# Patient Record
Sex: MALE | Race: WHITE | NOT HISPANIC OR LATINO | Employment: FULL TIME | URBAN - METROPOLITAN AREA
[De-identification: names, ages, dates, MRNs, and addresses within clinical notes are randomized per-mention and may not be internally consistent; named-entity substitution may affect disease eponyms.]

---

## 2014-02-05 LAB — CRC RECOMMENDATION EXT: NORMAL

## 2019-02-18 ENCOUNTER — HISTORICAL (OUTPATIENT)
Dept: ADMINISTRATIVE | Facility: HOSPITAL | Age: 64
End: 2019-02-18

## 2019-02-18 LAB
ABS NEUT (OLG): 3.1 X10(3)/MCL (ref 2.1–9.2)
ALBUMIN SERPL-MCNC: 4.5 GM/DL (ref 3.4–5)
ALBUMIN/GLOB SERPL: 1.88 {RATIO} (ref 1.5–2.5)
ALP SERPL-CCNC: 63 UNIT/L (ref 38–126)
ALT SERPL-CCNC: 29 UNIT/L (ref 7–52)
APPEARANCE, UA: CLEAR
AST SERPL-CCNC: 23 UNIT/L (ref 15–37)
BACTERIA #/AREA URNS AUTO: NORMAL /HPF
BILIRUB SERPL-MCNC: 0.5 MG/DL (ref 0.2–1)
BILIRUB UR QL STRIP: NEGATIVE MG/DL
BILIRUBIN DIRECT+TOT PNL SERPL-MCNC: 0.1 MG/DL (ref 0–0.5)
BILIRUBIN DIRECT+TOT PNL SERPL-MCNC: 0.4 MG/DL
BUN SERPL-MCNC: 19 MG/DL (ref 7–18)
CALCIUM SERPL-MCNC: 9.5 MG/DL (ref 8.5–10)
CHLORIDE SERPL-SCNC: 106 MMOL/L (ref 98–107)
CHOLEST SERPL-MCNC: 170 MG/DL (ref 0–200)
CHOLEST/HDLC SERPL: 3 {RATIO}
CO2 SERPL-SCNC: 21 MMOL/L (ref 21–32)
COLOR UR: YELLOW
CREAT SERPL-MCNC: 1.04 MG/DL (ref 0.6–1.3)
DEPRECATED CALCIDIOL+CALCIFEROL SERPL-MC: 33.9 NG/ML (ref 30–80)
ERYTHROCYTE [DISTWIDTH] IN BLOOD BY AUTOMATED COUNT: 12.2 % (ref 11.5–17)
GLOBULIN SER-MCNC: 2.4 GM/DL (ref 1.2–3)
GLUCOSE (UA): NEGATIVE MG/DL
GLUCOSE SERPL-MCNC: 118 MG/DL (ref 74–106)
HCT VFR BLD AUTO: 41.5 % (ref 42–52)
HDLC SERPL-MCNC: 56 MG/DL (ref 35–60)
HGB BLD-MCNC: 13.5 GM/DL (ref 14–18)
HGB UR QL STRIP: NEGATIVE UNIT/L
KETONES UR QL STRIP: NEGATIVE MG/DL
LDLC SERPL CALC-MCNC: 84 MG/DL (ref 0–129)
LEUKOCYTE ESTERASE UR QL STRIP: NEGATIVE UNIT/L
LYMPHOCYTES # BLD AUTO: 0.8 X10(3)/MCL (ref 0.6–3.4)
LYMPHOCYTES NFR BLD AUTO: 17.9 % (ref 13–40)
MCH RBC QN AUTO: 30.2 PG (ref 27–31.2)
MCHC RBC AUTO-ENTMCNC: 32 GM/DL (ref 32–36)
MCV RBC AUTO: 93 FL (ref 80–94)
MONOCYTES # BLD AUTO: 0.4 X10(3)/MCL (ref 0.1–1.3)
MONOCYTES NFR BLD AUTO: 10.2 % (ref 0.1–24)
NEUTROPHILS NFR BLD AUTO: 71.9 % (ref 47–80)
NITRITE UR QL STRIP.AUTO: NEGATIVE
PH UR STRIP: 6 [PH]
PLATELET # BLD AUTO: 178 X10(3)/MCL (ref 130–400)
PMV BLD AUTO: 9.4 FL (ref 9.4–12.4)
POTASSIUM SERPL-SCNC: 4.2 MMOL/L (ref 3.5–5.1)
PROT SERPL-MCNC: 6.9 GM/DL (ref 6.4–8.2)
PROT UR QL STRIP: NEGATIVE MG/DL
RBC # BLD AUTO: 4.47 X10(6)/MCL (ref 4.7–6.1)
RBC #/AREA URNS HPF: NORMAL /HPF
SODIUM SERPL-SCNC: 140 MMOL/L (ref 136–145)
SP GR UR STRIP: 1.02
SQUAMOUS EPITHELIAL, UA: NORMAL /LPF
T4 FREE SERPL-MCNC: 1.01 NG/DL (ref 0.76–1.46)
TRIGL SERPL-MCNC: 87 MG/DL (ref 30–150)
TSH SERPL-ACNC: 1.02 MIU/ML (ref 0.35–4.94)
UROBILINOGEN UR STRIP-ACNC: 0.2 MG/DL
VLDLC SERPL CALC-MCNC: 17.4 MG/DL
WBC # SPEC AUTO: 4.3 X10(3)/MCL (ref 4.5–11.5)
WBC #/AREA URNS AUTO: NORMAL /[HPF]

## 2019-02-19 LAB
EST. AVERAGE GLUCOSE BLD GHB EST-MCNC: 108 MG/DL
HBA1C MFR BLD: 5.4 % (ref 4.4–6.4)

## 2019-07-22 ENCOUNTER — HISTORICAL (OUTPATIENT)
Dept: LAB | Facility: HOSPITAL | Age: 64
End: 2019-07-22

## 2019-07-22 LAB
DEPRECATED CALCIDIOL+CALCIFEROL SERPL-MC: 46.38 NG/ML (ref 30–80)
PSA SERPL-MCNC: 0.02 NG/ML (ref 0–4)

## 2020-02-20 ENCOUNTER — HISTORICAL (OUTPATIENT)
Dept: ADMINISTRATIVE | Facility: HOSPITAL | Age: 65
End: 2020-02-20

## 2020-02-20 LAB
ABS NEUT (OLG): 2.9 X10(3)/MCL (ref 2.1–9.2)
ALBUMIN SERPL-MCNC: 4.7 GM/DL (ref 3.4–5)
ALBUMIN/GLOB SERPL: 2.04 {RATIO} (ref 1.5–2.5)
ALP SERPL-CCNC: 54 UNIT/L (ref 38–126)
ALT SERPL-CCNC: 28 UNIT/L (ref 7–52)
AST SERPL-CCNC: 23 UNIT/L (ref 15–37)
BILIRUB SERPL-MCNC: 0.5 MG/DL (ref 0.2–1)
BILIRUBIN DIRECT+TOT PNL SERPL-MCNC: 0.1 MG/DL (ref 0–0.5)
BILIRUBIN DIRECT+TOT PNL SERPL-MCNC: 0.4 MG/DL
BUN SERPL-MCNC: 23 MG/DL (ref 7–18)
CALCIUM SERPL-MCNC: 9.7 MG/DL (ref 8.5–10)
CHLORIDE SERPL-SCNC: 106 MMOL/L (ref 98–107)
CHOLEST SERPL-MCNC: 165 MG/DL (ref 0–200)
CHOLEST/HDLC SERPL: 2.8 {RATIO}
CO2 SERPL-SCNC: 26 MMOL/L (ref 21–32)
CREAT SERPL-MCNC: 1.19 MG/DL (ref 0.6–1.3)
DEPRECATED CALCIDIOL+CALCIFEROL SERPL-MC: 27.2 NG/ML (ref 30–80)
ERYTHROCYTE [DISTWIDTH] IN BLOOD BY AUTOMATED COUNT: 11.8 % (ref 11.5–17)
GLOBULIN SER-MCNC: 2.3 GM/DL (ref 1.2–3)
GLUCOSE SERPL-MCNC: 110 MG/DL (ref 74–106)
HCT VFR BLD AUTO: 37.7 % (ref 42–52)
HDLC SERPL-MCNC: 58 MG/DL (ref 35–60)
HGB BLD-MCNC: 12.2 GM/DL (ref 14–18)
LDLC SERPL CALC-MCNC: 94 MG/DL (ref 0–129)
LYMPHOCYTES # BLD AUTO: 1 X10(3)/MCL (ref 0.6–3.4)
LYMPHOCYTES NFR BLD AUTO: 24 % (ref 13–40)
MCH RBC QN AUTO: 28.8 PG (ref 27–31.2)
MCHC RBC AUTO-ENTMCNC: 32 GM/DL (ref 32–36)
MCV RBC AUTO: 89 FL (ref 80–94)
MONOCYTES # BLD AUTO: 0.4 X10(3)/MCL (ref 0.1–1.3)
MONOCYTES NFR BLD AUTO: 9.1 % (ref 0.1–24)
NEUTROPHILS NFR BLD AUTO: 66.9 % (ref 47–80)
PLATELET # BLD AUTO: 187 X10(3)/MCL (ref 130–400)
PMV BLD AUTO: 9.5 FL (ref 9.4–12.4)
POTASSIUM SERPL-SCNC: 4.8 MMOL/L (ref 3.5–5.1)
PROT SERPL-MCNC: 7 GM/DL (ref 6.4–8.2)
RBC # BLD AUTO: 4.23 X10(6)/MCL (ref 4.7–6.1)
SODIUM SERPL-SCNC: 138 MMOL/L (ref 136–145)
TRIGL SERPL-MCNC: 95 MG/DL (ref 30–150)
VLDLC SERPL CALC-MCNC: 19 MG/DL
WBC # SPEC AUTO: 4.3 X10(3)/MCL (ref 4.5–11.5)

## 2020-02-24 ENCOUNTER — HISTORICAL (OUTPATIENT)
Dept: LAB | Facility: HOSPITAL | Age: 65
End: 2020-02-24

## 2020-02-24 LAB
EST. AVERAGE GLUCOSE BLD GHB EST-MCNC: 111 MG/DL
FERRITIN SERPL-MCNC: 260.7 NG/ML (ref 8–388)
HBA1C MFR BLD: 5.5 % (ref 4.4–6.4)
IRON SATN MFR SERPL: 22.1 % (ref 20–50)
IRON SERPL-MCNC: 95 MCG/DL (ref 50–175)
RET# (OHS): 0.06 X10^6/ML (ref 0.03–0.1)
RETICULOCYTE COUNT AUTOMATED (OLG): 1.4 % (ref 1.1–2.1)
TIBC SERPL-MCNC: 429 MCG/DL (ref 250–450)
TRANSFERRIN SERPL-MCNC: 341 MG/DL (ref 200–360)
VIT B12 SERPL-MCNC: 678 PG/ML (ref 193–986)

## 2021-02-26 ENCOUNTER — HISTORICAL (OUTPATIENT)
Dept: ADMINISTRATIVE | Facility: HOSPITAL | Age: 66
End: 2021-02-26

## 2021-02-26 LAB
ABS NEUT (OLG): 2.4 X10(3)/MCL (ref 2.1–9.2)
ALBUMIN SERPL-MCNC: 4.5 GM/DL (ref 3.4–5)
ALBUMIN/GLOB SERPL: 1.88 {RATIO} (ref 1.5–2.5)
ALP SERPL-CCNC: 40 UNIT/L (ref 38–126)
ALT SERPL-CCNC: 26 UNIT/L (ref 7–52)
AST SERPL-CCNC: 24 UNIT/L (ref 15–37)
BILIRUB SERPL-MCNC: 0.5 MG/DL (ref 0.2–1)
BILIRUBIN DIRECT+TOT PNL SERPL-MCNC: 0.1 MG/DL (ref 0–0.5)
BILIRUBIN DIRECT+TOT PNL SERPL-MCNC: 0.4 MG/DL
BUN SERPL-MCNC: 20 MG/DL (ref 7–18)
CALCIUM SERPL-MCNC: 9.6 MG/DL (ref 8.5–10.1)
CHLORIDE SERPL-SCNC: 108 MMOL/L (ref 98–107)
CHOLEST SERPL-MCNC: 166 MG/DL (ref 0–200)
CHOLEST/HDLC SERPL: 3.1 {RATIO}
CO2 SERPL-SCNC: 26 MMOL/L (ref 21–32)
CREAT SERPL-MCNC: 1.15 MG/DL (ref 0.6–1.3)
DEPRECATED CALCIDIOL+CALCIFEROL SERPL-MC: 23.7 NG/ML (ref 30–80)
ERYTHROCYTE [DISTWIDTH] IN BLOOD BY AUTOMATED COUNT: 12.3 % (ref 11.5–17)
EST CREAT CLEARANCE SER (OHS): 83.93 ML/MIN
GLOBULIN SER-MCNC: 2.4 GM/DL (ref 1.2–3)
GLUCOSE SERPL-MCNC: 112 MG/DL (ref 74–106)
HCT VFR BLD AUTO: 35.5 % (ref 42–52)
HDLC SERPL-MCNC: 54 MG/DL (ref 35–60)
HGB BLD-MCNC: 11.5 GM/DL (ref 14–18)
LDLC SERPL CALC-MCNC: 91 MG/DL (ref 0–129)
LYMPHOCYTES # BLD AUTO: 0.7 X10(3)/MCL (ref 0.6–3.4)
LYMPHOCYTES NFR BLD AUTO: 20.5 % (ref 13–40)
MCH RBC QN AUTO: 29.3 PG (ref 27–31.2)
MCHC RBC AUTO-ENTMCNC: 32 GM/DL (ref 32–36)
MCV RBC AUTO: 90 FL (ref 80–94)
MONOCYTES # BLD AUTO: 0.5 X10(3)/MCL (ref 0.1–1.3)
MONOCYTES NFR BLD AUTO: 13.6 % (ref 0.1–24)
NEUTROPHILS NFR BLD AUTO: 65.9 % (ref 47–80)
PLATELET # BLD AUTO: 175 X10(3)/MCL (ref 130–400)
PMV BLD AUTO: 9 FL (ref 9.4–12.4)
POTASSIUM SERPL-SCNC: 4.7 MMOL/L (ref 3.5–5.1)
PROT SERPL-MCNC: 6.9 GM/DL (ref 6.4–8.2)
RBC # BLD AUTO: 3.93 X10(6)/MCL (ref 4.7–6.1)
SODIUM SERPL-SCNC: 141 MMOL/L (ref 136–145)
TRIGL SERPL-MCNC: 130 MG/DL (ref 30–150)
VLDLC SERPL CALC-MCNC: 26 MG/DL
WBC # SPEC AUTO: 3.6 X10(3)/MCL (ref 4.5–11.5)

## 2021-03-17 ENCOUNTER — HISTORICAL (OUTPATIENT)
Dept: ADMINISTRATIVE | Facility: HOSPITAL | Age: 66
End: 2021-03-17

## 2021-03-17 LAB
ABS NEUT (OLG): 3.38 X10(3)/MCL (ref 2.1–9.2)
BASOPHILS # BLD AUTO: 0 X10(3)/MCL (ref 0–0.2)
BASOPHILS NFR BLD AUTO: 0.7 %
EOSINOPHIL # BLD AUTO: 0 X10(3)/MCL (ref 0–0.9)
EOSINOPHIL NFR BLD AUTO: 1.1 %
ERYTHROCYTE [DISTWIDTH] IN BLOOD BY AUTOMATED COUNT: 12.5 % (ref 11.5–17)
FERRITIN SERPL-MCNC: 200.6 NG/ML (ref 21.81–274.66)
FOLATE SERPL-MCNC: 10.5 NG/ML (ref 7–31.4)
GROUP & RH: NORMAL
HAPTOGLOB SERPL-MCNC: 124 MG/DL (ref 40–368)
HCT VFR BLD AUTO: 34.9 % (ref 42–52)
HGB BLD-MCNC: 11.4 GM/DL (ref 14–18)
IRON SATN MFR SERPL: 25 % (ref 20–50)
IRON SERPL-MCNC: 83 UG/DL (ref 65–175)
LDH SERPL-CCNC: 212 UNIT/L (ref 140–271)
LYMPHOCYTES # BLD AUTO: 0.8 X10(3)/MCL (ref 0.6–4.6)
LYMPHOCYTES NFR BLD AUTO: 17.7 %
MCH RBC QN AUTO: 30.1 PG (ref 27–31)
MCHC RBC AUTO-ENTMCNC: 32.7 GM/DL (ref 33–36)
MCV RBC AUTO: 92.1 FL (ref 80–94)
MONOCYTES # BLD AUTO: 0.3 X10(3)/MCL (ref 0.1–1.3)
MONOCYTES NFR BLD AUTO: 6.6 %
NEUTROPHILS # BLD AUTO: 3.4 X10(3)/MCL (ref 2.1–9.2)
NEUTROPHILS NFR BLD AUTO: 73.7 %
PLATELET # BLD AUTO: 172 X10(3)/MCL (ref 130–400)
PMV BLD AUTO: 9.7 FL (ref 9.4–12.4)
RBC # BLD AUTO: 3.79 X10(6)/MCL (ref 4.7–6.1)
RET# (OHS): 0.07 X10^6/ML (ref 0.03–0.1)
RETICULOCYTE COUNT AUTOMATED (OLG): 1.9 % (ref 1.1–2.1)
TIBC SERPL-MCNC: 252 UG/DL (ref 69–240)
TIBC SERPL-MCNC: 335 UG/DL (ref 250–450)
TRANSFERRIN SERPL-MCNC: 298 MG/DL (ref 163–344)
TSH SERPL-ACNC: 0.82 UIU/ML (ref 0.35–4.94)
WBC # SPEC AUTO: 4.6 X10(3)/MCL (ref 4.5–11.5)

## 2021-05-28 ENCOUNTER — HISTORICAL (OUTPATIENT)
Dept: HEMATOLOGY/ONCOLOGY | Facility: CLINIC | Age: 66
End: 2021-05-28

## 2021-05-28 LAB
ABS NEUT (OLG): 3.32 X10(3)/MCL (ref 2.1–9.2)
ALBUMIN SERPL-MCNC: 4.3 GM/DL (ref 3.4–4.8)
ALBUMIN/GLOB SERPL: 1.7 RATIO (ref 1.1–2)
ALP SERPL-CCNC: 44 UNIT/L (ref 40–150)
ALT SERPL-CCNC: 26 UNIT/L (ref 0–55)
ANTINUCLEAR ANTIBODY SCREEN (OHS): NEGATIVE
AST SERPL-CCNC: 25 UNIT/L (ref 5–34)
BASOPHILS # BLD AUTO: 0 X10(3)/MCL (ref 0–0.2)
BASOPHILS NFR BLD AUTO: 0.7 %
BILIRUB SERPL-MCNC: 0.5 MG/DL
BILIRUBIN DIRECT+TOT PNL SERPL-MCNC: 0.2 MG/DL (ref 0–0.5)
BILIRUBIN DIRECT+TOT PNL SERPL-MCNC: 0.3 MG/DL (ref 0–0.8)
BUN SERPL-MCNC: 23.5 MG/DL (ref 8.4–25.7)
CALCIUM SERPL-MCNC: 9.7 MG/DL (ref 8.8–10)
CHLORIDE SERPL-SCNC: 106 MMOL/L (ref 98–107)
CO2 SERPL-SCNC: 24 MMOL/L (ref 23–31)
CREAT SERPL-MCNC: 1.33 MG/DL (ref 0.73–1.18)
DSDNA ANTIBODY (OHS): NEGATIVE
EOSINOPHIL # BLD AUTO: 0.1 X10(3)/MCL (ref 0–0.9)
EOSINOPHIL NFR BLD AUTO: 2.2 %
ERYTHROCYTE [DISTWIDTH] IN BLOOD BY AUTOMATED COUNT: 12.4 % (ref 11.5–17)
GLOBULIN SER-MCNC: 2.6 GM/DL (ref 2.4–3.5)
GLUCOSE SERPL-MCNC: 183 MG/DL (ref 82–115)
HCT VFR BLD AUTO: 36.5 % (ref 42–52)
HGB BLD-MCNC: 12 GM/DL (ref 14–18)
LYMPHOCYTES # BLD AUTO: 0.7 X10(3)/MCL (ref 0.6–4.6)
LYMPHOCYTES NFR BLD AUTO: 16.3 %
MCH RBC QN AUTO: 30.3 PG (ref 27–31)
MCHC RBC AUTO-ENTMCNC: 32.9 GM/DL (ref 33–36)
MCV RBC AUTO: 92.2 FL (ref 80–94)
MONOCYTES # BLD AUTO: 0.3 X10(3)/MCL (ref 0.1–1.3)
MONOCYTES NFR BLD AUTO: 6.5 %
NEUTROPHILS # BLD AUTO: 3.3 X10(3)/MCL (ref 2.1–9.2)
NEUTROPHILS NFR BLD AUTO: 73.9 %
PLATELET # BLD AUTO: 158 X10(3)/MCL (ref 130–400)
PMV BLD AUTO: 9.7 FL (ref 9.4–12.4)
POTASSIUM SERPL-SCNC: 4.3 MMOL/L (ref 3.5–5.1)
PROT SERPL-MCNC: 6.9 GM/DL (ref 5.8–7.6)
RBC # BLD AUTO: 3.96 X10(6)/MCL (ref 4.7–6.1)
RHEUMATOID FACT SERPL-ACNC: <13 IU/ML
SODIUM SERPL-SCNC: 141 MMOL/L (ref 136–145)
WBC # SPEC AUTO: 4.5 X10(3)/MCL (ref 4.5–11.5)

## 2022-01-07 ENCOUNTER — HISTORICAL (OUTPATIENT)
Dept: HEMATOLOGY/ONCOLOGY | Facility: CLINIC | Age: 67
End: 2022-01-07

## 2022-01-07 LAB
ABS NEUT (OLG): 3.5 X10(3)/MCL (ref 2.1–9.2)
ALBUMIN SERPL-MCNC: 4.1 GM/DL (ref 3.4–4.8)
ALBUMIN/GLOB SERPL: 1.5 RATIO (ref 1.1–2)
ALP SERPL-CCNC: 45 UNIT/L (ref 40–150)
ALT SERPL-CCNC: 26 UNIT/L (ref 0–55)
AST SERPL-CCNC: 26 UNIT/L (ref 5–34)
BASOPHILS # BLD AUTO: 0 X10(3)/MCL (ref 0–0.2)
BASOPHILS NFR BLD AUTO: 0.6 %
BILIRUB SERPL-MCNC: 0.4 MG/DL
BILIRUBIN DIRECT+TOT PNL SERPL-MCNC: 0.2 MG/DL (ref 0–0.5)
BILIRUBIN DIRECT+TOT PNL SERPL-MCNC: 0.2 MG/DL (ref 0–0.8)
BUN SERPL-MCNC: 19.7 MG/DL (ref 8.4–25.7)
CALCIUM SERPL-MCNC: 9.8 MG/DL (ref 8.7–10.5)
CHLORIDE SERPL-SCNC: 109 MMOL/L (ref 98–107)
CO2 SERPL-SCNC: 25 MMOL/L (ref 23–31)
CREAT SERPL-MCNC: 1.5 MG/DL (ref 0.73–1.18)
EOSINOPHIL # BLD AUTO: 0.1 X10(3)/MCL (ref 0–0.9)
EOSINOPHIL NFR BLD AUTO: 1.7 %
ERYTHROCYTE [DISTWIDTH] IN BLOOD BY AUTOMATED COUNT: 12.3 % (ref 11.5–17)
GLOBULIN SER-MCNC: 2.8 GM/DL (ref 2.4–3.5)
GLUCOSE SERPL-MCNC: 119 MG/DL (ref 82–115)
HCT VFR BLD AUTO: 34.1 % (ref 42–52)
HGB BLD-MCNC: 11.2 GM/DL (ref 14–18)
LYMPHOCYTES # BLD AUTO: 0.8 X10(3)/MCL (ref 0.6–4.6)
LYMPHOCYTES NFR BLD AUTO: 17.4 %
MCH RBC QN AUTO: 29.9 PG (ref 27–31)
MCHC RBC AUTO-ENTMCNC: 32.8 GM/DL (ref 33–36)
MCV RBC AUTO: 91.2 FL (ref 80–94)
MONOCYTES # BLD AUTO: 0.4 X10(3)/MCL (ref 0.1–1.3)
MONOCYTES NFR BLD AUTO: 7.5 %
NEUTROPHILS # BLD AUTO: 3.5 X10(3)/MCL (ref 2.1–9.2)
NEUTROPHILS NFR BLD AUTO: 72.6 %
PLATELET # BLD AUTO: 194 X10(3)/MCL (ref 130–400)
PMV BLD AUTO: 10.1 FL (ref 9.4–12.4)
POTASSIUM SERPL-SCNC: 4.1 MMOL/L (ref 3.5–5.1)
PROT SERPL-MCNC: 6.9 GM/DL (ref 5.8–7.6)
RBC # BLD AUTO: 3.74 X10(6)/MCL (ref 4.7–6.1)
SODIUM SERPL-SCNC: 143 MMOL/L (ref 136–145)
WBC # SPEC AUTO: 4.8 X10(3)/MCL (ref 4.5–11.5)

## 2022-04-09 ENCOUNTER — HISTORICAL (OUTPATIENT)
Dept: ADMINISTRATIVE | Facility: HOSPITAL | Age: 67
End: 2022-04-09

## 2022-04-27 VITALS
SYSTOLIC BLOOD PRESSURE: 120 MMHG | BODY MASS INDEX: 32.94 KG/M2 | DIASTOLIC BLOOD PRESSURE: 50 MMHG | OXYGEN SATURATION: 98 % | HEIGHT: 67 IN | WEIGHT: 209.88 LBS

## 2022-04-30 NOTE — PROGRESS NOTES
Patient:   Juan Mayo             MRN: [id number]            FIN: [id number]               Age:   65 years     Sex:  Male     :  1955   Associated Diagnoses:   Anemia   Author:   Rhianna Zamora MD      Referring physician: Dr. Dalton Ang  Reason for Referral: Anemia    1. Anemia--Since     Work-up:  2020--retic 1.4.  2021--iron saturation 22%, ferritin 260, vitamin B12 678.    H/H:  2019--13.5/41.5  2020--12.2/37.7  2021--11.5/35.5    2. H/o Prostate Cancer Stage IIA (P2mD2V2)--Diagnosed   Biopsy/pathology:  Prostate biopsy 2018--10/12 cores positive for adenocarcinoma, [place] 7.  Imaging:  NM Bone Scan 18 at [facility]--small focus of increased activity overlies posterior right 10th rib, more intense focus of asymmetric activity right of midline T1 segment laterally, most typical degenerative origin, no findings elsewhere.  Prostate MRI w/ and w/o contrast [facility] 18--biopsy-proven high volume disease largely obscured by extensive biopsy-related hemorrhage, highest volume of disease right apex, no gross extracapsular disease, no seminal vesicle invasion or neurovascular bundle involvement, no skeletal or ranjeet metastases.    PSA:  2021--0.09, testosterone 199 (normal 343-1275).    Treatment history:  1. Prostate radiation 18--18.  2. Lupron/ADT X 1 year--completed in 2019.      Visit Information   Visit type:  New patient evaluation.    Accompanied by:  Spouse.       Chief Complaint      Interval History   Current complaint.   64 yo wm referred to me for anemia. Patient has a history of prostate cancer diagnosed in 2018, treated with radiation and 1 year of ADT. He is followed by Dr. Worthy and last PSA was good but his testosterone level is low. Patient reports having fatigue. Denies any bleeding. Work-up done by PCP showed iron levels normal and vitamin B12 level normal. Patient is up to date on colonoscopy, last done by Dr. Mark in . Patient denies any  "other problems. He reports that he has been unable to donate blood due to "low iron" but I explained this was actually due to anemia and that anemia can have other causes. He is taking one iron pill daily.      Review of Systems   Constitutional:  Fatigue, No fever, No chills, No weakness.    Eye:  No recent visual problem.    Ear/Nose/Mouth/Throat:  No decreased hearing, No nasal congestion, No sore throat.    Respiratory:  No shortness of breath, No cough, No wheezing.    Cardiovascular:  No chest pain, No palpitations, No peripheral edema.    Gastrointestinal:  No nausea, No vomiting, No diarrhea, No constipation, No abdominal pain.    Hematology/Lymphatics:  No bruising tendency, No bleeding tendency.    Musculoskeletal:  No back pain, No joint pain.    Integumentary:  No rash, No skin lesion.    Neurologic:  No confusion, No headache.    Psychiatric:  No anxiety, No depression.    All other systems are negative      Health Status   Allergies:    Allergies (1) Active Reaction  No Known Medication Allergies None Documented     Current medications:  (Selected)   Prescriptions  Prescribed  amLODIPine 10 mg oral tablet: 10 mg = 1 tab(s), Oral, Daily, # 30 tab(s), 11 Refill(s), Pharmacy: [facility], 170, cm, Height/Length Dosing, 02/26/21 7:43:00 CST, 95.2, kg, Weight Dosing, 02/26/21 7:43:00 CST  atorvastatin 20 mg oral tablet: See Instructions, TAKE ONE TABLET BY MOUTH IN THE EVENING., # 30 tab(s), 11 Refill(s), Pharmacy: [facility], 170, cm, Height/Length Dosing, 02/26/21 7:43:00 CST, 95.2, kg, Weight Dosing, 02/26/21 7:43:00 CST  fenofibrate 160 mg oral tablet: 160 mg = 1 tab(s), Oral, Daily, # 30 tab(s), 11 Refill(s), Pharmacy: [facility], 170, cm, Height/Length Dosing, 02/26/21 7:43:00 CST, 95.2, kg, Weight Dosing, 02/26/21 7:43:00 CST  lisinopril 40 mg oral tablet: See Instructions, TAKE 1 TABLET BY MOUTH DAILY, # 30 tab(s), 11 Refill(s), Pharmacy: [facility] " sandee Alfaro, 170, cm, Height/Length Dosing, 21 7:43:00 CST, 95.2, kg, Weight Dosing, 21 7:43:00 CST  Documented Medications  Documented  Vitamin B12: Oral, Daily, 0 Refill(s)   Problem list:    Active Problems (8)  Bilateral carotid artery stenosis   Fatigue   History of prostate cancer   Hypercholesteremia   Hypertension   Hypertriglyceridemia   Obesity   Vitamin D deficiency         Histories   Past Medical History:    No active or resolved past medical history items have been selected or recorded.   Family History:    Hypertension  Mother ()     Procedure history:    Colonoscopy ([id number]) on 2014 at 58 Years.  Colonoscopy ([id number]) on 2004 at 48 Years.  [place] teeth extracted ([id number]).   Social History        Social & Psychosocial Habits    Alcohol  2019  Use: Current    Frequency: Daily    Employment/School  2019  Status: Employed    Description: MECHANICAL WORK FOR THE [employer]    Exercise    Comment: NONE - 2019 07:15 - Marlyn Romero LPN    Home/Environment  2019  Lives with: Children, Spouse    Living situation: Home/Independent    Nutrition/Health  2019  Type of diet: Regular    Caffeine intake amount: 1 CUP OF COFFEE PER DAY    Substance Use  2019  Use: Never    Tobacco  2019  Use: Never (less than 100 in l    Patient Wants Consult For Cessation Counseling N/A    2020  Use: Never (less than 100 in l    Patient Wants Consult For Cessation Counseling N/A    Abuse/Neglect  2020  SHX Any signs of abuse or neglect No  .        Physical Examination   Vital Signs   3/17/2021 13:18 CDT      Temperature Oral          37.0 DegC                             Temperature Oral (calculated)             98.60 DegF                             Peripheral Pulse Rate     75 bpm                             Respiratory Rate          20 br/min                             SpO2                      98 %                             Oxygen  Therapy            Room air                             Systolic Blood Pressure   145 mmHg  HI                             Diastolic Blood Pressure  70 mmHg                             Blood Pressure Location   Right arm                             Manual Cuff BP            No     General:  Alert and oriented, No acute distress, pleasant white male.    Eye:  Vision unchanged.    HENT:  Normocephalic, Normal hearing, Oral mucosa is moist.    Neck:  Supple, No jugular venous distention, No lymphadenopathy, No thyromegaly.    Respiratory:  Lungs are clear to auscultation, Breath sounds are equal.    Cardiovascular:  Normal rate, Regular rhythm, No murmur, No gallop, Normal peripheral perfusion, No edema.    Gastrointestinal:  Soft, Non-tender, Non-distended, Normal bowel sounds, No organomegaly.    Lymphatics:  No lymphadenopathy neck, axilla, groin.    Musculoskeletal:  Normal range of motion, Normal strength, No deformity, Normal gait.    Integumentary:  Warm, Intact.    Neurologic:  Alert, Oriented, Normal sensory, Normal motor function.    Psychiatric:  Cooperative, Appropriate mood & affect.       Review / Management   Laboratory Results   Today's Lab Results : PowerNote Discrete Results   3/17/2021 13:44 CDT      WBC                       4.6 x10(3)/mcL                             RBC                       3.79 x10(6)/mcL  LOW                             Hgb                       11.4 gm/dL  LOW                             Hct                       34.9 %  LOW                             Platelet                  172 x10(3)/mcL                             MCV                       92.1 fL                             MCH                       30.1 pg                             MCHC                      32.7 gm/dL  LOW                             RDW                       12.5 %                             MPV                       9.7 fL                             Abs Neut                  3.38 x10(3)/mcL                              NEUT%                     73.7 %  NA                             NEUT#                     3.4 x10(3)/mcL                             LYMPH%                    17.7 %  NA                             LYMPH#                    0.8 x10(3)/mcL                             MONO%                     6.6 %  NA                             MONO#                     0.3 x10(3)/mcL                             EOS%                      1.1 %  NA                             EOS#                      0.0 x10(3)/mcL                             BASO%                     0.7 %  NA                             BASO#                     0.0 x10(3)/mcL           Impression and Plan   Diagnosis     Anemia ([id number] D64.9).     Plan   Patient with mild anemia, remainder of CBCdiff normal.  Iron studies and vitamin B12 levels normal.  Will send remainder of work-up to r/o other cause.  Patient with a h/o prostate cancer diagnosed in 2018, treated with radiation and ADT X 1 year.  Last dose of Lupron was 7/2019.  Recent PSA good but testosterone remains low.  Suspect anemia may be lingering effect from Lupron and low testosterone.    Will have patient RTC next week to follow-up testing.  If no other etiology is found, plan for observation with follow-up and repeat CBC diff in 3 months.  Consider bone marrow biopsy in the future if anemia becomes progressively worse and no other etiology apparent.    All questions answered at this time.    Rhianna Zamora MD

## 2022-07-05 DIAGNOSIS — N18.9 ANEMIA DUE TO CHRONIC KIDNEY DISEASE, UNSPECIFIED CKD STAGE: Primary | ICD-10-CM

## 2022-07-05 DIAGNOSIS — D63.1 ANEMIA DUE TO CHRONIC KIDNEY DISEASE, UNSPECIFIED CKD STAGE: Primary | ICD-10-CM

## 2022-07-05 PROBLEM — I65.29 STENOSIS OF CAROTID ARTERY: Status: ACTIVE | Noted: 2022-07-05

## 2022-07-05 PROBLEM — D64.9 ANEMIA: Status: ACTIVE | Noted: 2022-07-05

## 2022-07-05 PROBLEM — E66.9 OBESITY: Status: ACTIVE | Noted: 2022-07-05

## 2022-07-05 PROBLEM — I10 HYPERTENSION: Status: ACTIVE | Noted: 2022-07-05

## 2022-07-05 PROBLEM — M25.561 CHRONIC PAIN OF RIGHT KNEE: Status: ACTIVE | Noted: 2022-07-05

## 2022-07-05 PROBLEM — E55.9 VITAMIN D DEFICIENCY: Status: ACTIVE | Noted: 2022-07-05

## 2022-07-05 PROBLEM — E78.1 HYPERTRIGLYCERIDEMIA: Status: ACTIVE | Noted: 2022-07-05

## 2022-07-05 PROBLEM — G89.29 CHRONIC PAIN OF RIGHT KNEE: Status: ACTIVE | Noted: 2022-07-05

## 2022-07-05 PROBLEM — R53.83 FATIGUE: Status: ACTIVE | Noted: 2022-07-05

## 2022-07-05 PROBLEM — Z85.46 HISTORY OF PROSTATE CANCER: Status: ACTIVE | Noted: 2022-07-05

## 2022-07-05 PROCEDURE — 82728 ASSAY OF FERRITIN: CPT | Performed by: FAMILY MEDICINE

## 2022-07-05 PROCEDURE — 83970 ASSAY OF PARATHORMONE: CPT | Performed by: FAMILY MEDICINE

## 2022-07-05 PROCEDURE — 83735 ASSAY OF MAGNESIUM: CPT | Performed by: FAMILY MEDICINE

## 2022-07-05 PROCEDURE — 82746 ASSAY OF FOLIC ACID SERUM: CPT | Performed by: FAMILY MEDICINE

## 2022-07-05 PROCEDURE — 83540 ASSAY OF IRON: CPT | Performed by: FAMILY MEDICINE

## 2022-07-05 PROCEDURE — 82607 VITAMIN B-12: CPT | Performed by: FAMILY MEDICINE

## 2022-07-05 PROCEDURE — 84100 ASSAY OF PHOSPHORUS: CPT | Performed by: FAMILY MEDICINE

## 2022-07-05 NOTE — PROGRESS NOTES
Subjective:       Patient ID: Juan Mayo is a 67 y.o. male.    PCP: Dr. Dalton Ang  Urology: Dr. Luis Worthy    1. Anemia--Since 2019    Work-up:  2/2020--retic 1.4.  2/2021--iron saturation 22%, ferritin 260, vitamin B12 678.  3/2021--LDH normal, Haptoglobin normal, retic 1.9 (low-normal), folic acid normal, iron saturation 25%, ferritin 200, Radha negative, TSH normal 0.81, peripheral smear with normocytic anemia w/o anisocytosis.  6/2021--SPEP no M-spike, MAY negative, RF normal.    H/H:  2/2019--13.5/41.5  2/2020--12.2/37.7  2/2021--11.5/35.5  3/2021--11.4/34.9  5/2021--12.0/36.5  1/2022--11.2/34.1  6/2022--10.6/32.7    2. H/o Prostate Cancer Stage IIA ([id number])--Diagnosed 2018  Biopsy/pathology:  Prostate biopsy 5/2018--10/12 cores positive for adenocarcinoma, [place] 7.  Imaging:  NM Bone Scan 6/6/18 at [facility]--small focus of increased activity overlies posterior right 10th rib, more intense focus of asymmetric activity right of midline T1 segment laterally, most typical degenerative origin, no findings elsewhere.  Prostate MRI w/ and w/o contrast [facility] 6/6/18--biopsy-proven high volume disease largely obscured by extensive biopsy-related hemorrhage, highest volume of disease right apex, no gross extracapsular disease, no seminal vesicle invasion or neurovascular bundle involvement, no skeletal or ranjeet metastases.    PSA:  1/2021--0.09, testosterone 199 (normal 343-1275).    Treatment history:  1. Prostate radiation 9/25/18--11/27/18.  2. Lupron/ADT X 1 year--completed in 7/2019.     Chief Complaint: Results (No complaints)    HPI   Patient present for telemedicine follow-up of anemia. He is doing fairly well. He works at an oil-change center outside and states he has been more fatigued but attributed it to the heat. Recent labs show worsening renal function. His PCP already contacted him and encouraged him to stay hydrated and he repeats the labs again in 2 weeks. His anemia was also decreased with Hgb of  10.6 g/dL. Iron studies were good. His folic acid was low and we dicussed starting daily Folic acid. He has no new complaints. Continues follow-up with Dr. Worthy for h/o prostate cancer.     Past Medical History:   Diagnosis Date    Anemia, unspecified     Bilateral carotid artery stenosis     Essential (primary) hypertension     Fatigue     Hyperglycemia     Hypertriglyceridemia     Obesity, unspecified     Renal insufficiency     Vitamin D deficiency       Review of patient's allergies indicates:  No Known Allergies   Current Outpatient Medications on File Prior to Visit   Medication Sig Dispense Refill    amLODIPine (NORVASC) 10 MG tablet       aspirin 81 mg Cap Take 81 mg by mouth.      atorvastatin (LIPITOR) 20 MG tablet Take 20 mg by mouth every evening.      fenofibrate 160 MG Tab Take 160 mg by mouth once daily.      lisinopriL (PRINIVIL,ZESTRIL) 40 MG tablet        No current facility-administered medications on file prior to visit.      Review of Systems   Constitutional: Positive for fatigue. Negative for activity change, fever and unexpected weight change.   Eyes: Negative for visual disturbance.   Respiratory: Negative for cough and shortness of breath.    Cardiovascular: Negative for chest pain.   Gastrointestinal: Negative for abdominal pain, blood in stool, constipation, diarrhea, nausea and vomiting.   Genitourinary: Negative for difficulty urinating.   Musculoskeletal: Negative for back pain.   Integumentary:  Negative for rash.   Neurological: Negative for dizziness, weakness and headaches.   Psychiatric/Behavioral: Negative for behavioral problems and suicidal ideas.          Physical Exam  Vitals reviewed.   Constitutional:       Appearance: Normal appearance.   HENT:      Head: Normocephalic.   Eyes:      Extraocular Movements: Extraocular movements intact.   Pulmonary:      Effort: Pulmonary effort is normal.   Musculoskeletal:      Cervical back: Neck supple.   Neurological:       Mental Status: He is alert and oriented to person, place, and time.   Psychiatric:         Mood and Affect: Mood normal.         Thought Content: Thought content normal.         Judgment: Judgment normal.           Office Visit on 07/05/2022   Component Date Value    Vitamin B12 Level 07/05/2022 472     Folate Level 07/05/2022 6.4 (A)    Ferritin Level 07/05/2022 329.69 (A)    Iron Binding Capacity Un* 07/05/2022 279 (A)    Iron Level 07/05/2022 88     Iron Binding Capacity To* 07/05/2022 367     Iron Saturation 07/05/2022 24     Magnesium Level 07/05/2022 2.40     Phosphorus Level 07/05/2022 2.9     Parathyroid Hormone Inta* 07/05/2022 59.4     Urine Creatinine 07/05/2022 100.0     Microalbumin Creatinine * 07/05/2022 10.0     Urine Microalbumin 07/05/2022 10.0    Lab Visit on 06/27/2022   Component Date Value    Sodium Level 06/27/2022 141     Potassium Level 06/27/2022 4.8     Chloride 06/27/2022 108     Carbon Dioxide 06/27/2022 21     Glucose Level 06/27/2022 86     Blood Urea Nitrogen 06/27/2022 49.0 (A)    Creatinine 06/27/2022 2.12 (A)    BUN/Creatinine Ratio 06/27/2022 23     Calcium Level Total 06/27/2022 9.8     Estimated GFR-Non Lauren* 06/27/2022 33     Anion Gap 06/27/2022 12.0     WBC 06/27/2022 5.3     RBC 06/27/2022 3.59 (A)    Hgb 06/27/2022 10.6 (A)    Hct 06/27/2022 32.7 (A)    MCV 06/27/2022 91.1     MCH 06/27/2022 29.5     MCHC 06/27/2022 32.4 (A)    RDW 06/27/2022 12.8     Platelet 06/27/2022 169     MPV 06/27/2022 9.8     Neut % 06/27/2022 71.5     Lymph % 06/27/2022 17.1     Mono % 06/27/2022 11.4     Lymph # 06/27/2022 0.9     Neut # 06/27/2022 3.8     Mono # 06/27/2022 0.6       Assessment:       Problem List Items Addressed This Visit        Renal/    Renal insufficiency       Oncology    Anemia, unspecified - Primary    Relevant Medications    folic acid (FOLVITE) 1 MG tablet    Other Relevant Orders    Iron and TIBC    Ferritin    Folate     Vitamin B12    Comprehensive Metabolic Panel    CBC Auto Differential      Other Visit Diagnoses     Folic acid deficiency        Relevant Medications    folic acid (FOLVITE) 1 MG tablet             Plan:       Patient with mild anemia, remainder of CBCdiff normal.  Iron studies, folic acid, vitamin B12 levels, TSH normal. No evidence of hemolysis. Peripheral smear unremarkable aside from normocytic anemia.  Patient with a h/o prostate cancer diagnosed in 2018, treated with radiation and ADT X 1 year.  Last dose of Lupron was 7/2019.  PSA from 1/2021 good but testosterone remains low.  Suspected initially anemia may be lingering effect from Lupron and low testosterone.  Additional work-up for anemia to r/o other causes negative.    Recent labs show anemia slightly lower. He does have some worsening of his renal insufficiency which is the likely culprit. He remains asymptomatic.  His PCP has already encouraged him to stay hydrated and has repeat labs scheduled for 2 weeks.   Iron level normal with ferritin elevated.   Folate level 6.4. Will start Folic acid 1mg daily. Prescription sent to pharmacy.   Will plan for continued observation only.  RTC in 3 months for follow-up with repeat labs.    Consider bone marrow biopsy in if anemia becomes progressively worse and no other etiology apparent.  All questions answered at this time.    This is a telemedicine note. Patient was treated using telemedicine, realtime audio and video, according to [facility] protocol. I, distant provider, conducted the visit from [facility]. The patient participated in the visit at a non-[facility] location selected by the patient, identified at his place of employment. I am licensed in the state where the patient stated he or she was located. The patient stated that he understood and accepted the privacy and security risks to their information at their location.     Alden Obando, BROCK

## 2022-07-13 ENCOUNTER — OFFICE VISIT (OUTPATIENT)
Dept: HEMATOLOGY/ONCOLOGY | Facility: CLINIC | Age: 67
End: 2022-07-13
Payer: COMMERCIAL

## 2022-07-13 DIAGNOSIS — N18.9 ANEMIA DUE TO CHRONIC KIDNEY DISEASE, UNSPECIFIED CKD STAGE: Primary | ICD-10-CM

## 2022-07-13 DIAGNOSIS — D63.1 ANEMIA DUE TO CHRONIC KIDNEY DISEASE, UNSPECIFIED CKD STAGE: Primary | ICD-10-CM

## 2022-07-13 DIAGNOSIS — E53.8 FOLIC ACID DEFICIENCY: ICD-10-CM

## 2022-07-13 DIAGNOSIS — N28.9 RENAL INSUFFICIENCY: ICD-10-CM

## 2022-07-13 PROCEDURE — 99212 OFFICE O/P EST SF 10 MIN: CPT | Mod: 95,,, | Performed by: NURSE PRACTITIONER

## 2022-07-13 PROCEDURE — 99212 PR OFFICE/OUTPT VISIT, EST, LEVL II, 10-19 MIN: ICD-10-PCS | Mod: 95,,, | Performed by: NURSE PRACTITIONER

## 2022-07-13 RX ORDER — FOLIC ACID 1 MG/1
1 TABLET ORAL DAILY
Qty: 90 TABLET | Refills: 3 | Status: SHIPPED | OUTPATIENT
Start: 2022-07-13 | End: 2023-04-03

## 2022-10-14 ENCOUNTER — DOCUMENTATION ONLY (OUTPATIENT)
Dept: PRIMARY CARE CLINIC | Facility: CLINIC | Age: 67
End: 2022-10-14
Payer: COMMERCIAL

## 2022-10-17 NOTE — PROGRESS NOTES
Subjective:       Patient ID: Juan Mayo is a 67 y.o. male.    PCP: Dr. Dalton Ang  Urology: Dr. Luis Worthy    1. Anemia--Since 2019    Work-up:  2/2020--retic 1.4.  2/2021--iron saturation 22%, ferritin 260, vitamin B12 678.  3/2021--LDH normal, Haptoglobin normal, retic 1.9 (low-normal), folic acid normal, iron saturation 25%, ferritin 200, Radha negative, TSH normal 0.81, peripheral smear with normocytic anemia w/o anisocytosis.  6/2021--SPEP no M-spike, MAY negative, RF normal.    H/H:  2/2019--13.5/41.5  2/2020--12.2/37.7  2/2021--11.5/35.5  3/2021--11.4/34.9  5/2021--12.0/36.5  1/2022--11.2/34.1  6/2022--10.6/32.7  10/2022--11.4/36.7    2. H/o Prostate Cancer Stage IIA ([id number])--Diagnosed 2018  Biopsy/pathology:  Prostate biopsy 5/2018--10/12 cores positive for adenocarcinoma, [place] 7.  Imaging:  NM Bone Scan 6/6/18 at [facility]--small focus of increased activity overlies posterior right 10th rib, more intense focus of asymmetric activity right of midline T1 segment laterally, most typical degenerative origin, no findings elsewhere.  Prostate MRI w/ and w/o contrast [facility] 6/6/18--biopsy-proven high volume disease largely obscured by extensive biopsy-related hemorrhage, highest volume of disease right apex, no gross extracapsular disease, no seminal vesicle invasion or neurovascular bundle involvement, no skeletal or ranjeet metastases.    PSA:  1/2021--0.09, testosterone 199 (normal 343-1275).    Treatment history:  1. Prostate radiation 9/25/18--11/27/18.  2. Lupron/ADT X 1 year--completed in 7/2019.     Chief Complaint: Other Misc (Pt reports no new concerns today.)    HPI   Patient present for telephone follow-up of anemia. He is doing well. He works at an oil-change center outside. His renal function is much better on his recent labs. States his Nephrologist changed some of his BP medications and this seems to have helped. Admits he has been staying hydrated as well. His anemia has improved with Hgb of 11.4  g/dL. Iron level slightly low but his ferritin remains elevated. His folic acid is normal and he continues on the folic acid daily. He has no new complaints. Continues follow-up with Dr. Worthy for h/o prostate cancer.     Past Medical History:   Diagnosis Date    Anemia, unspecified     Bilateral carotid artery stenosis     Essential (primary) hypertension     Fatigue     Hyperglycemia     Hypertriglyceridemia     Obesity, unspecified     Renal insufficiency     Vitamin D deficiency       Review of patient's allergies indicates:  No Known Allergies   Current Outpatient Medications on File Prior to Visit   Medication Sig Dispense Refill    aspirin 81 mg Cap Take 81 mg by mouth.      atorvastatin (LIPITOR) 20 MG tablet Take 20 mg by mouth every evening.      fenofibrate 160 MG Tab Take 160 mg by mouth once daily.      folic acid (FOLVITE) 1 MG tablet Take 1 tablet (1 mg total) by mouth once daily. 90 tablet 3    NIFEdipine (PROCARDIA-XL) 60 MG (OSM) 24 hr tablet Take 60 mg by mouth.      lisinopriL (PRINIVIL,ZESTRIL) 40 MG tablet       [DISCONTINUED] amLODIPine (NORVASC) 10 MG tablet        No current facility-administered medications on file prior to visit.      Review of Systems   Constitutional:  Negative for activity change, fever and unexpected weight change.   Eyes:  Negative for visual disturbance.   Respiratory:  Negative for cough and shortness of breath.    Cardiovascular:  Negative for chest pain.   Gastrointestinal:  Negative for abdominal pain, blood in stool, constipation, diarrhea, nausea and vomiting.   Genitourinary:  Negative for difficulty urinating.   Musculoskeletal:  Negative for back pain.   Integumentary:  Negative for rash.   Neurological:  Negative for dizziness, weakness and headaches.   Psychiatric/Behavioral:  Negative for behavioral problems and suicidal ideas.          Lab Visit on 10/19/2022   Component Date Value    Iron Binding Capacity Un* 10/19/2022 315 (H)     Iron Level 10/19/2022  58 (L)     Transferrin 10/19/2022 339     Iron Binding Capacity To* 10/19/2022 373     Iron Saturation 10/19/2022 16 (L)     Ferritin Level 10/19/2022 308.67 (H)     Folate Level 10/19/2022 13.9     Vitamin B12 Level 10/19/2022 457     Sodium Level 10/19/2022 143     Potassium Level 10/19/2022 4.5     Chloride 10/19/2022 106     Carbon Dioxide 10/19/2022 26     Glucose Level 10/19/2022 82     Blood Urea Nitrogen 10/19/2022 24.9     Creatinine 10/19/2022 1.31 (H)     Calcium Level Total 10/19/2022 10.0     Protein Total 10/19/2022 7.1     Albumin Level 10/19/2022 4.3     Globulin 10/19/2022 2.8     Albumin/Globulin Ratio 10/19/2022 1.5     Bilirubin Total 10/19/2022 0.5     Alkaline Phosphatase 10/19/2022 53     Alanine Aminotransferase 10/19/2022 26     Aspartate Aminotransfera* 10/19/2022 27     eGFR 10/19/2022 59     WBC 10/19/2022 6.4     RBC 10/19/2022 3.88 (L)     Hgb 10/19/2022 11.4 (L)     Hct 10/19/2022 36.7 (L)     MCV 10/19/2022 94.6 (H)     MCH 10/19/2022 29.4     MCHC 10/19/2022 31.1 (L)     RDW 10/19/2022 13.0     Platelet 10/19/2022 215     MPV 10/19/2022 9.2     Neut % 10/19/2022 76.4     Lymph % 10/19/2022 13.6     Mono % 10/19/2022 8.1     Eos % 10/19/2022 0.5     Basophil % 10/19/2022 0.5     Lymph # 10/19/2022 0.87     Neut # 10/19/2022 4.9     Mono # 10/19/2022 0.52     Eos # 10/19/2022 0.03     Baso # 10/19/2022 0.03     IG# 10/19/2022 0.06 (H)     IG% 10/19/2022 0.9    Documentation Only on 10/14/2022   Component Date Value    CRC Recommendation Exter* 02/05/2014 Repeat colonoscopy in 10 years       Assessment:       Problem List Items Addressed This Visit          Oncology    Anemia - Primary    Overview     Formatting of this note might be different from the original.  Last Assessment & Plan:   Formatting of this note might be different from the original.  Followed by Dr. Rhianna Zamora; labs ordered.    Last Assessment & Plan:   Formatting of this note might be different from the  original.  Followed by Dr. Rhianna Zamora               Plan:       Patient with mild anemia, remainder of CBCdiff normal.  Iron studies, folic acid, vitamin B12 levels, TSH normal. No evidence of hemolysis. Peripheral smear unremarkable aside from normocytic anemia.  Patient with a h/o prostate cancer diagnosed in 2018, treated with radiation and ADT X 1 year.  Last dose of Lupron was 7/2019.  PSA from 1/2021 good but testosterone remains low.  Suspected initially anemia may be lingering effect from Lupron and low testosterone.  Additional work-up for anemia to r/o other causes negative.    Recent labs show improved anemia with Hgb of 11.4 g/dL. Iron level mildly decreased at 58 but his ferritin remains elevated at 308.   Folate level normal. Continue Folic acid 1mg daily.   His renal function is also better and is now back to his baseline with creatinine of 1.31. Continues with close follow-up with Nephrology.   Will plan for continued observation only.  RTC in 4 months for follow-up with repeat labs.  Consider bone marrow biopsy in if anemia becomes progressively worse and no other etiology apparent.  All questions answered at this time.    This was a Telephone visit. The patient consented to the consult held via telephone. The phone call took place with myself and the patient only according to [facility] protocol. I, distant Nurse Practitioner, conducted the visit from [facility]. The patient participated in the visit at a non-[facility] location selected by the patient, identified at his/her home. I am licensed in the state where the patient stated he or she was located. The patient stated that he/she understood and accepted the privacy and security risks to their information at their location. Total time spent on the phone was 10 minutes.       SANDI Balderrama

## 2022-10-19 ENCOUNTER — LAB VISIT (OUTPATIENT)
Dept: LAB | Facility: HOSPITAL | Age: 67
End: 2022-10-19
Attending: INTERNAL MEDICINE
Payer: COMMERCIAL

## 2022-10-19 DIAGNOSIS — N18.9 ANEMIA DUE TO CHRONIC KIDNEY DISEASE, UNSPECIFIED CKD STAGE: ICD-10-CM

## 2022-10-19 DIAGNOSIS — D63.1 ANEMIA DUE TO CHRONIC KIDNEY DISEASE, UNSPECIFIED CKD STAGE: ICD-10-CM

## 2022-10-19 LAB
ALBUMIN SERPL-MCNC: 4.3 GM/DL (ref 3.4–4.8)
ALBUMIN/GLOB SERPL: 1.5 RATIO (ref 1.1–2)
ALP SERPL-CCNC: 53 UNIT/L (ref 40–150)
ALT SERPL-CCNC: 26 UNIT/L (ref 0–55)
AST SERPL-CCNC: 27 UNIT/L (ref 5–34)
BASOPHILS # BLD AUTO: 0.03 X10(3)/MCL (ref 0–0.2)
BASOPHILS NFR BLD AUTO: 0.5 %
BILIRUBIN DIRECT+TOT PNL SERPL-MCNC: 0.5 MG/DL
BUN SERPL-MCNC: 24.9 MG/DL (ref 8.4–25.7)
CALCIUM SERPL-MCNC: 10 MG/DL (ref 8.8–10)
CHLORIDE SERPL-SCNC: 106 MMOL/L (ref 98–107)
CO2 SERPL-SCNC: 26 MMOL/L (ref 23–31)
CREAT SERPL-MCNC: 1.31 MG/DL (ref 0.73–1.18)
EOSINOPHIL # BLD AUTO: 0.03 X10(3)/MCL (ref 0–0.9)
EOSINOPHIL NFR BLD AUTO: 0.5 %
ERYTHROCYTE [DISTWIDTH] IN BLOOD BY AUTOMATED COUNT: 13 % (ref 11.5–17)
FERRITIN SERPL-MCNC: 308.67 NG/ML (ref 21.81–274.66)
FOLATE SERPL-MCNC: 13.9 NG/ML (ref 7–31.4)
GFR SERPLBLD CREATININE-BSD FMLA CKD-EPI: 59 MLS/MIN/1.73/M2
GLOBULIN SER-MCNC: 2.8 GM/DL (ref 2.4–3.5)
GLUCOSE SERPL-MCNC: 82 MG/DL (ref 82–115)
HCT VFR BLD AUTO: 36.7 % (ref 42–52)
HGB BLD-MCNC: 11.4 GM/DL (ref 14–18)
IMM GRANULOCYTES # BLD AUTO: 0.06 X10(3)/MCL (ref 0–0.04)
IMM GRANULOCYTES NFR BLD AUTO: 0.9 %
IRON SATN MFR SERPL: 16 % (ref 20–50)
IRON SERPL-MCNC: 58 UG/DL (ref 65–175)
LYMPHOCYTES # BLD AUTO: 0.87 X10(3)/MCL (ref 0.6–4.6)
LYMPHOCYTES NFR BLD AUTO: 13.6 %
MCH RBC QN AUTO: 29.4 PG (ref 27–31)
MCHC RBC AUTO-ENTMCNC: 31.1 MG/DL (ref 33–36)
MCV RBC AUTO: 94.6 FL (ref 80–94)
MONOCYTES # BLD AUTO: 0.52 X10(3)/MCL (ref 0.1–1.3)
MONOCYTES NFR BLD AUTO: 8.1 %
NEUTROPHILS # BLD AUTO: 4.9 X10(3)/MCL (ref 2.1–9.2)
NEUTROPHILS NFR BLD AUTO: 76.4 %
PLATELET # BLD AUTO: 215 X10(3)/MCL (ref 130–400)
PMV BLD AUTO: 9.2 FL (ref 7.4–10.4)
POTASSIUM SERPL-SCNC: 4.5 MMOL/L (ref 3.5–5.1)
PROT SERPL-MCNC: 7.1 GM/DL (ref 5.8–7.6)
RBC # BLD AUTO: 3.88 X10(6)/MCL (ref 4.7–6.1)
SODIUM SERPL-SCNC: 143 MMOL/L (ref 136–145)
TIBC SERPL-MCNC: 315 UG/DL (ref 69–240)
TIBC SERPL-MCNC: 373 UG/DL (ref 250–450)
TRANSFERRIN SERPL-MCNC: 339 MG/DL (ref 163–344)
VIT B12 SERPL-MCNC: 457 PG/ML (ref 213–816)
WBC # SPEC AUTO: 6.4 X10(3)/MCL (ref 4.5–11.5)

## 2022-10-19 PROCEDURE — 36415 COLL VENOUS BLD VENIPUNCTURE: CPT

## 2022-10-19 PROCEDURE — 82746 ASSAY OF FOLIC ACID SERUM: CPT

## 2022-10-19 PROCEDURE — 80053 COMPREHEN METABOLIC PANEL: CPT

## 2022-10-19 PROCEDURE — 83540 ASSAY OF IRON: CPT

## 2022-10-19 PROCEDURE — 82728 ASSAY OF FERRITIN: CPT

## 2022-10-19 PROCEDURE — 82607 VITAMIN B-12: CPT

## 2022-10-19 PROCEDURE — 85025 COMPLETE CBC W/AUTO DIFF WBC: CPT

## 2022-10-24 ENCOUNTER — TELEPHONE (OUTPATIENT)
Dept: HEMATOLOGY/ONCOLOGY | Facility: CLINIC | Age: 67
End: 2022-10-24

## 2022-10-24 ENCOUNTER — OFFICE VISIT (OUTPATIENT)
Dept: HEMATOLOGY/ONCOLOGY | Facility: CLINIC | Age: 67
End: 2022-10-24
Payer: COMMERCIAL

## 2022-10-24 DIAGNOSIS — N18.9 ANEMIA DUE TO CHRONIC KIDNEY DISEASE, UNSPECIFIED CKD STAGE: Primary | ICD-10-CM

## 2022-10-24 DIAGNOSIS — D63.1 ANEMIA DUE TO CHRONIC KIDNEY DISEASE, UNSPECIFIED CKD STAGE: Primary | ICD-10-CM

## 2022-10-24 PROBLEM — N17.9 ACUTE KIDNEY INJURY: Status: ACTIVE | Noted: 2022-09-23

## 2022-10-24 PROBLEM — N18.4 CHRONIC KIDNEY DISEASE, STAGE IV (SEVERE): Status: ACTIVE | Noted: 2022-08-29

## 2022-10-24 PROBLEM — D64.9 ANEMIA: Status: ACTIVE | Noted: 2022-08-29

## 2022-10-24 PROBLEM — I12.9 BENIGN HYPERTENSIVE KIDNEY DISEASE WITH CHRONIC KIDNEY DISEASE: Status: ACTIVE | Noted: 2022-08-29

## 2022-10-24 PROCEDURE — 1160F PR REVIEW ALL MEDS BY PRESCRIBER/CLIN PHARMACIST DOCUMENTED: ICD-10-PCS | Mod: CPTII,95,, | Performed by: NURSE PRACTITIONER

## 2022-10-24 PROCEDURE — 99212 PR OFFICE/OUTPT VISIT, EST, LEVL II, 10-19 MIN: ICD-10-PCS | Mod: 95,,, | Performed by: NURSE PRACTITIONER

## 2022-10-24 PROCEDURE — 1159F MED LIST DOCD IN RCRD: CPT | Mod: CPTII,95,, | Performed by: NURSE PRACTITIONER

## 2022-10-24 PROCEDURE — 3061F PR NEG MICROALBUMINURIA RESULT DOCUMENTED/REVIEW: ICD-10-PCS | Mod: CPTII,95,, | Performed by: NURSE PRACTITIONER

## 2022-10-24 PROCEDURE — 3066F PR DOCUMENTATION OF TREATMENT FOR NEPHROPATHY: ICD-10-PCS | Mod: CPTII,95,, | Performed by: NURSE PRACTITIONER

## 2022-10-24 PROCEDURE — 4010F PR ACE/ARB THEARPY RXD/TAKEN: ICD-10-PCS | Mod: CPTII,95,, | Performed by: NURSE PRACTITIONER

## 2022-10-24 PROCEDURE — 1159F PR MEDICATION LIST DOCUMENTED IN MEDICAL RECORD: ICD-10-PCS | Mod: CPTII,95,, | Performed by: NURSE PRACTITIONER

## 2022-10-24 PROCEDURE — 3061F NEG MICROALBUMINURIA REV: CPT | Mod: CPTII,95,, | Performed by: NURSE PRACTITIONER

## 2022-10-24 PROCEDURE — 1160F RVW MEDS BY RX/DR IN RCRD: CPT | Mod: CPTII,95,, | Performed by: NURSE PRACTITIONER

## 2022-10-24 PROCEDURE — 4010F ACE/ARB THERAPY RXD/TAKEN: CPT | Mod: CPTII,95,, | Performed by: NURSE PRACTITIONER

## 2022-10-24 PROCEDURE — 3066F NEPHROPATHY DOC TX: CPT | Mod: CPTII,95,, | Performed by: NURSE PRACTITIONER

## 2022-10-24 PROCEDURE — 99212 OFFICE O/P EST SF 10 MIN: CPT | Mod: 95,,, | Performed by: NURSE PRACTITIONER

## 2022-10-24 RX ORDER — NIFEDIPINE 60 MG/1
60 TABLET, EXTENDED RELEASE ORAL
COMMUNITY
Start: 2022-10-20 | End: 2023-03-08 | Stop reason: SDUPTHER

## 2022-10-24 NOTE — TELEPHONE ENCOUNTER
"Pt reports that he does not take his vitals at home. Pt reported weight is 192 # and height is 5' 8".  " No

## 2022-10-25 ENCOUNTER — TELEPHONE (OUTPATIENT)
Dept: HEMATOLOGY/ONCOLOGY | Facility: CLINIC | Age: 67
End: 2022-10-25
Payer: COMMERCIAL

## 2022-11-07 PROBLEM — G89.29 CHRONIC PAIN OF RIGHT KNEE: Status: ACTIVE | Noted: 2022-11-07

## 2022-11-07 PROBLEM — M25.562 CHRONIC PAIN OF LEFT KNEE: Status: ACTIVE | Noted: 2022-07-05

## 2022-11-07 PROBLEM — M25.561 CHRONIC PAIN OF RIGHT KNEE: Status: ACTIVE | Noted: 2022-11-07

## 2023-01-23 PROBLEM — N17.9 ACUTE KIDNEY INJURY: Status: RESOLVED | Noted: 2022-09-23 | Resolved: 2023-01-23

## 2023-02-27 PROBLEM — N18.30 STAGE 3 CHRONIC KIDNEY DISEASE: Status: ACTIVE | Noted: 2022-08-29

## 2023-02-27 NOTE — PROGRESS NOTES
Subjective:       Patient ID: Juan Mayo is a 67 y.o. male.    PCP: Dr. Dalton Ang  Urology: Dr. Luis Worthy    1. Anemia--Since 2019    Work-up:  2/2020--retic 1.4.  2/2021--iron saturation 22%, ferritin 260, vitamin B12 678.  3/2021--LDH normal, Haptoglobin normal, retic 1.9 (low-normal), folic acid normal, iron saturation 25%, ferritin 200, Radha negative, TSH normal 0.81, peripheral smear with normocytic anemia w/o anisocytosis.  6/2021--SPEP no M-spike, MAY negative, RF normal.    H/H:  02/2019--13.5/41.5  02/2020--12.2/37.7  02/2021--11.5/35.5  03/2021--11.4/34.9  05/2021--12.0/36.5  01/2022--11.2/34.1  06/2022--10.6/32.7  10/2022--11.4/36.7  02/2023--12.3/38.3    2. H/o Prostate Cancer Stage IIA ([id number])--Diagnosed 2018  Biopsy/pathology:  Prostate biopsy 5/2018--10/12 cores positive for adenocarcinoma, [place] 7.  Imaging:  NM Bone Scan 6/6/18 at [facility]--small focus of increased activity overlies posterior right 10th rib, more intense focus of asymmetric activity right of midline T1 segment laterally, most typical degenerative origin, no findings elsewhere.  Prostate MRI w/ and w/o contrast [facility] 6/6/18--biopsy-proven high volume disease largely obscured by extensive biopsy-related hemorrhage, highest volume of disease right apex, no gross extracapsular disease, no seminal vesicle invasion or neurovascular bundle involvement, no skeletal or ranjeet metastases.    PSA:  1/2021--0.09, testosterone 199 (normal 343-1275).    Treatment history:  1. Prostate radiation 9/25/18--11/27/18.  2. Lupron/ADT X 1 year--completed in 7/2019.     Chief Complaint: Other Misc (Pt reports no new concerns today.)      HPI   Patient present for telephone follow-up of anemia. He is doing well. He works at an [employer] outside. His renal function is stable. States his Nephrologist changed some of his BP medications and this seems to have helped. Admits he has been staying hydrated as well. His anemia has improved with Hgb of  12.3 g/dL. Iron level and ferritin normal. His folic acid is normal and he continues on the folic acid daily. He has no new complaints. Continues follow-up with Dr. Worthy for h/o prostate cancer.     Past Medical History:   Diagnosis Date    Bilateral carotid artery stenosis     Essential (primary) hypertension     Fatigue     Hyperglycemia     Hypertriglyceridemia     Obesity, unspecified     Renal insufficiency     Vitamin D deficiency       Review of patient's allergies indicates:  No Known Allergies   Current Outpatient Medications on File Prior to Visit   Medication Sig Dispense Refill    aspirin 81 mg Cap Take 81 mg by mouth.      atorvastatin (LIPITOR) 20 MG tablet TAKE ONE TABLET BY MOUTH IN THE EVENING. 30 tablet 0    fenofibrate 160 MG Tab Take 160 mg by mouth once daily.      folic acid (FOLVITE) 1 MG tablet Take 1 tablet (1 mg total) by mouth once daily. 90 tablet 3    NIFEdipine (PROCARDIA-XL) 60 MG (OSM) 24 hr tablet Take 60 mg by mouth.      cholecalciferol, vitamin D3, (VITAMIN D3) 25 mcg (1,000 unit) capsule Take 25 mcg by mouth.       No current facility-administered medications on file prior to visit.      Review of Systems   Constitutional:  Negative for activity change, fever and unexpected weight change.   Eyes:  Negative for visual disturbance.   Respiratory:  Negative for cough and shortness of breath.    Cardiovascular:  Negative for chest pain.   Gastrointestinal:  Negative for abdominal pain, blood in stool, constipation, diarrhea, nausea and vomiting.   Genitourinary:  Negative for difficulty urinating.   Musculoskeletal:  Negative for back pain.   Integumentary:  Negative for rash.   Neurological:  Negative for dizziness, weakness and headaches.   Psychiatric/Behavioral:  Negative for behavioral problems and suicidal ideas.          Lab Visit on 02/28/2023   Component Date Value    Iron Binding Capacity Un* 02/28/2023 259 (H)     Iron Level 02/28/2023 84     Transferrin 02/28/2023 304      Iron Binding Capacity To* 02/28/2023 343     Iron Saturation 02/28/2023 24     Ferritin Level 02/28/2023 147.49     Folate Level 02/28/2023 16.6     Vitamin B12 Level 02/28/2023 360     Sodium Level 02/28/2023 142     Potassium Level 02/28/2023 3.9     Chloride 02/28/2023 110 (H)     Carbon Dioxide 02/28/2023 25     Glucose Level 02/28/2023 93     Blood Urea Nitrogen 02/28/2023 19.7     Creatinine 02/28/2023 1.33 (H)     Calcium Level Total 02/28/2023 9.5     Protein Total 02/28/2023 6.7     Albumin Level 02/28/2023 4.1     Globulin 02/28/2023 2.6     Albumin/Globulin Ratio 02/28/2023 1.6     Bilirubin Total 02/28/2023 0.5     Alkaline Phosphatase 02/28/2023 53     Alanine Aminotransferase 02/28/2023 19     Aspartate Aminotransfera* 02/28/2023 20     eGFR 02/28/2023 58     WBC 02/28/2023 4.6     RBC 02/28/2023 4.28 (L)     Hgb 02/28/2023 12.3 (L)     Hct 02/28/2023 38.3 (L)     MCV 02/28/2023 89.5     MCH 02/28/2023 28.7     MCHC 02/28/2023 32.1 (L)     RDW 02/28/2023 12.9     Platelet 02/28/2023 178     MPV 02/28/2023 9.6     Neut % 02/28/2023 72.2     Lymph % 02/28/2023 14.9     Mono % 02/28/2023 10.3     Eos % 02/28/2023 1.5     Basophil % 02/28/2023 0.9     Lymph # 02/28/2023 0.68     Neut # 02/28/2023 3.29     Mono # 02/28/2023 0.47     Eos # 02/28/2023 0.07     Baso # 02/28/2023 0.04     IG# 02/28/2023 0.01     IG% 02/28/2023 0.2       Assessment:       Problem List Items Addressed This Visit          Renal/    Stage 3 chronic kidney disease - Primary    Overview     Last Assessment & Plan:   Formatting of this note might be different from the original.  Avoid NSAIDs and CARTER II inhibitors including Ibuprofen, Motrin, Advil, Aleve, Naproxen, Celebrex, Diclofenac and Meloxicam. Avoid contrast imaging and other nephrotoxic agents.  Encourage adequate fluid intake    Last Assessment & Plan:   Formatting of this note might be different from the original.  Avoid NSAIDs and CARTER II inhibitors including Ibuprofen,  Motrin, Advil, Aleve, Naproxen, Celebrex, Diclofenac and Meloxicam. Avoid contrast imaging and other nephrotoxic agents.  Encourage adequate fluid intake    Last Assessment & Plan:   Formatting of this note might be different from the original.  Renal indices continue to improved now off lisinopril.  Avoid NSAIDs and CARTER II inhibitors including Ibuprofen, Motrin, Advil, Aleve, Naproxen, Celebrex, Diclofenac and Meloxicam. Avoid contrast imaging and other nephrotoxic agents.         Relevant Orders    Iron and TIBC    Ferritin    Folate    Vitamin B12    CBC Auto Differential    Comprehensive Metabolic Panel       Oncology    Anemia    Overview     Formatting of this note might be different from the original.  Last Assessment & Plan:   Formatting of this note might be different from the original.  Followed by Dr. Rhianna Zamora; labs ordered.    Last Assessment & Plan:   Formatting of this note might be different from the original.  Followed by Dr. Rhianna Zamora         Relevant Orders    Iron and TIBC    Ferritin    Folate    Vitamin B12    CBC Auto Differential    Comprehensive Metabolic Panel     Other Visit Diagnoses       Folic acid deficiency        Relevant Orders    Iron and TIBC    Ferritin    Folate    Vitamin B12    CBC Auto Differential    Comprehensive Metabolic Panel               Plan:       Patient with mild anemia, remainder of CBCdiff normal.  Iron studies, folic acid, vitamin B12 levels, TSH normal. No evidence of hemolysis. Peripheral smear unremarkable aside from normocytic anemia.  Patient with a h/o prostate cancer diagnosed in 2018, treated with radiation and ADT X 1 year.  Last dose of Lupron was 7/2019.  PSA from 1/2021 good but testosterone remains low.  Suspected initially anemia may be lingering effect from Lupron and low testosterone.  Additional work-up for anemia to r/o other causes negative.    Recent labs show improved anemia with Hgb of 12.3 g/dL. Iron level normal at 84, ferritin at  147.49.   Folate level normal. Continue Folic acid 1mg daily.   His renal function is also better and is now back to his baseline with creatinine of 1.33. Continues with close follow-up with Nephrology.   Will plan for continued observation only.  RTC in 4 months for follow-up with repeat labs.  Consider bone marrow biopsy in if anemia becomes progressively worse and no other etiology apparent.  All questions answered at this time.    This was a Telephone visit. The patient consented to the consult held via telephone. The phone call took place with myself and the patient only according to [facility] protocol. I, distant Nurse Practitioner, conducted the visit from [facility]. The patient participated in the visit at a non-[facility] location selected by the patient, identified at his/her home. I am licensed in the state where the patient stated he or she was located. The patient stated that he/she understood and accepted the privacy and security risks to their information at their location. Total time spent on the phone was 10 minutes.       SANDI Balderrama

## 2023-02-28 ENCOUNTER — LAB VISIT (OUTPATIENT)
Dept: LAB | Facility: HOSPITAL | Age: 68
End: 2023-02-28
Attending: INTERNAL MEDICINE
Payer: COMMERCIAL

## 2023-02-28 DIAGNOSIS — N18.9 ANEMIA DUE TO CHRONIC KIDNEY DISEASE, UNSPECIFIED CKD STAGE: ICD-10-CM

## 2023-02-28 DIAGNOSIS — D63.1 ANEMIA DUE TO CHRONIC KIDNEY DISEASE, UNSPECIFIED CKD STAGE: ICD-10-CM

## 2023-02-28 LAB
ALBUMIN SERPL-MCNC: 4.1 G/DL (ref 3.4–4.8)
ALBUMIN/GLOB SERPL: 1.6 RATIO (ref 1.1–2)
ALP SERPL-CCNC: 53 UNIT/L (ref 40–150)
ALT SERPL-CCNC: 19 UNIT/L (ref 0–55)
AST SERPL-CCNC: 20 UNIT/L (ref 5–34)
BASOPHILS # BLD AUTO: 0.04 X10(3)/MCL (ref 0–0.2)
BASOPHILS NFR BLD AUTO: 0.9 %
BILIRUBIN DIRECT+TOT PNL SERPL-MCNC: 0.5 MG/DL
BUN SERPL-MCNC: 19.7 MG/DL (ref 8.4–25.7)
CALCIUM SERPL-MCNC: 9.5 MG/DL (ref 8.8–10)
CHLORIDE SERPL-SCNC: 110 MMOL/L (ref 98–107)
CO2 SERPL-SCNC: 25 MMOL/L (ref 23–31)
CREAT SERPL-MCNC: 1.33 MG/DL (ref 0.73–1.18)
EOSINOPHIL # BLD AUTO: 0.07 X10(3)/MCL (ref 0–0.9)
EOSINOPHIL NFR BLD AUTO: 1.5 %
ERYTHROCYTE [DISTWIDTH] IN BLOOD BY AUTOMATED COUNT: 12.9 % (ref 11.5–17)
FERRITIN SERPL-MCNC: 147.49 NG/ML (ref 21.81–274.66)
FOLATE SERPL-MCNC: 16.6 NG/ML (ref 7–31.4)
GFR SERPLBLD CREATININE-BSD FMLA CKD-EPI: 58 MLS/MIN/1.73/M2
GLOBULIN SER-MCNC: 2.6 GM/DL (ref 2.4–3.5)
GLUCOSE SERPL-MCNC: 93 MG/DL (ref 82–115)
HCT VFR BLD AUTO: 38.3 % (ref 42–52)
HGB BLD-MCNC: 12.3 G/DL (ref 14–18)
IMM GRANULOCYTES # BLD AUTO: 0.01 X10(3)/MCL (ref 0–0.04)
IMM GRANULOCYTES NFR BLD AUTO: 0.2 %
IRON SATN MFR SERPL: 24 % (ref 20–50)
IRON SERPL-MCNC: 84 UG/DL (ref 65–175)
LYMPHOCYTES # BLD AUTO: 0.68 X10(3)/MCL (ref 0.6–4.6)
LYMPHOCYTES NFR BLD AUTO: 14.9 %
MCH RBC QN AUTO: 28.7 PG
MCHC RBC AUTO-ENTMCNC: 32.1 G/DL (ref 33–36)
MCV RBC AUTO: 89.5 FL (ref 80–94)
MONOCYTES # BLD AUTO: 0.47 X10(3)/MCL (ref 0.1–1.3)
MONOCYTES NFR BLD AUTO: 10.3 %
NEUTROPHILS # BLD AUTO: 3.29 X10(3)/MCL (ref 2.1–9.2)
NEUTROPHILS NFR BLD AUTO: 72.2 %
PLATELET # BLD AUTO: 178 X10(3)/MCL (ref 130–400)
PMV BLD AUTO: 9.6 FL (ref 7.4–10.4)
POTASSIUM SERPL-SCNC: 3.9 MMOL/L (ref 3.5–5.1)
PROT SERPL-MCNC: 6.7 GM/DL (ref 5.8–7.6)
RBC # BLD AUTO: 4.28 X10(6)/MCL (ref 4.7–6.1)
SODIUM SERPL-SCNC: 142 MMOL/L (ref 136–145)
TIBC SERPL-MCNC: 259 UG/DL (ref 69–240)
TIBC SERPL-MCNC: 343 UG/DL (ref 250–450)
TRANSFERRIN SERPL-MCNC: 304 MG/DL (ref 163–344)
VIT B12 SERPL-MCNC: 360 PG/ML (ref 213–816)
WBC # SPEC AUTO: 4.6 X10(3)/MCL (ref 4.5–11.5)

## 2023-02-28 PROCEDURE — 82607 VITAMIN B-12: CPT

## 2023-02-28 PROCEDURE — 83550 IRON BINDING TEST: CPT

## 2023-02-28 PROCEDURE — 85025 COMPLETE CBC W/AUTO DIFF WBC: CPT

## 2023-02-28 PROCEDURE — 36415 COLL VENOUS BLD VENIPUNCTURE: CPT

## 2023-02-28 PROCEDURE — 82728 ASSAY OF FERRITIN: CPT

## 2023-02-28 PROCEDURE — 82746 ASSAY OF FOLIC ACID SERUM: CPT

## 2023-02-28 PROCEDURE — 80053 COMPREHEN METABOLIC PANEL: CPT

## 2023-03-06 ENCOUNTER — OFFICE VISIT (OUTPATIENT)
Dept: HEMATOLOGY/ONCOLOGY | Facility: CLINIC | Age: 68
End: 2023-03-06
Payer: COMMERCIAL

## 2023-03-06 VITALS — HEIGHT: 70 IN | WEIGHT: 188 LBS | BODY MASS INDEX: 26.92 KG/M2

## 2023-03-06 DIAGNOSIS — D63.1 ANEMIA DUE TO CHRONIC KIDNEY DISEASE, UNSPECIFIED CKD STAGE: ICD-10-CM

## 2023-03-06 DIAGNOSIS — N18.9 ANEMIA DUE TO CHRONIC KIDNEY DISEASE, UNSPECIFIED CKD STAGE: ICD-10-CM

## 2023-03-06 DIAGNOSIS — N18.31 STAGE 3A CHRONIC KIDNEY DISEASE: Primary | ICD-10-CM

## 2023-03-06 DIAGNOSIS — E53.8 FOLIC ACID DEFICIENCY: ICD-10-CM

## 2023-03-06 PROBLEM — I12.9 BENIGN HYPERTENSIVE KIDNEY DISEASE WITH CHRONIC KIDNEY DISEASE: Status: ACTIVE | Noted: 2022-08-29

## 2023-03-06 PROBLEM — Z00.00 MEDICARE ANNUAL WELLNESS VISIT, SUBSEQUENT: Status: ACTIVE | Noted: 2023-03-06

## 2023-03-06 PROCEDURE — 3008F PR BODY MASS INDEX (BMI) DOCUMENTED: ICD-10-PCS | Mod: CPTII,95,, | Performed by: NURSE PRACTITIONER

## 2023-03-06 PROCEDURE — 3288F FALL RISK ASSESSMENT DOCD: CPT | Mod: CPTII,95,, | Performed by: NURSE PRACTITIONER

## 2023-03-06 PROCEDURE — 1160F RVW MEDS BY RX/DR IN RCRD: CPT | Mod: CPTII,95,, | Performed by: NURSE PRACTITIONER

## 2023-03-06 PROCEDURE — 1159F MED LIST DOCD IN RCRD: CPT | Mod: CPTII,95,, | Performed by: NURSE PRACTITIONER

## 2023-03-06 PROCEDURE — 3288F PR FALLS RISK ASSESSMENT DOCUMENTED: ICD-10-PCS | Mod: CPTII,95,, | Performed by: NURSE PRACTITIONER

## 2023-03-06 PROCEDURE — 1101F PR PT FALLS ASSESS DOC 0-1 FALLS W/OUT INJ PAST YR: ICD-10-PCS | Mod: CPTII,95,, | Performed by: NURSE PRACTITIONER

## 2023-03-06 PROCEDURE — 1160F PR REVIEW ALL MEDS BY PRESCRIBER/CLIN PHARMACIST DOCUMENTED: ICD-10-PCS | Mod: CPTII,95,, | Performed by: NURSE PRACTITIONER

## 2023-03-06 PROCEDURE — 3008F BODY MASS INDEX DOCD: CPT | Mod: CPTII,95,, | Performed by: NURSE PRACTITIONER

## 2023-03-06 PROCEDURE — 99212 OFFICE O/P EST SF 10 MIN: CPT | Mod: 95,,, | Performed by: NURSE PRACTITIONER

## 2023-03-06 PROCEDURE — 1159F PR MEDICATION LIST DOCUMENTED IN MEDICAL RECORD: ICD-10-PCS | Mod: CPTII,95,, | Performed by: NURSE PRACTITIONER

## 2023-03-06 PROCEDURE — 99212 PR OFFICE/OUTPT VISIT, EST, LEVL II, 10-19 MIN: ICD-10-PCS | Mod: 95,,, | Performed by: NURSE PRACTITIONER

## 2023-03-06 PROCEDURE — 1101F PT FALLS ASSESS-DOCD LE1/YR: CPT | Mod: CPTII,95,, | Performed by: NURSE PRACTITIONER

## 2023-03-06 RX ORDER — VIT C/E/ZN/COPPR/LUTEIN/ZEAXAN 250MG-90MG
25 CAPSULE ORAL
COMMUNITY

## 2023-03-08 PROBLEM — N18.1 STAGE 1 CHRONIC KIDNEY DISEASE: Status: ACTIVE | Noted: 2022-08-29

## 2023-03-08 PROBLEM — E78.2 MIXED HYPERLIPIDEMIA: Status: ACTIVE | Noted: 2023-03-08

## 2023-06-05 PROBLEM — N17.9 ACUTE KIDNEY INJURY: Status: RESOLVED | Noted: 2022-09-23 | Resolved: 2023-06-05

## 2023-06-12 PROBLEM — Z00.00 MEDICARE ANNUAL WELLNESS VISIT, SUBSEQUENT: Status: RESOLVED | Noted: 2023-03-06 | Resolved: 2023-06-12

## 2023-07-06 NOTE — PROGRESS NOTES
Subjective:       Patient ID: Juan Mayo is a 68 y.o. male.    PCP: Dr. Dalton Ang  Urology: Dr. Luis Worthy    1. Anemia--Since 2019    Work-up:  2/2020--retic 1.4.  2/2021--iron saturation 22%, ferritin 260, vitamin B12 678.  3/2021--LDH normal, Haptoglobin normal, retic 1.9 (low-normal), folic acid normal, iron saturation 25%, ferritin 200, Radha negative, TSH normal 0.81, peripheral smear with normocytic anemia w/o anisocytosis.  6/2021--SPEP no M-spike, MAY negative, RF normal.    H/H:  02/2019--13.5/41.5  02/2020--12.2/37.7  02/2021--11.5/35.5  03/2021--11.4/34.9  05/2021--12.0/36.5  01/2022--11.2/34.1  06/2022--10.6/32.7  10/2022--11.4/36.7  02/2023--12.3/38.3    2. H/o Prostate Cancer Stage IIA ([id number])--Diagnosed 2018  Biopsy/pathology:  Prostate biopsy 5/2018--10/12 cores positive for adenocarcinoma, [place] 7.  Imaging:  NM Bone Scan 6/6/18 at [facility]--small focus of increased activity overlies posterior right 10th rib, more intense focus of asymmetric activity right of midline T1 segment laterally, most typical degenerative origin, no findings elsewhere.  Prostate MRI w/ and w/o contrast [facility] 6/6/18--biopsy-proven high volume disease largely obscured by extensive biopsy-related hemorrhage, highest volume of disease right apex, no gross extracapsular disease, no seminal vesicle invasion or neurovascular bundle involvement, no skeletal or ranjeet metastases.    PSA:  1/2021--0.09, testosterone 199 (normal 343-1275).    Treatment history:  1. Prostate radiation 9/25/18--11/27/18.  2. Lupron/ADT X 1 year--completed in 7/2019.     Chief Complaint: Other Misc (Pt reports no new concerns today.)    HPI   Patient present for telephone follow-up of anemia. He is doing well. 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 His renal function is normal. Admits he has been staying hydrated. Labs show stable, mild anemia with Hgb of 12.3 g/dL.Iron level and ferritin normal. His folic acid is normal and he continues on the  folic acid daily. He has no new complaints. Continues follow-up with Dr. Worthy for h/o prostate cancer.     Past Medical History:   Diagnosis Date    Bilateral carotid artery stenosis     Essential (primary) hypertension     Fatigue     Hyperglycemia     Vitamin D deficiency       Review of patient's allergies indicates:  No Known Allergies   Current Outpatient Medications on File Prior to Visit   Medication Sig Dispense Refill    aspirin 81 mg Cap Take 81 mg by mouth.      atorvastatin (LIPITOR) 20 MG tablet Take 1 tablet (20 mg total) by mouth every evening. 30 tablet 11    cholecalciferol, vitamin D3, (VITAMIN D3) 25 mcg (1,000 unit) capsule Take 25 mcg by mouth.      fenofibrate 160 MG Tab Take 1 tablet (160 mg total) by mouth once daily. 30 tablet 11    folic acid (FOLVITE) 1 MG tablet Take 1 tablet (1 mg total) by mouth once daily. 90 tablet 1    NIFEdipine (PROCARDIA-XL) 60 MG (OSM) 24 hr tablet Take 1 tablet (60 mg total) by mouth once daily. 30 tablet 11     No current facility-administered medications on file prior to visit.      Review of Systems   Constitutional:  Negative for activity change, fever and unexpected weight change.   Eyes:  Negative for visual disturbance.   Respiratory:  Negative for cough and shortness of breath.    Cardiovascular:  Negative for chest pain.   Gastrointestinal:  Negative for abdominal pain, blood in stool, constipation, diarrhea, nausea and vomiting.   Genitourinary:  Negative for difficulty urinating.   Musculoskeletal:  Negative for back pain.   Integumentary:  Negative for rash.   Neurological:  Negative for dizziness, weakness and headaches.   Psychiatric/Behavioral:  Negative for behavioral problems and suicidal ideas.          Lab Visit on 07/13/2023   Component Date Value    Iron Binding Capacity Un* 07/13/2023 269 (H)     Iron Level 07/13/2023 84     Transferrin 07/13/2023 305     Iron Binding Capacity To* 07/13/2023 353     Iron Saturation 07/13/2023 24      Ferritin Level 07/13/2023 194.27     Folate Level 07/13/2023 16.3     Vitamin B12 Level 07/13/2023 440     Sodium Level 07/13/2023 140     Potassium Level 07/13/2023 3.9     Chloride 07/13/2023 107     Carbon Dioxide 07/13/2023 23     Glucose Level 07/13/2023 114     Blood Urea Nitrogen 07/13/2023 24.8     Creatinine 07/13/2023 1.14     Calcium Level Total 07/13/2023 9.7     Protein Total 07/13/2023 6.9     Albumin Level 07/13/2023 4.3     Globulin 07/13/2023 2.6     Albumin/Globulin Ratio 07/13/2023 1.7     Bilirubin Total 07/13/2023 0.5     Alkaline Phosphatase 07/13/2023 54     Alanine Aminotransferase 07/13/2023 23     Aspartate Aminotransfera* 07/13/2023 25     eGFR 07/13/2023 >60     WBC 07/13/2023 4.96     RBC 07/13/2023 4.18 (L)     Hgb 07/13/2023 12.3 (L)     Hct 07/13/2023 38.2 (L)     MCV 07/13/2023 91.4     MCH 07/13/2023 29.4     MCHC 07/13/2023 32.2 (L)     RDW 07/13/2023 12.8     Platelet 07/13/2023 192     MPV 07/13/2023 9.8     Neut % 07/13/2023 70.8     Lymph % 07/13/2023 17.5     Mono % 07/13/2023 8.7     Eos % 07/13/2023 2.2     Basophil % 07/13/2023 0.6     Lymph # 07/13/2023 0.87     Neut # 07/13/2023 3.51     Mono # 07/13/2023 0.43     Eos # 07/13/2023 0.11     Baso # 07/13/2023 0.03     IG# 07/13/2023 0.01     IG% 07/13/2023 0.2       Assessment:       Problem List Items Addressed This Visit          Renal/    Stage 1 chronic kidney disease - Primary    Overview     Last Assessment & Plan:   Formatting of this note might be different from the original.  Avoid NSAIDs and CARTER II inhibitors including Ibuprofen, Motrin, Advil, Aleve, Naproxen, Celebrex, Diclofenac and Meloxicam. Avoid contrast imaging and other nephrotoxic agents.  Encourage adequate fluid intake  Current creatinine 1.33.                Oncology    Anemia    Overview     Formatting of this note might be different from the original.  Last Assessment & Plan:   Formatting of this note might be different from the  original.  Followed by Dr. Rhianna Zamora; labs ordered.    Last Assessment & Plan:   Formatting of this note might be different from the original.  Followed by Dr. Rhianna Zamora    Current hemoglobin and hematocrit 12.3 and 38.3.               Plan:       Patient with mild anemia, remainder of CBCdiff normal.  Iron studies, folic acid, vitamin B12 levels, TSH normal. No evidence of hemolysis. Peripheral smear unremarkable aside from normocytic anemia.  Patient with a h/o prostate cancer diagnosed in 2018, treated with radiation and ADT X 1 year.  Last dose of Lupron was 7/2019.  PSA from 1/2021 good but testosterone remains low.  Suspected initially anemia may be lingering effect from Lupron and low testosterone.  Additional work-up for anemia to r/o other causes negative.    Recent labs show stable anemia with Hgb of 12.3 g/dL. Iron level normal at 84, ferritin at 194.27.   Folate level normal. Continue Folic acid 1mg daily.   His renal function is also better,normal. Continues with close follow-up with Nephrology.   Will plan for continued observation only.  RTC in 6 months for follow-up with repeat labs.  Consider bone marrow biopsy in if anemia becomes progressively worse and no other etiology apparent.  All questions answered at this time.    This was a Telephone visit. The patient consented to the consult held via telephone. The phone call took place with myself and the patient only according to [facility] protocol. I, distant Nurse Practitioner, conducted the visit from [facility]. The patient participated in the visit at a non-[facility] location selected by the patient, identified at his place of employment. I am licensed in the state where the patient stated he was located. The patient stated that he understood and accepted the privacy and security risks to their information at their location. Total time spent on the phone was 10 minutes.       Alden Obando [facility]

## 2023-07-13 ENCOUNTER — LAB VISIT (OUTPATIENT)
Dept: LAB | Facility: HOSPITAL | Age: 68
End: 2023-07-13
Attending: INTERNAL MEDICINE
Payer: COMMERCIAL

## 2023-07-13 DIAGNOSIS — N18.9 ANEMIA DUE TO CHRONIC KIDNEY DISEASE, UNSPECIFIED CKD STAGE: ICD-10-CM

## 2023-07-13 DIAGNOSIS — D63.1 ANEMIA DUE TO CHRONIC KIDNEY DISEASE, UNSPECIFIED CKD STAGE: ICD-10-CM

## 2023-07-13 DIAGNOSIS — N18.31 STAGE 3A CHRONIC KIDNEY DISEASE: ICD-10-CM

## 2023-07-13 DIAGNOSIS — E53.8 FOLIC ACID DEFICIENCY: ICD-10-CM

## 2023-07-13 LAB
ALBUMIN SERPL-MCNC: 4.3 G/DL (ref 3.4–4.8)
ALBUMIN/GLOB SERPL: 1.7 RATIO (ref 1.1–2)
ALP SERPL-CCNC: 54 UNIT/L (ref 40–150)
ALT SERPL-CCNC: 23 UNIT/L (ref 0–55)
AST SERPL-CCNC: 25 UNIT/L (ref 5–34)
BASOPHILS # BLD AUTO: 0.03 X10(3)/MCL
BASOPHILS NFR BLD AUTO: 0.6 %
BILIRUBIN DIRECT+TOT PNL SERPL-MCNC: 0.5 MG/DL
BUN SERPL-MCNC: 24.8 MG/DL (ref 8.4–25.7)
CALCIUM SERPL-MCNC: 9.7 MG/DL (ref 8.8–10)
CHLORIDE SERPL-SCNC: 107 MMOL/L (ref 98–107)
CO2 SERPL-SCNC: 23 MMOL/L (ref 23–31)
CREAT SERPL-MCNC: 1.14 MG/DL (ref 0.73–1.18)
EOSINOPHIL # BLD AUTO: 0.11 X10(3)/MCL (ref 0–0.9)
EOSINOPHIL NFR BLD AUTO: 2.2 %
ERYTHROCYTE [DISTWIDTH] IN BLOOD BY AUTOMATED COUNT: 12.8 % (ref 11.5–17)
FERRITIN SERPL-MCNC: 194.27 NG/ML (ref 21.81–274.66)
FOLATE SERPL-MCNC: 16.3 NG/ML (ref 7–31.4)
GFR SERPLBLD CREATININE-BSD FMLA CKD-EPI: >60 MLS/MIN/1.73/M2
GLOBULIN SER-MCNC: 2.6 GM/DL (ref 2.4–3.5)
GLUCOSE SERPL-MCNC: 114 MG/DL (ref 82–115)
HCT VFR BLD AUTO: 38.2 % (ref 42–52)
HGB BLD-MCNC: 12.3 G/DL (ref 14–18)
IMM GRANULOCYTES # BLD AUTO: 0.01 X10(3)/MCL (ref 0–0.04)
IMM GRANULOCYTES NFR BLD AUTO: 0.2 %
IRON SATN MFR SERPL: 24 % (ref 20–50)
IRON SERPL-MCNC: 84 UG/DL (ref 65–175)
LYMPHOCYTES # BLD AUTO: 0.87 X10(3)/MCL (ref 0.6–4.6)
LYMPHOCYTES NFR BLD AUTO: 17.5 %
MCH RBC QN AUTO: 29.4 PG (ref 27–31)
MCHC RBC AUTO-ENTMCNC: 32.2 G/DL (ref 33–36)
MCV RBC AUTO: 91.4 FL (ref 80–94)
MONOCYTES # BLD AUTO: 0.43 X10(3)/MCL (ref 0.1–1.3)
MONOCYTES NFR BLD AUTO: 8.7 %
NEUTROPHILS # BLD AUTO: 3.51 X10(3)/MCL (ref 2.1–9.2)
NEUTROPHILS NFR BLD AUTO: 70.8 %
PLATELET # BLD AUTO: 192 X10(3)/MCL (ref 130–400)
PMV BLD AUTO: 9.8 FL (ref 7.4–10.4)
POTASSIUM SERPL-SCNC: 3.9 MMOL/L (ref 3.5–5.1)
PROT SERPL-MCNC: 6.9 GM/DL (ref 5.8–7.6)
RBC # BLD AUTO: 4.18 X10(6)/MCL (ref 4.7–6.1)
SODIUM SERPL-SCNC: 140 MMOL/L (ref 136–145)
TIBC SERPL-MCNC: 269 UG/DL (ref 69–240)
TIBC SERPL-MCNC: 353 UG/DL (ref 250–450)
TRANSFERRIN SERPL-MCNC: 305 MG/DL (ref 163–344)
VIT B12 SERPL-MCNC: 440 PG/ML (ref 213–816)
WBC # SPEC AUTO: 4.96 X10(3)/MCL (ref 4.5–11.5)

## 2023-07-13 PROCEDURE — 85025 COMPLETE CBC W/AUTO DIFF WBC: CPT

## 2023-07-13 PROCEDURE — 36415 COLL VENOUS BLD VENIPUNCTURE: CPT

## 2023-07-13 PROCEDURE — 82728 ASSAY OF FERRITIN: CPT

## 2023-07-13 PROCEDURE — 82607 VITAMIN B-12: CPT

## 2023-07-13 PROCEDURE — 82746 ASSAY OF FOLIC ACID SERUM: CPT

## 2023-07-13 PROCEDURE — 83550 IRON BINDING TEST: CPT

## 2023-07-13 PROCEDURE — 80053 COMPREHEN METABOLIC PANEL: CPT

## 2023-07-20 ENCOUNTER — TELEPHONE (OUTPATIENT)
Dept: HEMATOLOGY/ONCOLOGY | Facility: CLINIC | Age: 68
End: 2023-07-20

## 2023-07-20 ENCOUNTER — OFFICE VISIT (OUTPATIENT)
Dept: HEMATOLOGY/ONCOLOGY | Facility: CLINIC | Age: 68
End: 2023-07-20
Payer: COMMERCIAL

## 2023-07-20 VITALS — BODY MASS INDEX: 27.28 KG/M2 | WEIGHT: 180 LBS | HEIGHT: 68 IN

## 2023-07-20 DIAGNOSIS — E53.8 FOLIC ACID DEFICIENCY: ICD-10-CM

## 2023-07-20 DIAGNOSIS — N18.9 ANEMIA DUE TO CHRONIC KIDNEY DISEASE, UNSPECIFIED CKD STAGE: ICD-10-CM

## 2023-07-20 DIAGNOSIS — N18.1 STAGE 1 CHRONIC KIDNEY DISEASE: Primary | ICD-10-CM

## 2023-07-20 DIAGNOSIS — D63.1 ANEMIA DUE TO CHRONIC KIDNEY DISEASE, UNSPECIFIED CKD STAGE: ICD-10-CM

## 2023-07-20 PROCEDURE — 3044F HG A1C LEVEL LT 7.0%: CPT | Mod: CPTII,95,, | Performed by: NURSE PRACTITIONER

## 2023-07-20 PROCEDURE — 3288F PR FALLS RISK ASSESSMENT DOCUMENTED: ICD-10-PCS | Mod: CPTII,95,, | Performed by: NURSE PRACTITIONER

## 2023-07-20 PROCEDURE — 3288F FALL RISK ASSESSMENT DOCD: CPT | Mod: CPTII,95,, | Performed by: NURSE PRACTITIONER

## 2023-07-20 PROCEDURE — 99212 OFFICE O/P EST SF 10 MIN: CPT | Mod: 95,,, | Performed by: NURSE PRACTITIONER

## 2023-07-20 PROCEDURE — 99212 PR OFFICE/OUTPT VISIT, EST, LEVL II, 10-19 MIN: ICD-10-PCS | Mod: 95,,, | Performed by: NURSE PRACTITIONER

## 2023-07-20 PROCEDURE — 1101F PT FALLS ASSESS-DOCD LE1/YR: CPT | Mod: CPTII,95,, | Performed by: NURSE PRACTITIONER

## 2023-07-20 PROCEDURE — 1126F AMNT PAIN NOTED NONE PRSNT: CPT | Mod: CPTII,95,, | Performed by: NURSE PRACTITIONER

## 2023-07-20 PROCEDURE — 1126F PR PAIN SEVERITY QUANTIFIED, NO PAIN PRESENT: ICD-10-PCS | Mod: CPTII,95,, | Performed by: NURSE PRACTITIONER

## 2023-07-20 PROCEDURE — 1160F RVW MEDS BY RX/DR IN RCRD: CPT | Mod: CPTII,95,, | Performed by: NURSE PRACTITIONER

## 2023-07-20 PROCEDURE — 3008F PR BODY MASS INDEX (BMI) DOCUMENTED: ICD-10-PCS | Mod: CPTII,95,, | Performed by: NURSE PRACTITIONER

## 2023-07-20 PROCEDURE — 3044F PR MOST RECENT HEMOGLOBIN A1C LEVEL <7.0%: ICD-10-PCS | Mod: CPTII,95,, | Performed by: NURSE PRACTITIONER

## 2023-07-20 PROCEDURE — 1160F PR REVIEW ALL MEDS BY PRESCRIBER/CLIN PHARMACIST DOCUMENTED: ICD-10-PCS | Mod: CPTII,95,, | Performed by: NURSE PRACTITIONER

## 2023-07-20 PROCEDURE — 1159F PR MEDICATION LIST DOCUMENTED IN MEDICAL RECORD: ICD-10-PCS | Mod: CPTII,95,, | Performed by: NURSE PRACTITIONER

## 2023-07-20 PROCEDURE — 3008F BODY MASS INDEX DOCD: CPT | Mod: CPTII,95,, | Performed by: NURSE PRACTITIONER

## 2023-07-20 PROCEDURE — 1101F PR PT FALLS ASSESS DOC 0-1 FALLS W/OUT INJ PAST YR: ICD-10-PCS | Mod: CPTII,95,, | Performed by: NURSE PRACTITIONER

## 2023-07-20 PROCEDURE — 1159F MED LIST DOCD IN RCRD: CPT | Mod: CPTII,95,, | Performed by: NURSE PRACTITIONER

## 2023-07-20 RX ORDER — FOLIC ACID 1 MG/1
1 TABLET ORAL DAILY
Qty: 90 TABLET | Refills: 3 | Status: SHIPPED | OUTPATIENT
Start: 2023-07-20 | End: 2024-03-19

## 2024-01-22 PROBLEM — N18.4 CHRONIC KIDNEY DISEASE, STAGE IV (SEVERE): Status: ACTIVE | Noted: 2024-01-22

## 2024-01-22 PROBLEM — E21.3 HYPERPARATHYROIDISM: Status: ACTIVE | Noted: 2024-01-22

## 2024-01-22 PROBLEM — C61 MALIGNANT NEOPLASM OF PROSTATE: Status: ACTIVE | Noted: 2024-01-22

## 2024-01-24 ENCOUNTER — LAB VISIT (OUTPATIENT)
Dept: LAB | Facility: HOSPITAL | Age: 69
End: 2024-01-24
Attending: NURSE PRACTITIONER
Payer: COMMERCIAL

## 2024-01-24 DIAGNOSIS — N18.1 STAGE 1 CHRONIC KIDNEY DISEASE: ICD-10-CM

## 2024-01-24 DIAGNOSIS — E53.8 FOLIC ACID DEFICIENCY: ICD-10-CM

## 2024-01-24 DIAGNOSIS — D63.1 ANEMIA DUE TO CHRONIC KIDNEY DISEASE, UNSPECIFIED CKD STAGE: ICD-10-CM

## 2024-01-24 DIAGNOSIS — N18.9 ANEMIA DUE TO CHRONIC KIDNEY DISEASE, UNSPECIFIED CKD STAGE: ICD-10-CM

## 2024-01-24 LAB
ALBUMIN SERPL-MCNC: 4.2 G/DL (ref 3.4–4.8)
ALBUMIN/GLOB SERPL: 1.5 RATIO (ref 1.1–2)
ALP SERPL-CCNC: 59 UNIT/L (ref 40–150)
ALT SERPL-CCNC: 26 UNIT/L (ref 0–55)
AST SERPL-CCNC: 28 UNIT/L (ref 5–34)
BASOPHILS # BLD AUTO: 0.03 X10(3)/MCL
BASOPHILS NFR BLD AUTO: 0.6 %
BILIRUB SERPL-MCNC: 0.5 MG/DL
BUN SERPL-MCNC: 27 MG/DL (ref 8.4–25.7)
CALCIUM SERPL-MCNC: 9.8 MG/DL (ref 8.8–10)
CHLORIDE SERPL-SCNC: 111 MMOL/L (ref 98–107)
CO2 SERPL-SCNC: 26 MMOL/L (ref 23–31)
CREAT SERPL-MCNC: 1.91 MG/DL (ref 0.73–1.18)
EOSINOPHIL # BLD AUTO: 0.1 X10(3)/MCL (ref 0–0.9)
EOSINOPHIL NFR BLD AUTO: 2 %
ERYTHROCYTE [DISTWIDTH] IN BLOOD BY AUTOMATED COUNT: 12.6 % (ref 11.5–17)
FERRITIN SERPL-MCNC: 204.35 NG/ML (ref 21.81–274.66)
FOLATE SERPL-MCNC: 14.6 NG/ML (ref 7–31.4)
GFR SERPLBLD CREATININE-BSD FMLA CKD-EPI: 37 MLS/MIN/1.73/M2
GLOBULIN SER-MCNC: 2.8 GM/DL (ref 2.4–3.5)
GLUCOSE SERPL-MCNC: 89 MG/DL (ref 82–115)
HCT VFR BLD AUTO: 38.4 % (ref 42–52)
HGB BLD-MCNC: 12.7 G/DL (ref 14–18)
IMM GRANULOCYTES # BLD AUTO: 0.01 X10(3)/MCL (ref 0–0.04)
IMM GRANULOCYTES NFR BLD AUTO: 0.2 %
IRON SATN MFR SERPL: 26 % (ref 20–50)
IRON SERPL-MCNC: 89 UG/DL (ref 65–175)
LYMPHOCYTES # BLD AUTO: 0.84 X10(3)/MCL (ref 0.6–4.6)
LYMPHOCYTES NFR BLD AUTO: 17.2 %
MCH RBC QN AUTO: 29.8 PG (ref 27–31)
MCHC RBC AUTO-ENTMCNC: 33.1 G/DL (ref 33–36)
MCV RBC AUTO: 90.1 FL (ref 80–94)
MONOCYTES # BLD AUTO: 0.45 X10(3)/MCL (ref 0.1–1.3)
MONOCYTES NFR BLD AUTO: 9.2 %
NEUTROPHILS # BLD AUTO: 3.46 X10(3)/MCL (ref 2.1–9.2)
NEUTROPHILS NFR BLD AUTO: 70.8 %
PLATELET # BLD AUTO: 177 X10(3)/MCL (ref 130–400)
PMV BLD AUTO: 9.9 FL (ref 7.4–10.4)
POTASSIUM SERPL-SCNC: 3.9 MMOL/L (ref 3.5–5.1)
PROT SERPL-MCNC: 7 GM/DL (ref 5.8–7.6)
RBC # BLD AUTO: 4.26 X10(6)/MCL (ref 4.7–6.1)
SODIUM SERPL-SCNC: 143 MMOL/L (ref 136–145)
TIBC SERPL-MCNC: 257 UG/DL (ref 69–240)
TIBC SERPL-MCNC: 346 UG/DL (ref 250–450)
TRANSFERRIN SERPL-MCNC: 315 MG/DL (ref 163–344)
VIT B12 SERPL-MCNC: 519 PG/ML (ref 213–816)
WBC # SPEC AUTO: 4.89 X10(3)/MCL (ref 4.5–11.5)

## 2024-01-24 PROCEDURE — 85025 COMPLETE CBC W/AUTO DIFF WBC: CPT

## 2024-01-24 PROCEDURE — 82728 ASSAY OF FERRITIN: CPT

## 2024-01-24 PROCEDURE — 36415 COLL VENOUS BLD VENIPUNCTURE: CPT

## 2024-01-24 PROCEDURE — 82607 VITAMIN B-12: CPT

## 2024-01-24 PROCEDURE — 82746 ASSAY OF FOLIC ACID SERUM: CPT

## 2024-01-24 PROCEDURE — 80053 COMPREHEN METABOLIC PANEL: CPT

## 2024-01-24 PROCEDURE — 83540 ASSAY OF IRON: CPT

## 2024-01-30 ENCOUNTER — TELEPHONE (OUTPATIENT)
Dept: HEMATOLOGY/ONCOLOGY | Facility: CLINIC | Age: 69
End: 2024-01-30
Payer: COMMERCIAL

## 2024-01-30 PROBLEM — N18.2 CHRONIC KIDNEY DISEASE, STAGE II (MILD): Status: ACTIVE | Noted: 2022-08-29

## 2024-01-31 ENCOUNTER — OFFICE VISIT (OUTPATIENT)
Dept: HEMATOLOGY/ONCOLOGY | Facility: CLINIC | Age: 69
End: 2024-01-31
Payer: COMMERCIAL

## 2024-01-31 VITALS — HEIGHT: 68 IN | BODY MASS INDEX: 29.55 KG/M2 | WEIGHT: 195 LBS

## 2024-01-31 DIAGNOSIS — C61 MALIGNANT NEOPLASM OF PROSTATE: ICD-10-CM

## 2024-01-31 DIAGNOSIS — N18.9 ANEMIA DUE TO CHRONIC KIDNEY DISEASE, UNSPECIFIED CKD STAGE: ICD-10-CM

## 2024-01-31 DIAGNOSIS — D63.1 ANEMIA DUE TO CHRONIC KIDNEY DISEASE, UNSPECIFIED CKD STAGE: ICD-10-CM

## 2024-01-31 DIAGNOSIS — N18.2 CHRONIC KIDNEY DISEASE, STAGE II (MILD): Primary | ICD-10-CM

## 2024-01-31 DIAGNOSIS — Z85.46 PERSONAL HISTORY OF PROSTATE CANCER: ICD-10-CM

## 2024-01-31 PROCEDURE — 1159F MED LIST DOCD IN RCRD: CPT | Mod: CPTII,95,, | Performed by: NURSE PRACTITIONER

## 2024-01-31 PROCEDURE — 99212 OFFICE O/P EST SF 10 MIN: CPT | Mod: 95,,, | Performed by: NURSE PRACTITIONER

## 2024-01-31 PROCEDURE — 1160F RVW MEDS BY RX/DR IN RCRD: CPT | Mod: CPTII,95,, | Performed by: NURSE PRACTITIONER

## 2024-01-31 PROCEDURE — 3008F BODY MASS INDEX DOCD: CPT | Mod: CPTII,95,, | Performed by: NURSE PRACTITIONER

## 2024-01-31 NOTE — PROGRESS NOTES
Subjective:       Patient ID: Juan Mayo is a 68 y.o. male.    PCP: Dr. Dalton Ang  Urology: Dr. Luis Worthy    1. Anemia--Since 2019    Work-up:  2/2020--retic 1.4.  2/2021--iron saturation 22%, ferritin 260, vitamin B12 678.  3/2021--LDH normal, Haptoglobin normal, retic 1.9 (low-normal), folic acid normal, iron saturation 25%, ferritin 200, Radha negative, TSH normal 0.81, peripheral smear with normocytic anemia w/o anisocytosis.  6/2021--SPEP no M-spike, MAY negative, RF normal.    H/H:  02/2019--13.5/41.5  02/2020--12.2/37.7  02/2021--11.5/35.5  03/2021--11.4/34.9  05/2021--12.0/36.5  01/2022--11.2/34.1  06/2022--10.6/32.7  10/2022--11.4/36.7  02/2023--12.3/38.3  01/2024--12.7/38.4    2. H/o Prostate Cancer Stage IIA ([id number])--Diagnosed 2018  Biopsy/pathology:  Prostate biopsy 5/2018--10/12 cores positive for adenocarcinoma, [place] 7.  Imaging:  NM Bone Scan 6/6/18 at [facility]--small focus of increased activity overlies posterior right 10th rib, more intense focus of asymmetric activity right of midline T1 segment laterally, most typical degenerative origin, no findings elsewhere.  Prostate MRI w/ and w/o contrast [facility] 6/6/18--biopsy-proven high volume disease largely obscured by extensive biopsy-related hemorrhage, highest volume of disease right apex, no gross extracapsular disease, no seminal vesicle invasion or neurovascular bundle involvement, no skeletal or ranjeet metastases.    PSA:  1/2021--0.09, testosterone 199 (normal 343-1275).    Treatment history:  1. Prostate radiation 9/25/18--11/27/18.  2. Lupron/ADT X 1 year--completed in 7/2019.     Chief Complaint: Results (No new concerns. )    HPI   Patient present for telephone follow-up of anemia. He is doing well. 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 Recent labs show renal insufficiency and he does admit that he has decreased his water intake. Labs show stable, mild anemia with Hgb of 12.7 g/dL.Iron level and ferritin normal.  His folic acid is normal and he continues on the folic acid daily. He has no new complaints. Continues follow-up with Dr. Worthy for h/o prostate cancer.     Past Medical History:   Diagnosis Date    Bilateral carotid artery stenosis     Essential (primary) hypertension     Fatigue     Hyperglycemia     Vitamin D deficiency       Review of patient's allergies indicates:  No Known Allergies   Current Outpatient Medications on File Prior to Visit   Medication Sig Dispense Refill    aspirin 81 mg Cap Take 81 mg by mouth.      atorvastatin (LIPITOR) 20 MG tablet Take 1 tablet (20 mg total) by mouth every evening. 30 tablet 11    cholecalciferol, vitamin D3, (VITAMIN D3) 25 mcg (1,000 unit) capsule Take 25 mcg by mouth.      fenofibrate 160 MG Tab Take 1 tablet (160 mg total) by mouth once daily. 30 tablet 11    folic acid (FOLVITE) 1 MG tablet Take 1 tablet (1 mg total) by mouth once daily. 90 tablet 3    NIFEdipine (PROCARDIA-XL) 60 MG (OSM) 24 hr tablet Take 1 tablet (60 mg total) by mouth once daily. 30 tablet 11     No current facility-administered medications on file prior to visit.      Review of Systems   Constitutional:  Negative for activity change, fever and unexpected weight change.   Eyes:  Negative for visual disturbance.   Respiratory:  Negative for cough and shortness of breath.    Cardiovascular:  Negative for chest pain.   Gastrointestinal:  Negative for abdominal pain, blood in stool, constipation, diarrhea, nausea and vomiting.   Genitourinary:  Negative for difficulty urinating.   Musculoskeletal:  Negative for back pain.   Integumentary:  Negative for rash.   Neurological:  Negative for dizziness, weakness and headaches.   Psychiatric/Behavioral:  Negative for behavioral problems and suicidal ideas.          Lab Visit on 01/24/2024   Component Date Value    Iron Binding Capacity Un* 01/24/2024 257 (H)     Iron Level 01/24/2024 89     Transferrin 01/24/2024 315     Iron Binding Capacity To* 01/24/2024  346     Iron Saturation 01/24/2024 26     Ferritin Level 01/24/2024 204.35     Folate Level 01/24/2024 14.6     Vitamin B12 Level 01/24/2024 519     Sodium Level 01/24/2024 143     Potassium Level 01/24/2024 3.9     Chloride 01/24/2024 111 (H)     Carbon Dioxide 01/24/2024 26     Glucose Level 01/24/2024 89     Blood Urea Nitrogen 01/24/2024 27.0 (H)     Creatinine 01/24/2024 1.91 (H)     Calcium Level Total 01/24/2024 9.8     Protein Total 01/24/2024 7.0     Albumin Level 01/24/2024 4.2     Globulin 01/24/2024 2.8     Albumin/Globulin Ratio 01/24/2024 1.5     Bilirubin Total 01/24/2024 0.5     Alkaline Phosphatase 01/24/2024 59     Alanine Aminotransferase 01/24/2024 26     Aspartate Aminotransfera* 01/24/2024 28     eGFR 01/24/2024 37     WBC 01/24/2024 4.89     RBC 01/24/2024 4.26 (L)     Hgb 01/24/2024 12.7 (L)     Hct 01/24/2024 38.4 (L)     MCV 01/24/2024 90.1     MCH 01/24/2024 29.8     MCHC 01/24/2024 33.1     RDW 01/24/2024 12.6     Platelet 01/24/2024 177     MPV 01/24/2024 9.9     Neut % 01/24/2024 70.8     Lymph % 01/24/2024 17.2     Mono % 01/24/2024 9.2     Eos % 01/24/2024 2.0     Basophil % 01/24/2024 0.6     Lymph # 01/24/2024 0.84     Neut # 01/24/2024 3.46     Mono # 01/24/2024 0.45     Eos # 01/24/2024 0.10     Baso # 01/24/2024 0.03     IG# 01/24/2024 0.01     IG% 01/24/2024 0.2       Assessment:       Problem List Items Addressed This Visit          Renal/    Chronic kidney disease, stage II (mild) - Primary    Overview     Last Assessment & Plan:   Formatting of this note might be different from the original.  Avoid NSAIDs and CARTER II inhibitors including Ibuprofen, Motrin, Advil, Aleve, Naproxen, Celebrex, Diclofenac and Meloxicam. Avoid contrast imaging and other nephrotoxic agents.  Encourage adequate fluid intake  Current creatinine 1.33.  Last Assessment & Plan:    Formatting of this note might be different from the original.   Renal indices continue to improved now off lisinopril.    Avoid NSAIDs and CARTER II inhibitors including Ibuprofen, Motrin, Advil, Aleve, Naproxen, Celebrex, Diclofenac and Meloxicam. Avoid contrast imaging and other nephrotoxic agents.         Relevant Orders    Iron and TIBC    Ferritin    Folate    Vitamin B12    CBC Auto Differential    Comprehensive Metabolic Panel       Oncology    Personal history of prostate cancer    Overview     Last Assessment & Plan:   Formatting of this note might be different from the original.  Post radiation    Last Assessment & Plan:   Formatting of this note might be different from the original.  Post radiation.    Followed by Dr. Worthy; has upcoming PSA an appointment with Dr. Worthy.         Relevant Orders    Iron and TIBC    Ferritin    Folate    Vitamin B12    CBC Auto Differential    Comprehensive Metabolic Panel    Anemia    Overview     Formatting of this note might be different from the original.  Last Assessment & Plan:   Formatting of this note might be different from the original.  Followed by Dr. Rhianna Zamora; labs ordered.    Last Assessment & Plan:   Formatting of this note might be different from the original.  Followed by Dr. Rhianna Zamora    Current hemoglobin and hematocrit 12.3 and 38.3.         Relevant Orders    Iron and TIBC    Ferritin    Folate    Vitamin B12    CBC Auto Differential    Comprehensive Metabolic Panel    Malignant neoplasm of prostate    Relevant Orders    Iron and TIBC    Ferritin    Folate    Vitamin B12    CBC Auto Differential    Comprehensive Metabolic Panel          Plan:       Patient with mild anemia, remainder of CBCdiff normal.  Iron studies, folic acid, vitamin B12 levels, TSH normal. No evidence of hemolysis. Peripheral smear unremarkable aside from normocytic anemia.  Patient with a h/o prostate cancer diagnosed in 2018, treated with radiation and ADT X 1 year.  Last dose of Lupron was 7/2019.  PSA from 1/2021 good but testosterone remains low.  Suspected initially anemia may be lingering  effect from Lupron and low testosterone.  Additional work-up for anemia to r/o other causes negative.    Recent labs show stable anemia with Hgb of 12.7 g/dL. Iron level normal at 89, ferritin at 204.35.   Folate level normal. Continue Folic acid 1mg daily.   His renal function is abnormal, evidence of renal insufficiency. He admits to decreased water intake since he has been busy. Continues with close follow-up with Nephrology.   Will plan for continued observation only.  RTC in 6 months for follow-up with repeat labs.  Consider bone marrow biopsy in if anemia becomes progressively worse and no other etiology apparent.  All questions answered at this time.    This was a Telephone visit. The patient consented to the consult held via telephone. The phone call took place with myself and the patient only according to [facility] protocol. I, distant Nurse Practitioner, conducted the visit from [facility]. The patient participated in the visit at a non-[facility] location selected by the patient, identified at his place of employment. I am licensed in the state where the patient stated he was located. The patient stated that he understood and accepted the privacy and security risks to their information at their location. Total time spent on the phone was 10 minutes.       SANDI Balderrama

## 2024-02-20 PROBLEM — R31.0 GROSS HEMATURIA: Status: ACTIVE | Noted: 2024-02-20

## 2024-02-20 PROBLEM — Z09 HOSPITAL DISCHARGE FOLLOW-UP: Status: ACTIVE | Noted: 2024-02-20

## 2024-02-20 PROBLEM — N30.91 HEMORRHAGIC CYSTITIS: Status: ACTIVE | Noted: 2024-02-20

## 2024-02-21 NOTE — PROGRESS NOTES
.Follow-up (Hospital f/u for urinary) and Urinary Retention (Hospital f/u for urinary retention 2/13/24)         HPI:    Patient presented to the emergency department on 02/12/2024 at [facility].  He presented with hematuria with blood clots.  He describes some dysuria and spasm but nothing significant.  CMP significant for BUN 27, creatinine 1.45, sodium level 146, glucose 112.  CBC significant for hemoglobin 12.7 and hematocrit 37.3.  CT of abdomen and pelvis suggest hematoma in the dependent urinary bladder.    Patient had 3 L of continuous bladder irrigation via 3 way Sarah.  Patient diagnosed with hemorrhagic cystitis and gross hematuria.  He was discharged on Omnicef.  Patient re-presented to the ER on 02/13/2024.  Upon returning home 02/12/2024, patient noted persistent gross hematuria.  Urinalysis revealed large hemoglobin, positive nitrite, moderate leukocyte esterase, 5-10 white blood cells, greater than 100 blood cells, small bacteria and small mucus.  Patient was evaluated by urologist with no indications for urgent cystoscopy.  Urology recommended can continuous bladder irrigation and IV antibiotics for UTI.  Patient noted to have clot retention; he was subsequently irrigated with another 2 L normal saline with a copious amount of clot removed.  Hematuria improved with clear urine.  Sarah was discontinued.  He was discharged home on 02/14/2024.    He has been feeling well.  He denies dysuria, frequency, urgency, or hematuria.   He denies any fever/chills, nausea/vomiting or lower abdominal pain.     Current Outpatient Medications:     atorvastatin (LIPITOR) 20 MG tablet, TAKE ONE TABLET BY MOUTH EVERY EVENING, Disp: 30 tablet, Rfl: 11    cholecalciferol, vitamin D3, (VITAMIN D3) 25 mcg (1,000 unit) capsule, Take 25 mcg by mouth., Disp: , Rfl:     fenofibrate 160 MG Tab, TAKE 1 TABLET (160 MG TOTAL) BY MOUTH ONCE DAILY., Disp: 30 tablet, Rfl: 11    folic acid (FOLVITE) 1 MG tablet, Take 1 tablet (1 mg total)  by mouth once daily., Disp: 90 tablet, Rfl: 3    NIFEdipine (PROCARDIA-XL) 30 MG (OSM) 24 hr tablet, Take 1 tablet by mouth every morning., Disp: , Rfl:     aspirin 81 mg Cap, Take 81 mg by mouth., Disp: , Rfl:     multivitamin (MULTIPLE VITAMIN ORAL), Take 1 capsule by mouth., Disp: , Rfl:     NIFEdipine (PROCARDIA-XL) 60 MG (OSM) 24 hr tablet, Take 1 tablet (60 mg total) by mouth once daily. (Patient not taking: Reported on 2/22/2024), Disp: 30 tablet, Rfl: 11      ROS:    See HPI.      PE:    ..  Vitals:    02/22/24 1041   BP: 112/62   Pulse:    Temp:         General:  He is well-developed well-nourished elderly white male in no apparent distress.  He is alert and oriented.    Chest: Clear to auscultation bilaterally.    CV: Regular rate rhythm without murmurs rubs gallops.        1. Hospital discharge follow-up  Overview:  Patient presents for hospital discharge follow-up.    See HPI.  Patient has been doing well since discharge.    Patient has follow-up with Dr. Worthy on Monday.      2. Hemorrhagic cystitis  Overview:  Resolved; patient has finishes antibiotics.    Patient has follow-up with Dr. Worthy on Monday.      3. Gross hematuria  Overview:  Resolved; patient is urinating normally.    Patient has follow-up with Dr. Worthy on Monday.      4. Essential (primary) hypertension  Overview:  Controlled; continue current medication.  Refills sent.    Limit sodium consumption.    02/22/2024:  Medications recently adjusted by Dr. Sampson.  His blood pressure is well controlled.                ..No follow-ups on file.

## 2024-02-22 ENCOUNTER — OFFICE VISIT (OUTPATIENT)
Dept: PRIMARY CARE CLINIC | Facility: CLINIC | Age: 69
End: 2024-02-22
Payer: COMMERCIAL

## 2024-02-22 VITALS
TEMPERATURE: 98 F | HEIGHT: 68 IN | DIASTOLIC BLOOD PRESSURE: 62 MMHG | OXYGEN SATURATION: 100 % | WEIGHT: 185.88 LBS | SYSTOLIC BLOOD PRESSURE: 112 MMHG | BODY MASS INDEX: 28.17 KG/M2 | HEART RATE: 65 BPM

## 2024-02-22 DIAGNOSIS — R31.0 GROSS HEMATURIA: ICD-10-CM

## 2024-02-22 DIAGNOSIS — I10 ESSENTIAL (PRIMARY) HYPERTENSION: ICD-10-CM

## 2024-02-22 DIAGNOSIS — N30.91 HEMORRHAGIC CYSTITIS: ICD-10-CM

## 2024-02-22 DIAGNOSIS — Z09 HOSPITAL DISCHARGE FOLLOW-UP: Primary | ICD-10-CM

## 2024-02-22 PROCEDURE — 99214 OFFICE O/P EST MOD 30 MIN: CPT | Mod: ,,, | Performed by: FAMILY MEDICINE

## 2024-02-22 PROCEDURE — 3008F BODY MASS INDEX DOCD: CPT | Mod: CPTII,,, | Performed by: FAMILY MEDICINE

## 2024-02-22 PROCEDURE — 4010F ACE/ARB THERAPY RXD/TAKEN: CPT | Mod: CPTII,,, | Performed by: FAMILY MEDICINE

## 2024-02-22 PROCEDURE — 1126F AMNT PAIN NOTED NONE PRSNT: CPT | Mod: CPTII,,, | Performed by: FAMILY MEDICINE

## 2024-02-22 PROCEDURE — 1101F PT FALLS ASSESS-DOCD LE1/YR: CPT | Mod: CPTII,,, | Performed by: FAMILY MEDICINE

## 2024-02-22 PROCEDURE — 3074F SYST BP LT 130 MM HG: CPT | Mod: CPTII,,, | Performed by: FAMILY MEDICINE

## 2024-02-22 PROCEDURE — 1159F MED LIST DOCD IN RCRD: CPT | Mod: CPTII,,, | Performed by: FAMILY MEDICINE

## 2024-02-22 PROCEDURE — 1160F RVW MEDS BY RX/DR IN RCRD: CPT | Mod: CPTII,,, | Performed by: FAMILY MEDICINE

## 2024-02-22 PROCEDURE — 3078F DIAST BP <80 MM HG: CPT | Mod: CPTII,,, | Performed by: FAMILY MEDICINE

## 2024-02-22 PROCEDURE — 3288F FALL RISK ASSESSMENT DOCD: CPT | Mod: CPTII,,, | Performed by: FAMILY MEDICINE

## 2024-02-22 RX ORDER — NIFEDIPINE 30 MG/1
1 TABLET, EXTENDED RELEASE ORAL EVERY MORNING
COMMUNITY
Start: 2024-02-07 | End: 2024-03-19 | Stop reason: SDUPTHER

## 2024-03-17 NOTE — PROGRESS NOTES
..Annual Exam       HPI:    Pt presents for Wellness exam.  He has been feeling well.  He sleeps well.   His appetite is good.  He is  with 1 daughter.  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  He does not smoke.  He has a few beers daily.  He has coffee infrequently. He may have 1 soft drink every 1-2 weeks.  He does not exercise. He is active at work.  Urologist: Dr Worthy; saw him recently for hospital follow up.  Vascular: Dr Gutierrez; has not seen him in 2 years.  Hematologist: Dr Zamora; saw her 4 months ago  Nephro: Dr Oliva; saw him a few months ago.  Colonoscopy 3/13/2024: Dr Artemio Mark, follow up in 10 years.      The patient's Health Maintenance was reviewed and the following appears to be due at this time:   Health Maintenance Due   Topic Date Due    Hepatitis C Screening  Never done    Aspirin/Antiplatelet Therapy  Never done    RSV Vaccine (Age 60+ and Pregnant patients) (1 - 1-dose 60+ series) Never done    Influenza Vaccine (1) 09/01/2023    COVID-19 Vaccine (3 - 2023-24 season) 09/01/2023    Colorectal Cancer Screening  02/05/2024       ..  Past Medical History:   Diagnosis Date    Bilateral carotid artery stenosis     Essential (primary) hypertension     Fatigue     Urinary retention     Vitamin D deficiency           ..  Past Surgical History:   Procedure Laterality Date    COLONOSCOPY  02/27/2004    COLONOSCOPY  02/05/2014    [place] teeth extracted              Current Outpatient Medications:     cholecalciferol, vitamin D3, (VITAMIN D3) 25 mcg (1,000 unit) capsule, Take 25 mcg by mouth., Disp: , Rfl:     folic acid (FOLVITE) 1 MG tablet, Take 1 tablet (1 mg total) by mouth once daily., Disp: 90 tablet, Rfl: 3    multivitamin (MULTIPLE VITAMIN ORAL), Take 1 capsule by mouth., Disp: , Rfl:     aspirin 81 mg Cap, Take 81 mg by mouth., Disp: , Rfl:     atorvastatin (LIPITOR) 20 MG tablet, Take 1 tablet (20 mg total) by mouth every evening., Disp: 30 tablet, Rfl: 11    fenofibrate 160 MG Tab,  Take 1 tablet (160 mg total) by mouth once daily., Disp: 30 tablet, Rfl: 11    NIFEdipine (PROCARDIA-XL) 30 MG (OSM) 24 hr tablet, Take 1 tablet (30 mg total) by mouth every morning., Disp: 30 tablet, Rfl: 11    ramipriL (ALTACE) 5 MG capsule, Take 1 tablet by mouth daily., Disp: 30 capsule, Rfl: 11      ..  Social History     Socioeconomic History    Marital status:     Number of children: 1   Occupational History    Occupation: Mechanical work: [employer]   Tobacco Use    Smoking status: Never    Smokeless tobacco: Never   Substance and Sexual Activity    Alcohol use: Yes     Comment: Beer Daily    Drug use: Never     Social Determinants of Health     Financial Resource Strain: Low Risk  (2/16/2024)    Overall Financial Resource Strain (CARDIA)     Difficulty of Paying Living Expenses: Not hard at all   Food Insecurity: No Food Insecurity (2/16/2024)    Hunger Vital Sign     Worried About Running Out of Food in the Last Year: Never true     Ran Out of Food in the Last Year: Never true   Transportation Needs: No Transportation Needs (2/16/2024)    PRAPARE - Transportation     Lack of Transportation (Medical): No     Lack of Transportation (Non-Medical): No   Physical Activity: Insufficiently Active (2/16/2024)    Exercise Vital Sign     Days of Exercise per Week: 2 days     Minutes of Exercise per Session: 10 min   Stress: No Stress Concern Present (2/16/2024)    [place] [employer] - Occupational Stress Questionnaire     Feeling of Stress : Not at all   Social Connections: Unknown (2/16/2024)    Social Connection and Isolation Panel [NHANES]     Frequency of Communication with Friends and Family: Twice a week     Frequency of Social Gatherings with Friends and Family: Once a week     Active Member of Clubs or Organizations: No     Attends Club or Organization Meetings: Patient declined     Marital Status:    Housing Stability: Low Risk  (2/16/2024)    Housing Stability  Vital Sign     Unable to Pay for Housing in the Last Year: No     Number of Places Lived in the Last Year: 1     Unstable Housing in the Last Year: No          ..  Family History   Problem Relation Age of Onset    Hypertension Mother     Stroke Mother     No Known Problems Father     No Known Problems Sister     No Known Problems Sister     No Known Problems Brother           ..Review of patient's allergies indicates:  No Known Allergies       ..  Immunization History   Administered Date(s) Administered    COVID-19, MRNA, LN-S, PF (Pfizer) (Purple Cap) 05/27/2021, 06/17/2021    Influenza - Quadrivalent - PF *Preferred* (6 months and older) 10/21/2020, 10/11/2021, 10/11/2022    Pneumococcal Conjugate - 13 Valent 02/15/2016    Pneumococcal Polysaccharide - 23 Valent 02/18/2019    Td (ADULT) 02/05/2003    Tdap 03/13/2012, 03/04/2022    Zoster 02/11/2014    Zoster Recombinant 02/21/2019, 03/04/2022          REVIEW OF SYSTEMS:    GENERAL: No weight loss, no weight gain, no fever, no fatigue, no chills, no night sweats  HEENT: No sore throat, no ear pain, no sinus pressure, no nasal congestion, no rhinorrhea, no decreased hearing, no snoring  VISION: No vision changes, no blurry vision, no double vision, no glaucoma, no cataracts, + glasses  LAST EYE EXAM: within the last year; [facility]  NECK: no LAD  CARDIAC: No chest pain, no palpitations, no dyspnea on exertion, no orthopnea, + hypertension, + mixed hyperlipidemia, + bilateral carotid artery stenosis  RESPIRATORY: No cough, no wheezing, no sputum production, no SOB  GI: No abdominal pain, no N/V, no heartburn, no constipation, no diarrhea, no blood in stool, (- ) family history of Colon Ca  : No dysuria, no hematuria, no frequency, no urgency, no incontinence, no testicular pain/swelling, + history of Prostate Ca, + chronic kidney disease  MUSC/SKEL: No myalgia, no weakness, no edema, + arthralgia: + chronic right knee pain, no joint swelling/effusions  SKIN:  No rashes, no hives, no itching, no sores  NEURO: No HA, no numbness, no tingling, no weakness, no dizziness  PSYCH: No anxiety, no depression, no irritability, no panic attacks, no s/i, no h/i, no hallucinations  ENDO: No polyuria, no polyphagia, no polydipsia, + vitamin D deficiency  HEME: No bruising, no bleeding disorders, + anemia.     PHYSICAL EXAM: Physical Exam     ..  Vitals:    03/19/24 0856   BP: 138/73   Pulse: 67   Temp: 98.1 °F (36.7 °C)      General: Well developed, well nourished white male in no apparent distress, alert and oriented x3  Skin: No rash or abnormal lesions  HEENT: Normocephalic, PERRLA, EOMI, mouth WNL, throat WNL, nares normal, EAC and TM WNL bilaterally  Neck: FROM, no LAD, no thyroid abnormalities palpable  Chest: CTA bilaterally, no wheezes crackles or rubs  Cardiac: RRR, no murmurs, rubs, gallops  Abdomen: Soft, nontender, nondistended, NBSx4, no rebound tenderness or guarding, no HSM  Extremities: No clubbing, cyanosis, or edema. Joints WNL, +2 DP/PT pulses bilaterally  Neuro: No sensory or motor defects noted. CN II-XII intact. Gait WNL.  Genital: Deferred  Rectal: Deferred      1. Medicare annual wellness visit, subsequent  Overview:  Patient presents for wellness examination.   He has been feeling well.  Hematologist:  Dr. Rhianna Zamora; recent virtual visit.  Hemoglobin and hematocrit stable.  Labs reviewed.    Vascular:  Dr. Gutierrez; bilateral carotid artery stenosis.  Patien is on baby aspirin.    Urologist:  Dr. Worthy; history of prostate cancer.   Last colonoscopy 2014 with Dr. Artemio Mark; follow-up in 10 years.  Labs pending:  Lipid profile, vitamin-D an A1c.    03/19/2024:  Patient presents for wellness examination.   He has been feeling well.  Hematologist:  Dr. Rhianna Zamora; recent virtual visit.  Hemoglobin and hematocrit stable.  Labs reviewed.    Vascular:  Dr. Gutierrez; bilateral carotid artery stenosis.  Patient encouraged to resume baby aspirin.    Urologist:   Dr. Worthy; history of prostate cancer.  Last colonoscopy 3/13/2024:  Dr. Artemio Mark; follow-up in 10 years.  Chronic renal insufficiency: Stable; followed by Dr. Oliva.  Obtain last progress note/labs.  Patient patient advised to get an RSV vaccine.      2. Essential (primary) hypertension  Overview:  Controlled; continue current medication.  Refills sent.    Limit sodium consumption.    02/22/2024:  Medications recently adjusted by Dr. Sampson.  His blood pressure is well controlled.      3. Mixed hyperlipidemia  Overview:  Patient has been compliant with fenofibrate and Lipitor; refills sent.    Continue low-cholesterol/low-fat diet.   Patient encouraged to lose weight.  Lipid profile pending.    03/19/2024:  Patient has been compliant with medications; refills sent.    Continue low-cholesterol/low-fat diet.    Patient encouraged to lose weight.    Obtain recent labs from his nephrologist, Dr. Oliva.    Orders:  -     atorvastatin (LIPITOR) 20 MG tablet; Take 1 tablet (20 mg total) by mouth every evening.  Dispense: 30 tablet; Refill: 11  -     fenofibrate 160 MG Tab; Take 1 tablet (160 mg total) by mouth once daily.  Dispense: 30 tablet; Refill: 11    4. Bilateral carotid artery stenosis  Overview:  Followed by Dr. Gutierrez.  Patient is on a baby aspirin.    03/19/2024:  Patient has not seen Dr. Gutierrze in a few years.  Patient encouraged to follow-up with him.    Patient advised to resume baby aspirin; this was held when patient had recent hematuria.      5. Chronic kidney disease, stage II (mild)  Overview:  Last Assessment & Plan:   Formatting of this note might be different from the original.  Avoid NSAIDs and CARTER II inhibitors including Ibuprofen, Motrin, Advil, Aleve, Naproxen, Celebrex, Diclofenac and Meloxicam. Avoid contrast imaging and other nephrotoxic agents.  Encourage adequate fluid intake  Current creatinine 1.33.  Last Assessment & Plan:    Formatting of this note might be different from the  original.   Renal indices continue to improved now off lisinopril.   Avoid NSAIDs and CARTER II inhibitors including Ibuprofen, Motrin, Advil, Aleve, Naproxen, Celebrex, Diclofenac and Meloxicam. Avoid contrast imaging and other nephrotoxic agents.    03/19/2024: Stable; followed by Dr. Oliva; obtain last progress note.      6. Malignant neoplasm of prostate  Overview:  Stable; followed by Dr. Worthy.      7. Anemia due to chronic kidney disease, unspecified CKD stage  Overview:  Formatting of this note might be different from the original.  Last Assessment & Plan:   Formatting of this note might be different from the original.  Followed by Dr. Rhianna Zamora; labs ordered.    Last Assessment & Plan:   Formatting of this note might be different from the original.  Followed by Dr. Rhianna Zamora    Current hemoglobin and hematocrit 12.3 and 38.3.    03/19/2024:  Hemoglobin and hematocrit 2/14/2024: 11.7/36.1.  Followed by hematologist, Dr. Rhianna Zamora      8. Vitamin D deficiency  Overview:  Patient is on 1000 units daily.    Vitamin-D level pending.    03/19/2024:  Will obtain last progress note/labs from Dr. Oliva.      9. Hyperparathyroidism  Overview:  Secondary to chronic kidney disease; followed by Dr. Oliva.      Other orders  -     NIFEdipine (PROCARDIA-XL) 30 MG (OSM) 24 hr tablet; Take 1 tablet (30 mg total) by mouth every morning.  Dispense: 30 tablet; Refill: 11  -     ramipriL (ALTACE) 5 MG capsule; Take 1 tablet by mouth daily.  Dispense: 30 capsule; Refill: 11         ..Follow up in about 1 year (around 3/19/2025) for Wellness exam.

## 2024-03-19 ENCOUNTER — DOCUMENTATION ONLY (OUTPATIENT)
Dept: PRIMARY CARE CLINIC | Facility: CLINIC | Age: 69
End: 2024-03-19

## 2024-03-19 ENCOUNTER — OFFICE VISIT (OUTPATIENT)
Dept: PRIMARY CARE CLINIC | Facility: CLINIC | Age: 69
End: 2024-03-19
Payer: COMMERCIAL

## 2024-03-19 VITALS
OXYGEN SATURATION: 100 % | SYSTOLIC BLOOD PRESSURE: 138 MMHG | HEART RATE: 67 BPM | DIASTOLIC BLOOD PRESSURE: 73 MMHG | WEIGHT: 185.69 LBS | HEIGHT: 68 IN | TEMPERATURE: 98 F | BODY MASS INDEX: 28.14 KG/M2

## 2024-03-19 DIAGNOSIS — E21.3 HYPERPARATHYROIDISM: ICD-10-CM

## 2024-03-19 DIAGNOSIS — C61 MALIGNANT NEOPLASM OF PROSTATE: ICD-10-CM

## 2024-03-19 DIAGNOSIS — Z00.00 MEDICARE ANNUAL WELLNESS VISIT, SUBSEQUENT: Primary | ICD-10-CM

## 2024-03-19 DIAGNOSIS — D63.1 ANEMIA DUE TO CHRONIC KIDNEY DISEASE, UNSPECIFIED CKD STAGE: ICD-10-CM

## 2024-03-19 DIAGNOSIS — E78.2 MIXED HYPERLIPIDEMIA: ICD-10-CM

## 2024-03-19 DIAGNOSIS — I10 ESSENTIAL (PRIMARY) HYPERTENSION: ICD-10-CM

## 2024-03-19 DIAGNOSIS — N18.9 ANEMIA DUE TO CHRONIC KIDNEY DISEASE, UNSPECIFIED CKD STAGE: ICD-10-CM

## 2024-03-19 DIAGNOSIS — N18.2 CHRONIC KIDNEY DISEASE, STAGE II (MILD): ICD-10-CM

## 2024-03-19 DIAGNOSIS — I65.23 BILATERAL CAROTID ARTERY STENOSIS: ICD-10-CM

## 2024-03-19 DIAGNOSIS — E55.9 VITAMIN D DEFICIENCY: ICD-10-CM

## 2024-03-19 PROBLEM — N18.31 STAGE 3A CHRONIC KIDNEY DISEASE: Status: ACTIVE | Noted: 2024-03-19

## 2024-03-19 PROCEDURE — 1160F RVW MEDS BY RX/DR IN RCRD: CPT | Mod: CPTII,,, | Performed by: FAMILY MEDICINE

## 2024-03-19 PROCEDURE — 3075F SYST BP GE 130 - 139MM HG: CPT | Mod: CPTII,,, | Performed by: FAMILY MEDICINE

## 2024-03-19 PROCEDURE — 1159F MED LIST DOCD IN RCRD: CPT | Mod: CPTII,,, | Performed by: FAMILY MEDICINE

## 2024-03-19 PROCEDURE — 99397 PER PM REEVAL EST PAT 65+ YR: CPT | Mod: ,,, | Performed by: FAMILY MEDICINE

## 2024-03-19 PROCEDURE — 1158F ADVNC CARE PLAN TLK DOCD: CPT | Mod: CPTII,,, | Performed by: FAMILY MEDICINE

## 2024-03-19 PROCEDURE — 1124F ACP DISCUSS-NO DSCNMKR DOCD: CPT | Mod: CPTII,,, | Performed by: FAMILY MEDICINE

## 2024-03-19 PROCEDURE — 1101F PT FALLS ASSESS-DOCD LE1/YR: CPT | Mod: CPTII,,, | Performed by: FAMILY MEDICINE

## 2024-03-19 PROCEDURE — 3008F BODY MASS INDEX DOCD: CPT | Mod: CPTII,,, | Performed by: FAMILY MEDICINE

## 2024-03-19 PROCEDURE — 3288F FALL RISK ASSESSMENT DOCD: CPT | Mod: CPTII,,, | Performed by: FAMILY MEDICINE

## 2024-03-19 PROCEDURE — 1126F AMNT PAIN NOTED NONE PRSNT: CPT | Mod: CPTII,,, | Performed by: FAMILY MEDICINE

## 2024-03-19 PROCEDURE — 4010F ACE/ARB THERAPY RXD/TAKEN: CPT | Mod: CPTII,,, | Performed by: FAMILY MEDICINE

## 2024-03-19 PROCEDURE — 3078F DIAST BP <80 MM HG: CPT | Mod: CPTII,,, | Performed by: FAMILY MEDICINE

## 2024-03-19 RX ORDER — ATORVASTATIN CALCIUM 20 MG/1
20 TABLET, FILM COATED ORAL NIGHTLY
Qty: 30 TABLET | Refills: 11 | Status: SHIPPED | OUTPATIENT
Start: 2024-03-19

## 2024-03-19 RX ORDER — NIFEDIPINE 30 MG/1
30 TABLET, EXTENDED RELEASE ORAL EVERY MORNING
Qty: 30 TABLET | Refills: 11 | Status: SHIPPED | OUTPATIENT
Start: 2024-03-19 | End: 2025-03-19

## 2024-03-19 RX ORDER — FENOFIBRATE 160 MG/1
160 TABLET ORAL DAILY
Qty: 30 TABLET | Refills: 11 | Status: SHIPPED | OUTPATIENT
Start: 2024-03-19 | End: 2025-03-19

## 2024-03-19 RX ORDER — RAMIPRIL 5 MG/1
CAPSULE ORAL
COMMUNITY
Start: 2024-02-07 | End: 2024-03-19 | Stop reason: SDUPTHER

## 2024-03-19 RX ORDER — RAMIPRIL 5 MG/1
CAPSULE ORAL
Qty: 30 CAPSULE | Refills: 11 | Status: SHIPPED | OUTPATIENT
Start: 2024-03-19

## 2024-05-27 PROBLEM — Z09 HOSPITAL DISCHARGE FOLLOW-UP: Status: RESOLVED | Noted: 2024-02-20 | Resolved: 2024-05-27

## 2024-06-24 PROBLEM — Z00.00 MEDICARE ANNUAL WELLNESS VISIT, SUBSEQUENT: Status: RESOLVED | Noted: 2023-03-06 | Resolved: 2024-06-24

## 2024-07-31 PROBLEM — N18.9 CHRONIC KIDNEY DISEASE: Status: ACTIVE | Noted: 2022-08-29

## 2024-07-31 PROBLEM — D63.1 ANEMIA IN CHRONIC KIDNEY DISEASE: Status: ACTIVE | Noted: 2022-08-29

## 2024-07-31 PROBLEM — I10 HIGH BLOOD PRESSURE: Status: ACTIVE | Noted: 2024-03-17

## 2024-07-31 PROBLEM — N18.9 ANEMIA IN CHRONIC KIDNEY DISEASE: Status: ACTIVE | Noted: 2022-08-29

## 2024-07-31 PROBLEM — R31.0 GROSS HEMATURIA: Status: ACTIVE | Noted: 2024-02-14

## 2024-07-31 PROBLEM — N13.9 URINARY OBSTRUCTION: Status: ACTIVE | Noted: 2024-02-14

## 2024-07-31 PROBLEM — K57.30 DIVERTICULOSIS OF LARGE INTESTINE WITHOUT PERFORATION OR ABSCESS WITHOUT BLEEDING: Status: ACTIVE | Noted: 2024-03-13

## 2024-07-31 NOTE — H&P (VIEW-ONLY)
Subjective:       Patient ID: Juan Mayo is a 69 y.o. male.    PCP: Dr. Dalton Ang  Urology: Dr. Luis Worthy    1. Anemia--Since 2019    Work-up:  2/2020--retic 1.4.  2/2021--iron saturation 22%, ferritin 260, vitamin B12 678.  3/2021--LDH normal, Haptoglobin normal, retic 1.9 (low-normal), folic acid normal, iron saturation 25%, ferritin 200, Radha negative, TSH normal 0.81, peripheral smear with normocytic anemia w/o anisocytosis.  6/2021--SPEP no M-spike, MAY negative, RF normal.    H/H:  02/2019--13.5/41.5  02/2020--12.2/37.7  02/2021--11.5/35.5  03/2021--11.4/34.9  05/2021--12.0/36.5  01/2022--11.2/34.1  06/2022--10.6/32.7  10/2022--11.4/36.7  02/2023--12.3/38.3  01/2024--12.7/38.4    2. H/o Prostate Cancer Stage IIA ([id number])--Diagnosed 2018  Biopsy/pathology:  Prostate biopsy 5/2018--10/12 cores positive for adenocarcinoma, [place] 7.  Imaging:  NM Bone Scan 6/6/18 at [facility]--small focus of increased activity overlies posterior right 10th rib, more intense focus of asymmetric activity right of midline T1 segment laterally, most typical degenerative origin, no findings elsewhere.  Prostate MRI w/ and w/o contrast [facility] 6/6/18--biopsy-proven high volume disease largely obscured by extensive biopsy-related hemorrhage, highest volume of disease right apex, no gross extracapsular disease, no seminal vesicle invasion or neurovascular bundle involvement, no skeletal or ranjeet metastases.  PET/CT on 7/19/24--Low level uptake within prominent pelvic lymph nodes suspicious for metastatic disease. Activity above baseline involving the cervical, thoracic and lumbar spine as well as the right ischial tuberosity compatible with osseous metastatic disease. Innumerable photopenic, hypodense hepatic lesions suspicious for metastatic disease. Correlate with MR.  Pulmonary emphysema. Emphysema on CT is an independent risk factor for lung cancer. Low-dose cancer screening to be considered in the future, if patient does not  already enrolled in such a program.     PSA:  1/2021--0.09, testosterone 199 (normal 343-1275).    Treatment history:  1. Prostate radiation 9/25/18--11/27/18.  2. Lupron/ADT X 1 year--completed in 7/2019.     Chief Complaint: Results (No new concerns today. )    HPI   Patient present for telephone follow-up of anemia. He is doing well. 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 Recent labs show stable renal insufficiency and he does admit that he has decreased his water intake. Labs show stable, mild anemia with Hgb of 12.3 g/dL. Iron level and ferritin normal. His folic acid is normal and he continues on the folic acid daily. He has no new complaints.     However, when reviewing his chart, I noticed a recent PET/CT done on 7/19/24 that revealed uptake in prominent pelvic lymph nodes, suspicious for metastatic disease. Activity above baseline involving the cervical, thoracic and lumbar spine as well as the right ischial tuberosity compatible with osseous metastatic disease. Innumerable photopenic, hypodense hepatic lesions suspicious for metastatic disease. I had our medical records get the most recent note from Dr. Worthy which was in June. He had a PSA drawn on 7/15/24 that was elevated at 29.45. When I asked patient about his scan results and the plan of care, he mentioned that Dr. Worthy was going to refer him to Dr. Tobias but he has not heard back about an appointment. I contacted Dr. Tobias's office and he is not scheduled until 8/27/24. We will see about getting him in sooner.     Past Medical History:   Diagnosis Date    Bilateral carotid artery stenosis     Essential (primary) hypertension     Fatigue     Urinary retention     Vitamin D deficiency       Review of patient's allergies indicates:  No Known Allergies   Current Outpatient Medications on File Prior to Visit   Medication Sig Dispense Refill    aspirin 81 mg Cap Take 81 mg by mouth.      atorvastatin (LIPITOR) 20 MG tablet Take 1  tablet (20 mg total) by mouth every evening. 30 tablet 11    cholecalciferol, vitamin D3, (VITAMIN D3) 25 mcg (1,000 unit) capsule Take 25 mcg by mouth.      fenofibrate 160 MG Tab Take 1 tablet (160 mg total) by mouth once daily. 30 tablet 11    multivitamin (MULTIPLE VITAMIN ORAL) Take 1 capsule by mouth.      NIFEdipine (PROCARDIA-XL) 30 MG (OSM) 24 hr tablet Take 1 tablet (30 mg total) by mouth every morning. 30 tablet 11    ramipriL (ALTACE) 5 MG capsule Take 1 tablet by mouth daily. 30 capsule 11    tamsulosin (FLOMAX) 0.4 mg Cap Take 0.4 mg by mouth once daily.      [DISCONTINUED] folic acid (FOLVITE) 1 MG tablet Take 1 tablet (1 mg total) by mouth once daily. 90 tablet 3     No current facility-administered medications on file prior to visit.      Review of Systems   Constitutional:  Negative for activity change, fever and unexpected weight change.   Eyes:  Negative for visual disturbance.   Respiratory:  Negative for cough and shortness of breath.    Cardiovascular:  Negative for chest pain.   Gastrointestinal:  Negative for abdominal pain, blood in stool, constipation, diarrhea, nausea and vomiting.   Genitourinary:  Positive for frequency and urgency. Negative for difficulty urinating.   Musculoskeletal:  Negative for back pain.   Integumentary:  Negative for rash.   Neurological:  Negative for dizziness, weakness and headaches.   Psychiatric/Behavioral:  Negative for behavioral problems and suicidal ideas.          Outside labs on 8/1/24:  CBC with WBC 4.2, H/H 12.3/37.3, Plts 186, ANC 3000. CMP with BUN 24, creat 1.37 otherwise good. Iron 50, ferritin 273, iron sat 13%, folate 16.1, Vitamin B12 913.   Assessment:       Problem List Items Addressed This Visit          Renal/    Chronic kidney disease    Overview     Last Assessment & Plan: Formatting of this note might be different from the original. Avoid NSAIDs and CARTER II inhibitors including Ibuprofen, Motrin, Advil, Aleve, Naproxen, Celebrex,  Diclofenac and Meloxicam. Avoid contrast imaging and other nephrotoxic agents. Encourage adequate fluid intake Current creatinine 1.33. Last Assessment & Plan:  Formatting of this note might be different from the original.  Renal indices continue to improved now off lisinopril.  Avoid NSAIDs and CARTER II inhibitors including Ibuprofen, Motrin, Advil, Aleve, Naproxen, Celebrex, Diclofenac and Meloxicam. Avoid contrast imaging and other nephrotoxic agents.    03/19/2024: Stable; followed by Dr. Oliva; obtain last progress note.         Elevated PSA       Oncology    Anemia in chronic kidney disease - Primary    Overview     Formatting of this note might be different from the original.  Last Assessment & Plan:   Formatting of this note might be different from the original.  Followed by Dr. Rhianna Zamora; labs ordered.    Last Assessment & Plan:   Formatting of this note might be different from the original.  Followed by Dr. Rhianna Zamora    Current hemoglobin and hematocrit 12.3 and 38.3.    03/19/2024:  Hemoglobin and hematocrit 2/14/2024: 11.7/36.1.  Followed by hematologist, Dr. Rhianna Zamora         Relevant Medications    folic acid (FOLVITE) 1 MG tablet    Prostate cancer    Overview     Stable; followed by Dr. Worthy.            Endocrine    Folic acid deficiency    Relevant Medications    folic acid (FOLVITE) 1 MG tablet       GI    Abnormal PET scan, liver       Orthopedic    Radiodense bone lesion present on x-ray     Other Visit Diagnoses       Anemia due to chronic kidney disease, unspecified CKD stage        Relevant Medications    folic acid (FOLVITE) 1 MG tablet               Plan:       Patient with mild anemia, remainder of CBCdiff normal.  Iron studies, folic acid, vitamin B12 levels, TSH normal. No evidence of hemolysis. Peripheral smear unremarkable aside from normocytic anemia.  Patient with a h/o prostate cancer diagnosed in 2018, treated with radiation and ADT X 1 year.  Last dose of Lupron was  7/2019.  PSA from 1/2021 good but testosterone remains low.  Suspected initially anemia may be lingering effect from Lupron and low testosterone.  Additional work-up for anemia to r/o other causes negative.    Recent labs show stable anemia with Hgb of 12.3 g/dL. Iron level normal at 50, ferritin at 273.   Folate level normal. Continue Folic acid 1mg daily.   His renal function is abnormal, evidence of renal insufficiency but it is stable. He admits to decreased water intake since he has been busy and works outside. Continues with close follow-up with Nephrology.     Patient with elevated PSA on 7/15/24.   PET/CT on 7/19/24--Low level uptake within prominent pelvic lymph nodes suspicious for metastatic disease. Activity above baseline involving the cervical, thoracic and lumbar spine as well as the right ischial tuberosity compatible with osseous metastatic disease. Innumerable photopenic, hypodense hepatic lesions suspicious for metastatic disease. Correlate with MR.  Pulmonary emphysema. Emphysema on CT is an independent risk factor for lung cancer. Low-dose cancer screening to be considered in the future, if patient does not already enrolled in such a program.   When I asked patient about his scan results and the plan of care, he mentioned that Dr. Worthy was going to refer him to Dr. Tobias but he has not heard back about an appointment. I contacted Dr. Tobias's office and he is not scheduled until 8/27/24. We will see about getting him in sooner. If not, then consider ordering MRI liver protocol and referring him for biopsy.     Will schedule a short-term follow-up in 3 months to stay updated. Dr. Zamora will be reaching out to Dr. Tobias.     All questions answered at this time.    This was a Telephone visit. The patient consented to the consult held via telephone. The phone call took place with myself and the patient only according to [facility] protocol. I, distant Nurse Practitioner, conducted the visit from [facility]. The patient participated in the visit at a non-[facility] location selected by the patient, identified at his place of employment. I am licensed in the state where the patient stated he was located. The patient stated that he understood and accepted the privacy and security risks to their information at their location. Total time spent on the phone was 10 minutes.       Alden Obando, [facility]         Addendum:   Dr. Tobias recommended we order the MRI of A/P (liver protocol) and obtain CT guided biopsy of liver lesions prior to the appointment. Dr. Zamora would like to see the patient after the MRI, CT biopsy and the appointment with Dr. Tobias. Patient called and notified of plan of care. He states understanding with no further questions at present. Mentions he has no insurance coverage at this time and has applied for medicare but has not heard back as of yet.

## 2024-08-08 ENCOUNTER — OFFICE VISIT (OUTPATIENT)
Dept: HEMATOLOGY/ONCOLOGY | Facility: CLINIC | Age: 69
End: 2024-08-08

## 2024-08-08 VITALS — BODY MASS INDEX: 28.79 KG/M2 | WEIGHT: 190 LBS | HEIGHT: 68 IN

## 2024-08-08 DIAGNOSIS — E53.8 FOLIC ACID DEFICIENCY: ICD-10-CM

## 2024-08-08 DIAGNOSIS — N18.31 ANEMIA IN STAGE 3A CHRONIC KIDNEY DISEASE: Primary | ICD-10-CM

## 2024-08-08 DIAGNOSIS — N18.2 STAGE 2 CHRONIC KIDNEY DISEASE: ICD-10-CM

## 2024-08-08 DIAGNOSIS — R97.20 ELEVATED PSA: ICD-10-CM

## 2024-08-08 DIAGNOSIS — D63.1 ANEMIA IN STAGE 3A CHRONIC KIDNEY DISEASE: Primary | ICD-10-CM

## 2024-08-08 DIAGNOSIS — R93.2 ABNORMAL PET SCAN, LIVER: ICD-10-CM

## 2024-08-08 DIAGNOSIS — C61 PROSTATE CANCER: ICD-10-CM

## 2024-08-08 DIAGNOSIS — N18.9 ANEMIA DUE TO CHRONIC KIDNEY DISEASE, UNSPECIFIED CKD STAGE: ICD-10-CM

## 2024-08-08 DIAGNOSIS — M89.9 RADIODENSE BONE LESION PRESENT ON X-RAY: ICD-10-CM

## 2024-08-08 DIAGNOSIS — D63.1 ANEMIA DUE TO CHRONIC KIDNEY DISEASE, UNSPECIFIED CKD STAGE: ICD-10-CM

## 2024-08-08 PROBLEM — R94.5 ABNORMAL PET SCAN, LIVER: Status: ACTIVE | Noted: 2024-08-08

## 2024-08-08 RX ORDER — TAMSULOSIN HYDROCHLORIDE 0.4 MG/1
0.4 CAPSULE ORAL DAILY
COMMUNITY
Start: 2024-07-17

## 2024-08-08 RX ORDER — FOLIC ACID 1 MG/1
1 TABLET ORAL DAILY
Qty: 90 TABLET | Refills: 3 | Status: SHIPPED | OUTPATIENT
Start: 2024-08-08

## 2024-08-09 ENCOUNTER — TELEPHONE (OUTPATIENT)
Dept: HEMATOLOGY/ONCOLOGY | Facility: CLINIC | Age: 69
End: 2024-08-09

## 2024-08-09 NOTE — TELEPHONE ENCOUNTER
I spoke to Juan about his insurance coverage. He confirmed he has no coverage right now. He and his wife are working on going to the Social Security Office to determine how long it will be before his benefits kick in. He will let me know as soon as they know.

## 2024-08-26 NOTE — PROGRESS NOTES
Subjective:       Patient ID: Juan Mayo is a 69 y.o. male.    PCP: Dr. Dalton Ang  Urology: Dr. Luis Worthy    1. 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  CT-guided liver biopsy done 24--metastatic carcinoma c/w prostate primary site.   Imagin. NM Bone Scan 18 at [facility]--small focus of increased activity overlies posterior right 10th rib, more intense focus of asymmetric activity right of midline T1 segment laterally, most typical degenerative origin, no findings elsewhere.  2. Prostate MRI w/ and w/o contrast [facility] 18--biopsy-proven high volume disease largely obscured by extensive biopsy-related hemorrhage, highest volume of disease right apex, no gross extracapsular disease, no seminal vesicle invasion or neurovascular bundle involvement, no skeletal or ranjeet metastases.  3. PSMA PET/CT on 24--Low level uptake within prominent pelvic lymph nodes suspicious for metastatic disease. Activity above baseline involving the cervical, thoracic and lumbar spine as well as the right ischial tuberosity compatible with osseous metastatic disease. Innumerable photopenic, hypodense hepatic lesions suspicious for metastatic disease. Correlate with MR.  Pulmonary emphysema. Emphysema on CT is an independent risk factor for lung cancer. Low-dose cancer screening to be considered in the future, if patient does not already enrolled in such a program.   4. MRI abdomen w/ and w/o contrast done 24--Numerous liver metastases noted.  Greater than 30 lesions seen, appear roughly larger and more numerous compared to prior scan in 2024, index lesion measured in the right posterolateral aspect of the right hepatic lobe measuring 3.8 cm, gallbladder and biliary ductal system normal, pancreas normal, possible periaortic lymph node on the left measuring 1.2 cm, numerous  metastatic liver lesions.  Lesions appear roughly larger and more numerous compared to a prior noncontrast CT exam from 07/19/2024, possible single lymph node metastasis left periaortic region.     2. Anemia--Since 2019    Genetic testing:  Eliezer done and negative.     Work-up:  2/2020--retic 1.4.  2/2021--iron saturation 22%, ferritin 260, vitamin B12 678.  3/2021--LDH normal, Haptoglobin normal, retic 1.9 (low-normal), folic acid normal, iron saturation 25%, ferritin 200, Radha negative, TSH normal 0.81, peripheral smear with normocytic anemia w/o anisocytosis.  6/2021--SPEP no M-spike, MAY negative, RF normal.    PSA:  1/2021--0.09, testosterone 199 (normal 343-1275).  8/27/24--117    Treatment history:  1. Prostate radiation 9/25/18--11/27/18.  2. Lupron/ADT X 1 year--completed in 7/2019.     Treatment plan:  Eligard 45mg subQ every 6 months started 8/27/24  Darolutamide 600mg PO BID to start soon    Chief Complaint: Other Misc (Pt reports no concerns today.)    HPI   Patient presents for an appointment for a new/recurrent cancer for which I have not seen him in the past. He was previously following with urology. He was noted to have elevated PSA and PSMA PET was done and showed metastatic liver lesions. MRI confirmed numerous lesions in liver c/w metastases and he underwent liver biopsy with findings c/w metastatic prostate cancer. He is here to establish care for recurrent prostate cancer and for evaluation for chemotherapy. He was started on Eligard last week with urology and Dr. Tobias's office has ordered Daralutamide which he has not received yet. He has no symptoms whatsoever. No weight loss, no abdominal pain and no other pain. His wife is with him today. I discussed incurable nature of disease and palliative goals of treatment.    Past Medical History:   Diagnosis Date    Anemia, unspecified     Bilateral carotid artery stenosis     Essential (primary) hypertension     Fatigue     Prostate cancer      Urinary retention     Vitamin D deficiency       Review of patient's allergies indicates:  No Known Allergies   Current Outpatient Medications on File Prior to Visit   Medication Sig Dispense Refill    aspirin 81 mg Cap Take 81 mg by mouth.      atorvastatin (LIPITOR) 20 MG tablet Take 1 tablet (20 mg total) by mouth every evening. 30 tablet 11    cholecalciferol, vitamin D3, (VITAMIN D3) 25 mcg (1,000 unit) capsule Take 25 mcg by mouth.      fenofibrate 160 MG Tab Take 1 tablet (160 mg total) by mouth once daily. (Patient taking differently: Take 160 mg by mouth nightly.) 30 tablet 11    folic acid (FOLVITE) 1 MG tablet Take 1 tablet (1 mg total) by mouth once daily. 90 tablet 3    multivitamin (MULTIPLE VITAMIN ORAL) Take 1 capsule by mouth once daily.      NIFEdipine (PROCARDIA-XL) 30 MG (OSM) 24 hr tablet Take 1 tablet (30 mg total) by mouth every morning. 30 tablet 11    ramipriL (ALTACE) 5 MG capsule Take 1 tablet by mouth daily. 30 capsule 11    tamsulosin (FLOMAX) 0.4 mg Cap Take 0.4 mg by mouth once daily.       No current facility-administered medications on file prior to visit.      Review of Systems   Constitutional:  Negative for activity change, fever and unexpected weight change.   Eyes:  Negative for visual disturbance.   Respiratory:  Negative for cough and shortness of breath.    Cardiovascular:  Negative for chest pain.   Gastrointestinal:  Negative for abdominal pain, blood in stool, constipation, diarrhea, nausea and vomiting.   Genitourinary:  Positive for frequency and urgency. Negative for difficulty urinating.   Musculoskeletal:  Negative for back pain.   Integumentary:  Negative for rash.   Neurological:  Negative for dizziness, weakness and headaches.   Psychiatric/Behavioral:  Negative for behavioral problems and suicidal ideas.      Wt Readings from Last 3 Encounters:   09/05/24 1509 87.3 kg (192 lb 6.4 oz)   08/30/24 1114 82.1 kg (181 lb)   08/08/24 1448 86.2 kg (190 lb)     Physical  Exam         Assessment:       1. Prostate cancer          Plan:       Patient was following with me for a mild anemia.  H/o Prostate cancer in 2018, diagnosed with recurrence 7/2024 liver and bone metastases seen on PSMA PET done for elevated PSA.    Patient underwent liver biopsy on 8/27/24, pathology c/w metastatic prostate cancer.    Started on Eligard 8/27/24 with urology Dr. Worthy and Darolutamide ordered, but patient has not received yet.  Per Dr. Tobias, genetic testing negative and liquid Guardant also done, will obtain results.  Dr. Pa also ordering CARIS for liver biopsy.    Recommend treatment with triplet therapy ADT with Docetaxel plus Darolutamide given multi-organ involvement/high volume metastases per [facility].   I have recommended to add Taxotere chemotherapy to his current regimen. Plan for 6 cycles, will start with 60mg/m2 given his age.    Plan to repeat PSMA PET after chemotherapy is done and will monitor PSA in between.    Schedule patient education.  I discussed the most common and more rare potential side effects of chemotherapy including fatigue, N/V/D/C, and low blood counts. Patient was given information from [employer] on Taxotere.    Refer to Dr. Proctor for mediport placement.  Plan for labs and treatment C1 to start in 2 weeks on 9/19/24.    F/u in 3 weeks labs and toxicity check.     Discussed with patient and his wife incurable nature of is cancer and palliative goals of treatment.         All questions answered at this time.    Rhianna Zamora MD

## 2024-08-27 ENCOUNTER — LAB REQUISITION (OUTPATIENT)
Dept: LAB | Facility: HOSPITAL | Age: 69
End: 2024-08-27
Payer: MEDICARE

## 2024-08-27 DIAGNOSIS — C61 MALIGNANT NEOPLASM OF PROSTATE: ICD-10-CM

## 2024-08-27 LAB
ALBUMIN SERPL-MCNC: 4 G/DL (ref 3.4–4.8)
ALBUMIN/GLOB SERPL: 1.4 RATIO (ref 1.1–2)
ALP SERPL-CCNC: 80 UNIT/L (ref 40–150)
ALT SERPL-CCNC: 36 UNIT/L (ref 0–55)
ANION GAP SERPL CALC-SCNC: 8 MEQ/L
AST SERPL-CCNC: 44 UNIT/L (ref 5–34)
BASOPHILS # BLD AUTO: 0.03 X10(3)/MCL
BASOPHILS NFR BLD AUTO: 0.7 %
BILIRUB SERPL-MCNC: 0.4 MG/DL
BUN SERPL-MCNC: 20.8 MG/DL (ref 8.4–25.7)
CALCIUM SERPL-MCNC: 9.7 MG/DL (ref 8.8–10)
CHLORIDE SERPL-SCNC: 110 MMOL/L (ref 98–107)
CO2 SERPL-SCNC: 22 MMOL/L (ref 23–31)
CREAT SERPL-MCNC: 1.38 MG/DL (ref 0.73–1.18)
CREAT/UREA NIT SERPL: 15
EOSINOPHIL # BLD AUTO: 0.07 X10(3)/MCL (ref 0–0.9)
EOSINOPHIL NFR BLD AUTO: 1.6 %
ERYTHROCYTE [DISTWIDTH] IN BLOOD BY AUTOMATED COUNT: 13.1 % (ref 11.5–17)
GFR SERPLBLD CREATININE-BSD FMLA CKD-EPI: 55 ML/MIN/1.73/M2
GLOBULIN SER-MCNC: 2.9 GM/DL (ref 2.4–3.5)
GLUCOSE SERPL-MCNC: 109 MG/DL (ref 82–115)
HCT VFR BLD AUTO: 36.9 % (ref 42–52)
HGB BLD-MCNC: 12.2 G/DL (ref 14–18)
IMM GRANULOCYTES # BLD AUTO: 0.02 X10(3)/MCL (ref 0–0.04)
IMM GRANULOCYTES NFR BLD AUTO: 0.5 %
LYMPHOCYTES # BLD AUTO: 0.72 X10(3)/MCL (ref 0.6–4.6)
LYMPHOCYTES NFR BLD AUTO: 16.7 %
MCH RBC QN AUTO: 30.3 PG (ref 27–31)
MCHC RBC AUTO-ENTMCNC: 33.1 G/DL (ref 33–36)
MCV RBC AUTO: 91.8 FL (ref 80–94)
MONOCYTES # BLD AUTO: 0.43 X10(3)/MCL (ref 0.1–1.3)
MONOCYTES NFR BLD AUTO: 10 %
NEUTROPHILS # BLD AUTO: 3.03 X10(3)/MCL (ref 2.1–9.2)
NEUTROPHILS NFR BLD AUTO: 70.5 %
NRBC BLD AUTO-RTO: 0 %
PLATELET # BLD AUTO: 172 X10(3)/MCL (ref 130–400)
PMV BLD AUTO: 11 FL (ref 7.4–10.4)
POTASSIUM SERPL-SCNC: 4.3 MMOL/L (ref 3.5–5.1)
PROT SERPL-MCNC: 6.9 GM/DL (ref 5.8–7.6)
PSA SERPL-MCNC: 113.73 NG/ML
RBC # BLD AUTO: 4.02 X10(6)/MCL (ref 4.7–6.1)
SODIUM SERPL-SCNC: 140 MMOL/L (ref 136–145)
TESTOST SERPL-MCNC: 407.59 NG/DL (ref 220.91–715.81)
WBC # BLD AUTO: 4.3 X10(3)/MCL (ref 4.5–11.5)

## 2024-08-27 PROCEDURE — 85025 COMPLETE CBC W/AUTO DIFF WBC: CPT

## 2024-08-27 PROCEDURE — 80053 COMPREHEN METABOLIC PANEL: CPT

## 2024-08-27 PROCEDURE — 84403 ASSAY OF TOTAL TESTOSTERONE: CPT

## 2024-08-27 PROCEDURE — 84153 ASSAY OF PSA TOTAL: CPT

## 2024-08-29 ENCOUNTER — APPOINTMENT (OUTPATIENT)
Dept: RADIOLOGY | Facility: HOSPITAL | Age: 69
End: 2024-08-29
Attending: NURSE PRACTITIONER
Payer: MEDICARE

## 2024-08-29 DIAGNOSIS — M89.9 RADIODENSE BONE LESION PRESENT ON X-RAY: ICD-10-CM

## 2024-08-29 DIAGNOSIS — R93.2 ABNORMAL PET SCAN, LIVER: ICD-10-CM

## 2024-08-29 DIAGNOSIS — C61 PROSTATE CANCER: ICD-10-CM

## 2024-08-29 DIAGNOSIS — R97.20 ELEVATED PSA: ICD-10-CM

## 2024-08-29 PROCEDURE — 74183 MRI ABD W/O CNTR FLWD CNTR: CPT | Mod: TC

## 2024-08-29 PROCEDURE — 25500020 PHARM REV CODE 255: Performed by: NURSE PRACTITIONER

## 2024-08-29 PROCEDURE — A9577 INJ MULTIHANCE: HCPCS | Performed by: NURSE PRACTITIONER

## 2024-08-29 RX ADMIN — GADOBENATE DIMEGLUMINE 17 ML: 529 INJECTION, SOLUTION INTRAVENOUS at 11:08

## 2024-08-30 ENCOUNTER — HOSPITAL ENCOUNTER (OUTPATIENT)
Dept: RADIOLOGY | Facility: HOSPITAL | Age: 69
Discharge: HOME | End: 2024-08-30
Attending: NURSE PRACTITIONER
Payer: MEDICARE

## 2024-08-30 VITALS
TEMPERATURE: 98 F | OXYGEN SATURATION: 97 % | HEIGHT: 68 IN | WEIGHT: 181 LBS | RESPIRATION RATE: 18 BRPM | DIASTOLIC BLOOD PRESSURE: 56 MMHG | BODY MASS INDEX: 27.43 KG/M2 | HEART RATE: 64 BPM | SYSTOLIC BLOOD PRESSURE: 126 MMHG

## 2024-08-30 DIAGNOSIS — C61 PROSTATE CANCER: ICD-10-CM

## 2024-08-30 DIAGNOSIS — R93.2 ABNORMAL PET SCAN, LIVER: ICD-10-CM

## 2024-08-30 DIAGNOSIS — M89.9 RADIODENSE BONE LESION PRESENT ON X-RAY: ICD-10-CM

## 2024-08-30 DIAGNOSIS — R97.20 ELEVATED PSA: ICD-10-CM

## 2024-08-30 LAB
ALBUMIN SERPL-MCNC: 3.9 G/DL (ref 3.4–4.8)
ALBUMIN/GLOB SERPL: 1.5 RATIO (ref 1.1–2)
ALP SERPL-CCNC: 84 UNIT/L (ref 40–150)
ALT SERPL-CCNC: 41 UNIT/L (ref 0–55)
ANION GAP SERPL CALC-SCNC: 9 MEQ/L
AST SERPL-CCNC: 43 UNIT/L (ref 5–34)
BASOPHILS # BLD AUTO: 0.03 X10(3)/MCL
BASOPHILS NFR BLD AUTO: 0.6 %
BILIRUB SERPL-MCNC: 0.5 MG/DL
BUN SERPL-MCNC: 21.2 MG/DL (ref 8.4–25.7)
CALCIUM SERPL-MCNC: 10 MG/DL (ref 8.8–10)
CHLORIDE SERPL-SCNC: 108 MMOL/L (ref 98–107)
CO2 SERPL-SCNC: 24 MMOL/L (ref 23–31)
CREAT SERPL-MCNC: 1.33 MG/DL (ref 0.73–1.18)
CREAT/UREA NIT SERPL: 16
EOSINOPHIL # BLD AUTO: 0.07 X10(3)/MCL (ref 0–0.9)
EOSINOPHIL NFR BLD AUTO: 1.5 %
ERYTHROCYTE [DISTWIDTH] IN BLOOD BY AUTOMATED COUNT: 13.1 % (ref 11.5–17)
GFR SERPLBLD CREATININE-BSD FMLA CKD-EPI: 57 ML/MIN/1.73/M2
GLOBULIN SER-MCNC: 2.6 GM/DL (ref 2.4–3.5)
GLUCOSE SERPL-MCNC: 80 MG/DL (ref 82–115)
HCT VFR BLD AUTO: 36.8 % (ref 42–52)
HGB BLD-MCNC: 12.3 G/DL (ref 14–18)
IMM GRANULOCYTES # BLD AUTO: 0.02 X10(3)/MCL (ref 0–0.04)
IMM GRANULOCYTES NFR BLD AUTO: 0.4 %
INR PPP: 1
LYMPHOCYTES # BLD AUTO: 0.67 X10(3)/MCL (ref 0.6–4.6)
LYMPHOCYTES NFR BLD AUTO: 14.4 %
MCH RBC QN AUTO: 30.4 PG (ref 27–31)
MCHC RBC AUTO-ENTMCNC: 33.4 G/DL (ref 33–36)
MCV RBC AUTO: 91.1 FL (ref 80–94)
MONOCYTES # BLD AUTO: 0.37 X10(3)/MCL (ref 0.1–1.3)
MONOCYTES NFR BLD AUTO: 8 %
NEUTROPHILS # BLD AUTO: 3.48 X10(3)/MCL (ref 2.1–9.2)
NEUTROPHILS NFR BLD AUTO: 75.1 %
NRBC BLD AUTO-RTO: 0 %
PLATELET # BLD AUTO: 159 X10(3)/MCL (ref 130–400)
PMV BLD AUTO: 10.3 FL (ref 7.4–10.4)
POTASSIUM SERPL-SCNC: 4.3 MMOL/L (ref 3.5–5.1)
PROT SERPL-MCNC: 6.5 GM/DL (ref 5.8–7.6)
PROTHROMBIN TIME: 13.1 SECONDS (ref 12.5–14.5)
RBC # BLD AUTO: 4.04 X10(6)/MCL (ref 4.7–6.1)
SODIUM SERPL-SCNC: 141 MMOL/L (ref 136–145)
WBC # BLD AUTO: 4.64 X10(3)/MCL (ref 4.5–11.5)

## 2024-08-30 PROCEDURE — 85610 PROTHROMBIN TIME: CPT | Performed by: RADIOLOGY

## 2024-08-30 PROCEDURE — 80053 COMPREHEN METABOLIC PANEL: CPT | Performed by: RADIOLOGY

## 2024-08-30 PROCEDURE — 63600175 PHARM REV CODE 636 W HCPCS: Performed by: RADIOLOGY

## 2024-08-30 PROCEDURE — 77012 CT SCAN FOR NEEDLE BIOPSY: CPT | Mod: TC

## 2024-08-30 PROCEDURE — 85025 COMPLETE CBC W/AUTO DIFF WBC: CPT | Performed by: RADIOLOGY

## 2024-08-30 PROCEDURE — 99152 MOD SED SAME PHYS/QHP 5/>YRS: CPT

## 2024-08-30 PROCEDURE — 36415 COLL VENOUS BLD VENIPUNCTURE: CPT | Performed by: RADIOLOGY

## 2024-08-30 PROCEDURE — 25000003 PHARM REV CODE 250: Performed by: RADIOLOGY

## 2024-08-30 RX ORDER — LIDOCAINE HYDROCHLORIDE 20 MG/ML
INJECTION, SOLUTION INFILTRATION; PERINEURAL
Status: COMPLETED | OUTPATIENT
Start: 2024-08-30 | End: 2024-08-30

## 2024-08-30 RX ORDER — MIDAZOLAM HYDROCHLORIDE 2 MG/2ML
INJECTION, SOLUTION INTRAMUSCULAR; INTRAVENOUS
Status: COMPLETED | OUTPATIENT
Start: 2024-08-30 | End: 2024-08-30

## 2024-08-30 RX ORDER — MIDAZOLAM HYDROCHLORIDE 2 MG/2ML
INJECTION, SOLUTION INTRAMUSCULAR; INTRAVENOUS
Status: DISCONTINUED
Start: 2024-08-30 | End: 2024-08-31 | Stop reason: HOSPADM

## 2024-08-30 RX ORDER — FENTANYL CITRATE 50 UG/ML
INJECTION, SOLUTION INTRAMUSCULAR; INTRAVENOUS
Status: DISCONTINUED
Start: 2024-08-30 | End: 2024-08-31 | Stop reason: HOSPADM

## 2024-08-30 RX ORDER — FENTANYL CITRATE 50 UG/ML
INJECTION, SOLUTION INTRAMUSCULAR; INTRAVENOUS
Status: COMPLETED | OUTPATIENT
Start: 2024-08-30 | End: 2024-08-30

## 2024-08-30 RX ADMIN — FENTANYL CITRATE 50 MCG: 50 INJECTION, SOLUTION INTRAMUSCULAR; INTRAVENOUS at 03:08

## 2024-08-30 RX ADMIN — MIDAZOLAM HYDROCHLORIDE 1 MG: 1 INJECTION, SOLUTION INTRAMUSCULAR; INTRAVENOUS at 03:08

## 2024-08-30 RX ADMIN — LIDOCAINE HYDROCHLORIDE 5 ML: 20 INJECTION, SOLUTION INFILTRATION; PERINEURAL at 03:08

## 2024-08-30 NOTE — DISCHARGE INSTRUCTIONS
REMOVE BANDAID TOMORROW, THEN SHOWER  NO TUB BATHS FOR 5 DAYS  NO OINTMENT, CREAMS, POWDERS, ALCOHOL, PEROXIDE ON SITE

## 2024-08-30 NOTE — INTERVAL H&P NOTE
The patient has been examined and the H&P has been reviewed:    I concur with the findings and no changes have occurred since H&P was written. 70 yo M with         There are no hospital problems to display for this patient.

## 2024-08-30 NOTE — INTERVAL H&P NOTE
The patient has been examined and the H&P has been reviewed:    I concur with the findings and no changes have occurred since H&P was written. 68 yo M with h/o prostate cancer and liver mass presents for liver mass biopsy.        There are no hospital problems to display for this patient.

## 2024-08-30 NOTE — DISCHARGE SUMMARY
Radiology Post-Procedure Note    Pre Op Diagnosis: Prostate Cancer with liver Masses    Post Op Diagnosis: Same    Secondary Diagnoses:   Problem List Items Addressed This Visit          Renal/    Elevated PSA    Relevant Orders    CT Biopsy Liver (xpd)       Oncology    Prostate cancer    Relevant Orders    CT Biopsy Liver (xpd)       GI    Abnormal PET scan, liver    Relevant Orders    CT Biopsy Liver (xpd)       Orthopedic    Radiodense bone lesion present on x-ray    Relevant Orders    CT Biopsy Liver (xpd)        Procedure: CT Guided Liver Mass Biopsy    Procedure performed by: Jamal Michelle MD    Assistant: None    Written Informed Consent Obtained: Yes    Specimen Removed: 18g core x 4    Estimated Blood Loss: <10 cc    Condition: Stable    Outcome: Patient tolerated treatment/procedure well without complication and is now ready for discharge.    Disposition: Home or Self Care    Follow Up: With primary care provider    Discharge Instructions:  No discharge procedures on file.       Time Spent On Discharge: 2 minutes

## 2024-09-04 LAB — PSYCHE PATHOLOGY RESULT: NORMAL

## 2024-09-05 ENCOUNTER — OFFICE VISIT (OUTPATIENT)
Dept: HEMATOLOGY/ONCOLOGY | Facility: CLINIC | Age: 69
End: 2024-09-05
Payer: MEDICARE

## 2024-09-05 VITALS
BODY MASS INDEX: 29.16 KG/M2 | HEIGHT: 68 IN | WEIGHT: 192.38 LBS | DIASTOLIC BLOOD PRESSURE: 64 MMHG | RESPIRATION RATE: 14 BRPM | OXYGEN SATURATION: 95 % | TEMPERATURE: 98 F | SYSTOLIC BLOOD PRESSURE: 142 MMHG | HEART RATE: 67 BPM

## 2024-09-05 DIAGNOSIS — C61 PROSTATE CANCER: Primary | ICD-10-CM

## 2024-09-05 PROCEDURE — 99999 PR PBB SHADOW E&M-EST. PATIENT-LVL V: CPT | Mod: PBBFAC,,, | Performed by: INTERNAL MEDICINE

## 2024-09-05 PROCEDURE — 99215 OFFICE O/P EST HI 40 MIN: CPT | Mod: PBBFAC | Performed by: INTERNAL MEDICINE

## 2024-09-06 ENCOUNTER — TELEPHONE (OUTPATIENT)
Dept: VASCULAR SURGERY | Facility: CLINIC | Age: 69
End: 2024-09-06
Payer: MEDICARE

## 2024-09-06 NOTE — TELEPHONE ENCOUNTER
Received referral for mediport insertion with Dr. Proctor. When contacting patient to schedule he confirmed that he is an established patient of Dr. Gutierrez's. I offered for patient to follow up with Dr. Gutierrez for mediport insertion or Dr. Proctor. He reports that he would like to have referral sent to Dr. Gutierrez. I communicated this with Dr. Zamora' nurse Shashank. Communicated to patient that we are here if needed.

## 2024-09-17 ENCOUNTER — LAB VISIT (OUTPATIENT)
Dept: LAB | Facility: HOSPITAL | Age: 69
End: 2024-09-17
Attending: INTERNAL MEDICINE
Payer: MEDICARE

## 2024-09-17 ENCOUNTER — CLINICAL SUPPORT (OUTPATIENT)
Dept: HEMATOLOGY/ONCOLOGY | Facility: CLINIC | Age: 69
End: 2024-09-17
Payer: MEDICARE

## 2024-09-17 ENCOUNTER — OFFICE VISIT (OUTPATIENT)
Dept: HEMATOLOGY/ONCOLOGY | Facility: CLINIC | Age: 69
End: 2024-09-17
Payer: MEDICARE

## 2024-09-17 DIAGNOSIS — C61 PROSTATE CANCER: Primary | ICD-10-CM

## 2024-09-17 DIAGNOSIS — C61 PROSTATE CANCER: ICD-10-CM

## 2024-09-17 LAB
ALBUMIN SERPL-MCNC: 3.9 G/DL (ref 3.4–4.8)
ALBUMIN/GLOB SERPL: 1.3 RATIO (ref 1.1–2)
ALP SERPL-CCNC: 128 UNIT/L (ref 40–150)
ALT SERPL-CCNC: 58 UNIT/L (ref 0–55)
ANION GAP SERPL CALC-SCNC: 9 MEQ/L
AST SERPL-CCNC: 57 UNIT/L (ref 5–34)
BASOPHILS # BLD AUTO: 0.05 X10(3)/MCL
BASOPHILS NFR BLD AUTO: 1.1 %
BILIRUB SERPL-MCNC: 0.5 MG/DL
BUN SERPL-MCNC: 29.2 MG/DL (ref 8.4–25.7)
CALCIUM SERPL-MCNC: 9.9 MG/DL (ref 8.8–10)
CHLORIDE SERPL-SCNC: 113 MMOL/L (ref 98–107)
CO2 SERPL-SCNC: 22 MMOL/L (ref 23–31)
CREAT SERPL-MCNC: 1.32 MG/DL (ref 0.73–1.18)
CREAT/UREA NIT SERPL: 22
EOSINOPHIL # BLD AUTO: 0.1 X10(3)/MCL (ref 0–0.9)
EOSINOPHIL NFR BLD AUTO: 2.2 %
ERYTHROCYTE [DISTWIDTH] IN BLOOD BY AUTOMATED COUNT: 12.4 % (ref 11.5–17)
GFR SERPLBLD CREATININE-BSD FMLA CKD-EPI: 58 ML/MIN/1.73/M2
GLOBULIN SER-MCNC: 3.1 GM/DL (ref 2.4–3.5)
GLUCOSE SERPL-MCNC: 100 MG/DL (ref 82–115)
HCT VFR BLD AUTO: 37.7 % (ref 42–52)
HGB BLD-MCNC: 12.6 G/DL (ref 14–18)
IMM GRANULOCYTES # BLD AUTO: 0.01 X10(3)/MCL (ref 0–0.04)
IMM GRANULOCYTES NFR BLD AUTO: 0.2 %
LYMPHOCYTES # BLD AUTO: 0.77 X10(3)/MCL (ref 0.6–4.6)
LYMPHOCYTES NFR BLD AUTO: 17.2 %
MCH RBC QN AUTO: 30.6 PG (ref 27–31)
MCHC RBC AUTO-ENTMCNC: 33.4 G/DL (ref 33–36)
MCV RBC AUTO: 91.5 FL (ref 80–94)
MONOCYTES # BLD AUTO: 0.44 X10(3)/MCL (ref 0.1–1.3)
MONOCYTES NFR BLD AUTO: 9.8 %
NEUTROPHILS # BLD AUTO: 3.1 X10(3)/MCL (ref 2.1–9.2)
NEUTROPHILS NFR BLD AUTO: 69.5 %
PLATELET # BLD AUTO: 208 X10(3)/MCL (ref 130–400)
PMV BLD AUTO: 10 FL (ref 7.4–10.4)
POTASSIUM SERPL-SCNC: 4.6 MMOL/L (ref 3.5–5.1)
PROT SERPL-MCNC: 7 GM/DL (ref 5.8–7.6)
PSA SERPL-MCNC: 30.63 NG/ML
RBC # BLD AUTO: 4.12 X10(6)/MCL (ref 4.7–6.1)
SODIUM SERPL-SCNC: 144 MMOL/L (ref 136–145)
WBC # BLD AUTO: 4.47 X10(3)/MCL (ref 4.5–11.5)

## 2024-09-17 PROCEDURE — 36415 COLL VENOUS BLD VENIPUNCTURE: CPT

## 2024-09-17 PROCEDURE — 84153 ASSAY OF PSA TOTAL: CPT

## 2024-09-17 PROCEDURE — 99215 OFFICE O/P EST HI 40 MIN: CPT | Mod: S$PBB,,,

## 2024-09-17 PROCEDURE — 99213 OFFICE O/P EST LOW 20 MIN: CPT | Mod: PBBFAC

## 2024-09-17 PROCEDURE — 85025 COMPLETE CBC W/AUTO DIFF WBC: CPT

## 2024-09-17 PROCEDURE — 80053 COMPREHEN METABOLIC PANEL: CPT

## 2024-09-17 PROCEDURE — 99999 PR PBB SHADOW E&M-EST. PATIENT-LVL III: CPT | Mod: PBBFAC,,,

## 2024-09-17 RX ORDER — ONDANSETRON 8 MG/1
8 TABLET, ORALLY DISINTEGRATING ORAL EVERY 8 HOURS PRN
Qty: 30 TABLET | Refills: 1 | Status: SHIPPED | OUTPATIENT
Start: 2024-09-17

## 2024-09-17 NOTE — PROGRESS NOTES
Oncology Nutrition Evaluation      Juan Mayo   1955    Oncology Provider:   Rhianna Zamora MD    Reason for Visit:  New Treatment Education    Oncology/Hematology Diagnosis:   1. Recurrence Stage IV--7/2024  H/o Prostate Cancer Stage IIA ([id number])--Diagnosed 2018    Treatment Plan:  Docetaxel +Eligard + darolutamide    Treatment History:  Treatment history:  1. Prostate radiation 9/25/18--11/27/18.  2. Lupron/ADT X 1 year--completed in 7/2019.    Nutrition Recommendations:  1. Regular diet as tolerated    Nutrition Assessment    9/17/24: This is a 69 y.o.male with a medical diagnosis of recurrent prostate CA. He reports good appetite and po intake. No c/o n/v/c/d. Wt has been stable. He tolerates a regular diet at home.    Nutrition Factors Affecting Intake  none identified    PMHx: HTN    Allergies: Patient has no known allergies.    Current Medications:    Current Outpatient Medications:     aspirin 81 mg Cap, Take 81 mg by mouth., Disp: , Rfl:     atorvastatin (LIPITOR) 20 MG tablet, Take 1 tablet (20 mg total) by mouth every evening., Disp: 30 tablet, Rfl: 11    cholecalciferol, vitamin D3, (VITAMIN D3) 25 mcg (1,000 unit) capsule, Take 25 mcg by mouth., Disp: , Rfl:     fenofibrate 160 MG Tab, Take 1 tablet (160 mg total) by mouth once daily. (Patient taking differently: Take 160 mg by mouth nightly.), Disp: 30 tablet, Rfl: 11    folic acid (FOLVITE) 1 MG tablet, Take 1 tablet (1 mg total) by mouth once daily., Disp: 90 tablet, Rfl: 3    multivitamin (MULTIPLE VITAMIN ORAL), Take 1 capsule by mouth once daily., Disp: , Rfl:     NIFEdipine (PROCARDIA-XL) 30 MG (OSM) 24 hr tablet, Take 1 tablet (30 mg total) by mouth every morning., Disp: 30 tablet, Rfl: 11    ondansetron (ZOFRAN-ODT) 8 MG TbDL, Take 1 tablet (8 mg total) by mouth every 8 (eight) hours as needed (nausea)., Disp: 30 tablet, Rfl: 1    ramipriL (ALTACE) 5 MG capsule, Take 1 tablet by mouth daily., Disp: 30 capsule, Rfl: 11    tamsulosin  "(FLOMAX) 0.4 mg Cap, Take 0.4 mg by mouth once daily., Disp: , Rfl:     Labs: no new    Anthropometrics    Height:   Ht Readings from Last 1 Encounters:   09/05/24 5' 8" (1.727 m)      Weight:   Wt Readings from Last 1 Encounters:   09/05/24 87.3 kg (192 lb 6.4 oz)        Usual Body Weight: 87.3 kg (192 lb)  % Weight Change: 0    BMI: 29.2 (overweight)    Ideal Weight: 70 kg (154 lb)  % Ideal Weight: 125%      Nutrition Diagnosis    PES: Food and nutrition related knowledge deficit related to chronic illness as evidenced by lack of prior exposure to oncology nutrition. (resolved)     Nutrition Risk  low    Nutrition Intervention    Interventions(treatment strategy):  general/healthful diet and purpose of nutrition education    Education Provided: food safety guidelines and general healthy diet  Teaching Method: explanation and printed materials  Comprehension: verbalizes understanding  Barriers to Learning: none evident  Expected Compliance: good  Comments: All questions were answered and dietitian's contact information was provided.      Nutrition Monitoring and Evaluation    Ongoing monitoring not warranted at this time. Please consult CHARLES gutierrezn.        Janet Henson, MS, RD, , LDN                                                                                                                                                                                                                                                                                  "

## 2024-09-17 NOTE — PROGRESS NOTES
THERAPY EDUCATION: DOCETAXEL Q3W  Subjective:      Patient ID: Juan Mayo is a 69 y.o. male.      1. Recurrence Stage IV--2024  H/o Prostate Cancer Stage IIA ([id number])--Diagnosed   Biopsy/pathology:  Prostate biopsy 2018--10/12 cores positive for adenocarcinoma, Tej 7.  CT-guided liver biopsy done 24--metastatic carcinoma c/w prostate primary site.   Imagin. NM Bone Scan 18 at [facility]--small focus of increased activity overlies posterior right 10th rib, more intense focus of asymmetric activity right of midline T1 segment laterally, most typical degenerative origin, no findings elsewhere.  2. Prostate MRI w/ and w/o contrast OLOL 18--biopsy-proven high volume disease largely obscured by extensive biopsy-related hemorrhage, highest volume of disease right apex, no gross extracapsular disease, no seminal vesicle invasion or neurovascular bundle involvement, no skeletal or ranjeet metastases.  3. PSMA PET/CT on 24--Low level uptake within prominent pelvic lymph nodes suspicious for metastatic disease. Activity above baseline involving the cervical, thoracic and lumbar spine as well as the right ischial tuberosity compatible with osseous metastatic disease. Innumerable photopenic, hypodense hepatic lesions suspicious for metastatic disease. Correlate with MR.  Pulmonary emphysema. Emphysema on CT is an independent risk factor for lung cancer. Low-dose cancer screening to be considered in the future, if patient does not already enrolled in such a program.   4. MRI abdomen w/ and w/o contrast done 24--Numerous liver metastases noted.  Greater than 30 lesions seen, appear roughly larger and more numerous compared to prior scan in 2024, index lesion measured in the right posterolateral aspect of the right hepatic lobe measuring 3.8 cm, gallbladder and biliary ductal system normal, pancreas normal, possible periaortic lymph node on the left measuring 1.2 cm, numerous metastatic liver  lesions.  Lesions appear roughly larger and more numerous compared to a prior noncontrast CT exam from 07/19/2024, possible single lymph node metastasis left periaortic region.     2. Anemia--Since 2019    Genetic testing:  Eliezer done and negative.     Work-up:  2/2020--retic 1.4.  2/2021--iron saturation 22%, ferritin 260, vitamin B12 678.  3/2021--LDH normal, Haptoglobin normal, retic 1.9 (low-normal), folic acid normal, iron saturation 25%, ferritin 200, Radha negative, TSH normal 0.81, peripheral smear with normocytic anemia w/o anisocytosis.  6/2021--SPEP no M-spike, MAY negative, RF normal.    PSA:  1/2021--0.09, testosterone 199 (normal 343-1275).  8/27/24--117    Treatment history:  1. Prostate radiation 9/25/18--11/27/18.  2. Lupron/ADT X 1 year--completed in 7/2019.     Treatment plan:  Eligard 45mg subQ every 6 months started 8/27/24  Darolutamide 600mg PO BID to start soon  Docetaxel Q3W to start on 9/19/24    Chief Complaint: Therapy Education     Interval History     9/17/24: Mr. Mayo presents with his sister to the clinic for therapy education. Patient reports no major complaints at today's visit. Denies fever, chills, SOB, N/V/D, constipation, recent infection, chest pain or unexplained bleeding or bruising.      9/5/24:Patient presents for an appointment for a new/recurrent cancer for which I have not seen him in the past. He was previously following with urology. He was noted to have elevated PSA and PSMA PET was done and showed metastatic liver lesions. MRI confirmed numerous lesions in liver c/w metastases and he underwent liver biopsy with findings c/w metastatic prostate cancer. He is here to establish care for recurrent prostate cancer and for evaluation for chemotherapy. He was started on Eligard last week with urology and Dr. Tobias's office has ordered Daralutamide which he has not received yet. He has no symptoms whatsoever. No weight loss, no abdominal pain and no other pain. His wife is  with him today. I discussed incurable nature of disease and palliative goals of treatment.    Past Medical History:   Diagnosis Date    Anemia, unspecified     Bilateral carotid artery stenosis     Essential (primary) hypertension     Fatigue     Prostate cancer     Urinary retention     Vitamin D deficiency       Review of patient's allergies indicates:  No Known Allergies   Current Outpatient Medications on File Prior to Visit   Medication Sig Dispense Refill    aspirin 81 mg Cap Take 81 mg by mouth.      atorvastatin (LIPITOR) 20 MG tablet Take 1 tablet (20 mg total) by mouth every evening. 30 tablet 11    cholecalciferol, vitamin D3, (VITAMIN D3) 25 mcg (1,000 unit) capsule Take 25 mcg by mouth.      fenofibrate 160 MG Tab Take 1 tablet (160 mg total) by mouth once daily. (Patient taking differently: Take 160 mg by mouth nightly.) 30 tablet 11    folic acid (FOLVITE) 1 MG tablet Take 1 tablet (1 mg total) by mouth once daily. 90 tablet 3    multivitamin (MULTIPLE VITAMIN ORAL) Take 1 capsule by mouth once daily.      NIFEdipine (PROCARDIA-XL) 30 MG (OSM) 24 hr tablet Take 1 tablet (30 mg total) by mouth every morning. 30 tablet 11    ramipriL (ALTACE) 5 MG capsule Take 1 tablet by mouth daily. 30 capsule 11    tamsulosin (FLOMAX) 0.4 mg Cap Take 0.4 mg by mouth once daily.       No current facility-administered medications on file prior to visit.      Review of Systems   Constitutional:  Negative for activity change, fever and unexpected weight change.   Eyes:  Negative for visual disturbance.   Respiratory:  Negative for cough and shortness of breath.    Cardiovascular:  Negative for chest pain.   Gastrointestinal:  Negative for abdominal pain, blood in stool, constipation, diarrhea, nausea and vomiting.   Genitourinary:  Positive for frequency and urgency. Negative for difficulty urinating.   Musculoskeletal:  Negative for back pain.   Integumentary:  Negative for rash.   Neurological:  Negative for dizziness,  weakness and headaches.   Psychiatric/Behavioral:  Negative for behavioral problems and suicidal ideas.      Wt Readings from Last 3 Encounters:   09/05/24 1509 87.3 kg (192 lb 6.4 oz)   08/30/24 1114 82.1 kg (181 lb)   08/08/24 1448 86.2 kg (190 lb)             Assessment:   1. Recurrence Stage IV--7/2024  H/o Prostate Cancer Stage IIA ([id number])--Diagnosed 2018  2. Anemia--Since 2019  Plan:   -Therapy education completed on 9/17/24.  -Plans to start Docetaxel Q3W to start on 9/19/24 in conjunction with Eligard 45mg subQ every 6 months and Darolutamide 600mg PO BID. Patient understands that he will receive premedications given 30 minutes prior to each treatment to help prevent nausea.  -Mediport placement awaiting to be scheduled.   -Zofran PRN prescribed for at home with instructions to be taken by mouth every 8 hours as needed for nausea. If not working, Compazine can be prescribed as 2nd choice.    -OTC Imodium AD recommended for diarrhea (4-5 BMs a day). Take 2 tablets after the first loose bowel movement, and 1 tablet after each loose bowel movement after the first dose has been taken. No more than 4 tablets should be taken in any 24-hour period. If not working, Lomotil can be prescribed as 2nd choice.    -Mouth sore prevention with 1-quart warm water with 1 tsp of baking soda and salt and alcohol-free mouthwash.   -Emphasized adequate hand-hygiene and limited contact with people who are sick.   -Monitor and notify any bleeding in urine, stool, or sputum.  As well as unusual bleeding or bruising and stomach pain.   - Emphasized hydration with 4 16 oz bottles of water a day.    -Importance of moisturizing with fragrance free lotion to prevent skin rash and/or hand-foot syndrome.  -Call clinic if fever >100.4, shakes or chills, shortness of breath, chest pain, uncontrolled vomiting or diarrhea, pain and tingling in the chest or arm, or just not feeling well.    -F/u ion 9/25/24 for same day labs and toxicity  check with MD.    DISCUSSION:    1.  A total of 60 minutes were spent in counseling today, in which 100% were face-to-face.  At today's therapy teaching session, we discussed the patient's cancer diagnosis as well as planned therapy regimen, protocol, side effects and toxicities.  A handout of each therapeutic agent in the regimen was provided and reviewed in detail.    2.  The following side effects were discussed but not limited to:    Docetaxel Side Effects:  Allergic Reactions  Breathing Problems  Bruising  Chills  Constipation  Cough  Diarrhea  Fever  Hair Loss  Infection  Itching  Low Blood Counts  Mouth Sores  Muscle Pain  Nail Changes  Nausea  Nosebleeds  Numbness  Pain  Rash  Swelling  Taste Changes  Tingling  Vomiting                a.  Discussed the risk of infection while on therapy related to pancytopenia, specifically a decrease in their white blood cell count.  Instructed to contact our office for temperature >100.4 F, chills, sudden onset cough or shortness of breath, symptoms of a urinary tract infection.                b.  Discussed the risk of anemia. Instructed to contact our office for dizziness, heart palpitations, or extreme or sudden changes in weakness.                c.  Discussed the risk of thrombocytopenia, which increases the risk of bruising or bleeding.  Instructed the patient to contact our office for spontaneous signs of bleeding, including nose bleeds, bleeding from the gums or mouth, blood in sputum, urine or stool and unusual or excessive bruising or rash.                d.  Discussed GI side effects including weight changes, changes in appetite, altered sense of taste, stomatitis, nausea, vomiting, diarrhea, constipation, and heartburn.                e.  Discussed  side effects including painful urination, changes in the amount of urination, possible urine color changes.  Discussed fertility issues and to prevent  pregnancy if of child bearing age.                f.   Discussed neurological side effects including the risk of peripheral neuropathy, either temporary or permanent.                g.  Discussed the potential for skin, hair, and nail changes.       3.  Instructed to contact our office for discussion of medication changes, the addition of vitamin and/or herbal supplementation as they may interact with some chemotherapy agents.    4.  Discussed dietary modifications and the need to maintain adequate caloric intake and proper oral hydration.  Recommended 64 ounces of fluid per day.    5.  Discussed anti-emetic protocol and bowel regimen protocol.    6.  Office contact information given including after hours number.  Discussed there is an oncologist on call 24/7, 365 days including weekends.  Provided primary nurse's information .    7.  In summary, the patient is in agreement with the plan of care.  Questions appeared to be answered to their satisfaction. Consented the patient to the treatment plan and the patient was educated on the planned duration of the treatment and schedule of the treatment administration. Copy to be scanned into the chart.    All questions answered to the satisfaction of the patient and family.     Follow up appointments given to patient.     AYDEN GARCIA  PATIENT EDUCATOR  [facility]

## 2024-09-18 DIAGNOSIS — C61 PROSTATE CANCER: Primary | ICD-10-CM

## 2024-09-18 RX ORDER — SODIUM CHLORIDE 0.9 % (FLUSH) 0.9 %
10 SYRINGE (ML) INJECTION
Status: CANCELLED | OUTPATIENT
Start: 2024-09-19

## 2024-09-18 RX ORDER — DIPHENHYDRAMINE HYDROCHLORIDE 50 MG/ML
50 INJECTION INTRAMUSCULAR; INTRAVENOUS ONCE AS NEEDED
Status: CANCELLED | OUTPATIENT
Start: 2024-09-19

## 2024-09-18 RX ORDER — HEPARIN 100 UNIT/ML
500 SYRINGE INTRAVENOUS
Status: CANCELLED | OUTPATIENT
Start: 2024-09-19

## 2024-09-18 RX ORDER — PROCHLORPERAZINE EDISYLATE 5 MG/ML
5 INJECTION INTRAMUSCULAR; INTRAVENOUS ONCE AS NEEDED
Status: CANCELLED | OUTPATIENT
Start: 2024-09-19

## 2024-09-18 RX ORDER — PROCHLORPERAZINE MALEATE 5 MG
5 TABLET ORAL EVERY 6 HOURS PRN
Qty: 20 TABLET | Refills: 5 | Status: SHIPPED | OUTPATIENT
Start: 2024-09-18

## 2024-09-18 RX ORDER — EPINEPHRINE 0.3 MG/.3ML
0.3 INJECTION SUBCUTANEOUS ONCE AS NEEDED
Status: CANCELLED | OUTPATIENT
Start: 2024-09-19

## 2024-09-19 ENCOUNTER — INFUSION (OUTPATIENT)
Dept: INFUSION THERAPY | Facility: HOSPITAL | Age: 69
End: 2024-09-19
Attending: INTERNAL MEDICINE
Payer: MEDICARE

## 2024-09-19 VITALS
HEIGHT: 68 IN | SYSTOLIC BLOOD PRESSURE: 164 MMHG | BODY MASS INDEX: 29.16 KG/M2 | WEIGHT: 192.38 LBS | RESPIRATION RATE: 18 BRPM | HEART RATE: 65 BPM | DIASTOLIC BLOOD PRESSURE: 65 MMHG

## 2024-09-19 DIAGNOSIS — C61 PROSTATE CANCER: Primary | ICD-10-CM

## 2024-09-19 PROCEDURE — 25000003 PHARM REV CODE 250: Performed by: INTERNAL MEDICINE

## 2024-09-19 PROCEDURE — 63600175 PHARM REV CODE 636 W HCPCS: Performed by: INTERNAL MEDICINE

## 2024-09-19 PROCEDURE — 96377 APPLICATON ON-BODY INJECTOR: CPT

## 2024-09-19 PROCEDURE — 96367 TX/PROPH/DG ADDL SEQ IV INF: CPT

## 2024-09-19 PROCEDURE — 96413 CHEMO IV INFUSION 1 HR: CPT

## 2024-09-19 RX ORDER — HEPARIN 100 UNIT/ML
500 SYRINGE INTRAVENOUS
Status: DISCONTINUED | OUTPATIENT
Start: 2024-09-19 | End: 2024-09-19 | Stop reason: HOSPADM

## 2024-09-19 RX ORDER — PROCHLORPERAZINE EDISYLATE 5 MG/ML
5 INJECTION INTRAMUSCULAR; INTRAVENOUS ONCE AS NEEDED
Status: DISCONTINUED | OUTPATIENT
Start: 2024-09-19 | End: 2024-09-19 | Stop reason: HOSPADM

## 2024-09-19 RX ORDER — SODIUM CHLORIDE 0.9 % (FLUSH) 0.9 %
10 SYRINGE (ML) INJECTION
Status: DISCONTINUED | OUTPATIENT
Start: 2024-09-19 | End: 2024-09-19 | Stop reason: HOSPADM

## 2024-09-19 RX ORDER — DIPHENHYDRAMINE HYDROCHLORIDE 50 MG/ML
50 INJECTION INTRAMUSCULAR; INTRAVENOUS ONCE AS NEEDED
Status: DISCONTINUED | OUTPATIENT
Start: 2024-09-19 | End: 2024-09-19 | Stop reason: HOSPADM

## 2024-09-19 RX ORDER — EPINEPHRINE 0.3 MG/.3ML
0.3 INJECTION SUBCUTANEOUS ONCE AS NEEDED
Status: DISCONTINUED | OUTPATIENT
Start: 2024-09-19 | End: 2024-09-19 | Stop reason: HOSPADM

## 2024-09-19 RX ADMIN — PEGFILGRASTIM 6 MG: KIT SUBCUTANEOUS at 02:09

## 2024-09-19 RX ADMIN — DEXAMETHASONE SODIUM PHOSPHATE 20 MG: 4 INJECTION, SOLUTION INTRA-ARTICULAR; INTRALESIONAL; INTRAMUSCULAR; INTRAVENOUS; SOFT TISSUE at 09:09

## 2024-09-19 RX ADMIN — DOCETAXEL 120 MG: 20 INJECTION, SOLUTION, CONCENTRATE INTRAVENOUS at 09:09

## 2024-09-23 PROBLEM — D70.1 LEUKOPENIA DUE TO ANTINEOPLASTIC CHEMOTHERAPY: Status: ACTIVE | Noted: 2024-09-23

## 2024-09-23 PROBLEM — C79.51 METASTASIS TO BONE: Status: ACTIVE | Noted: 2024-09-23

## 2024-09-23 PROBLEM — Z79.899 IMMUNODEFICIENCY DUE TO DRUGS: Status: ACTIVE | Noted: 2024-09-23

## 2024-09-23 PROBLEM — T45.1X5A LEUKOPENIA DUE TO ANTINEOPLASTIC CHEMOTHERAPY: Status: ACTIVE | Noted: 2024-09-23

## 2024-09-23 PROBLEM — N18.31 STAGE 3A CHRONIC KIDNEY DISEASE: Status: ACTIVE | Noted: 2024-09-04

## 2024-09-23 PROBLEM — D84.821 IMMUNODEFICIENCY DUE TO DRUGS: Status: ACTIVE | Noted: 2024-09-23

## 2024-09-23 PROBLEM — C78.7 METASTASES TO THE LIVER: Status: ACTIVE | Noted: 2024-09-23

## 2024-09-23 NOTE — PROGRESS NOTES
Subjective:       Patient ID: Juan Mayo is a 69 y.o. male.    PCP: Dr. Dalton Ang  Urology: Dr. Luis Worthy    1. Recurrence Stage IV--2024  H/o Prostate Cancer Stage IIA ([id number])--Diagnosed   Biopsy/pathology:  Prostate biopsy 2018--10/12 cores positive for adenocarcinoma, [place] 7.  CT-guided liver biopsy done 24--metastatic carcinoma c/w prostate primary site.   Imagin. NM Bone Scan 18 at [facility]--small focus of increased activity overlies posterior right 10th rib, more intense focus of asymmetric activity right of midline T1 segment laterally, most typical degenerative origin, no findings elsewhere.  2. Prostate MRI w/ and w/o contrast OLOL 18--biopsy-proven high volume disease largely obscured by extensive biopsy-related hemorrhage, highest volume of disease right apex, no gross extracapsular disease, no seminal vesicle invasion or neurovascular bundle involvement, no skeletal or ranjeet metastases.  3. PSMA PET/CT on 24--Low level uptake within prominent pelvic lymph nodes suspicious for metastatic disease. Activity above baseline involving the cervical, thoracic and lumbar spine as well as the right ischial tuberosity compatible with osseous metastatic disease. Innumerable photopenic, hypodense hepatic lesions suspicious for metastatic disease. Correlate with MR.  Pulmonary emphysema. Emphysema on CT is an independent risk factor for lung cancer. Low-dose cancer screening to be considered in the future, if patient does not already enrolled in such a program.   4. MRI abdomen w&w/o contrast on 24--Numerous liver metastases noted.  Greater than 30 lesions seen, appear roughly larger and more numerous compared to prior scan in 2024, index lesion measured in the right posterolateral aspect of the right hepatic lobe measuring 3.8 cm, gallbladder and biliary ductal system normal, pancreas normal, possible periaortic lymph node on the left measuring 1.2 cm, numerous metastatic  liver lesions.  Lesions appear roughly larger and more numerous compared to a prior noncontrast CT exam from 7/19/24, possible single lymph node metastasis left periaortic region.     2. Anemia--Since 2019    Genetic testing:  Eliezer done and negative.     Work-up:  2/2020--retic 1.4.  2/2021--iron saturation 22%, ferritin 260, vitamin B12 678.  3/2021--LDH normal, Haptoglobin normal, retic 1.9 (low-normal), folic acid normal, iron saturation 25%, ferritin 200, Radah negative, TSH normal 0.81, peripheral smear with normocytic anemia w/o anisocytosis.  6/2021--SPEP no M-spike, MAY negative, RF normal.    PSA:  01/2021--0.09, testosterone 199 (normal 343-1275).  08/27/24--113.73  09/17/24--30.63    Treatment history:  1. Prostate radiation 9/25/18--11/27/18.  2. Lupron/ADT X 1 year--completed in 7/2019.     Treatment plan:  Eligard 45mg subQ every 6 months started 8/27/24.   Darolutamide 600mg PO BID started on 9/8/24.   Taxotere every 3 weeks x 6 cycles. Neulasta given. Started on 9/19/24.   Cycle 2 due on 10/10/24. IV fluids added.     Chief Complaint: Prostate Cancer (Fatigue. )    HPI   Patient presents for a treatment visit for his new/recurrent prostate cancer. He was started on Taxotere last week. Started the Darolutamide at the beginning of September. He tolerated treatment very well. Mentions he had some fatigue for a few days but otherwise no problems. Appetite good and weight stable. Denies any n/v/c/d. He does have a mild rash to his face but it is not bothersome. He is still waiting on appointment for mediport placement. No other problems reported.     Past Medical History:   Diagnosis Date    Anemia, unspecified     Bilateral carotid artery stenosis     Essential (primary) hypertension     Fatigue     Prostate cancer     Urinary retention     Vitamin D deficiency       Review of patient's allergies indicates:  No Known Allergies   Current Outpatient Medications on File Prior to Visit   Medication Sig  Dispense Refill    aspirin 81 mg Cap Take 81 mg by mouth.      atorvastatin (LIPITOR) 20 MG tablet Take 1 tablet (20 mg total) by mouth every evening. 30 tablet 11    cholecalciferol, vitamin D3, (VITAMIN D3) 25 mcg (1,000 unit) capsule Take 25 mcg by mouth.      darolutamide 300 mg Tab Take 300 mg by mouth 2 (two) times daily.      fenofibrate 160 MG Tab Take 1 tablet (160 mg total) by mouth once daily. (Patient taking differently: Take 160 mg by mouth nightly.) 30 tablet 11    folic acid (FOLVITE) 1 MG tablet Take 1 tablet (1 mg total) by mouth once daily. 90 tablet 3    multivitamin (MULTIPLE VITAMIN ORAL) Take 1 capsule by mouth once daily.      NIFEdipine (PROCARDIA-XL) 30 MG (OSM) 24 hr tablet Take 1 tablet (30 mg total) by mouth every morning. 30 tablet 11    ondansetron (ZOFRAN-ODT) 8 MG TbDL Take 1 tablet (8 mg total) by mouth every 8 (eight) hours as needed (nausea). 30 tablet 1    prochlorperazine (COMPAZINE) 5 MG tablet Take 1 tablet (5 mg total) by mouth every 6 (six) hours as needed for Nausea. 20 tablet 5    ramipriL (ALTACE) 5 MG capsule Take 1 tablet by mouth daily. 30 capsule 11    tamsulosin (FLOMAX) 0.4 mg Cap Take 0.4 mg by mouth once daily.       No current facility-administered medications on file prior to visit.      Review of Systems   Constitutional:  Positive for fatigue. Negative for appetite change and unexpected weight change.   HENT:  Negative for mouth sores.    Eyes:  Negative for visual disturbance.   Respiratory:  Negative for cough.    Cardiovascular:  Negative for chest pain.   Gastrointestinal:  Negative for abdominal pain and diarrhea.   Genitourinary:  Negative for frequency.   Musculoskeletal:  Negative for back pain.   Integumentary:  Negative for rash.   Neurological:  Positive for headaches.   Hematological:  Negative for adenopathy.   Psychiatric/Behavioral:  The patient is not nervous/anxious.      Wt Readings from Last 3 Encounters:   09/25/24 1448 86.7 kg (191 lb 1.6  oz)   09/19/24 0855 87.3 kg (192 lb 6.3 oz)   09/05/24 1509 87.3 kg (192 lb 6.4 oz)     Physical Exam  Constitutional:       General: He is awake.      Appearance: Normal appearance. He is normal weight.   HENT:      Head: Normocephalic.      Comments: Mild erythematous rash noted to bilateral cheeks.  Eyes:      General: Lids are normal. Vision grossly intact.      Extraocular Movements: Extraocular movements intact.      Conjunctiva/sclera: Conjunctivae normal.   Cardiovascular:      Rate and Rhythm: Normal rate and regular rhythm.      Pulses: Normal pulses.      Heart sounds: Normal heart sounds.   Pulmonary:      Effort: Pulmonary effort is normal.      Breath sounds: Normal breath sounds and air entry.   Abdominal:      General: Abdomen is flat. Bowel sounds are normal.      Palpations: Abdomen is soft.   Musculoskeletal:      Cervical back: Normal range of motion and neck supple.   Lymphadenopathy:      Comments: No palpable adenopathy   Skin:     General: Skin is warm and moist.   Neurological:      General: No focal deficit present.      Mental Status: He is alert and oriented to person, place, and time.   Psychiatric:         Attention and Perception: Attention normal.         Mood and Affect: Mood normal.         Speech: Speech normal.         Behavior: Behavior is cooperative.         Thought Content: Thought content normal.         Cognition and Memory: Cognition normal.         Judgment: Judgment normal.        No visits with results within 1 Week(s) from this visit.   Latest known visit with results is:   Lab Visit on 09/17/2024   Component Date Value    Sodium 09/17/2024 144     Potassium 09/17/2024 4.6     Chloride 09/17/2024 113 (H)     CO2 09/17/2024 22 (L)     Glucose 09/17/2024 100     Blood Urea Nitrogen 09/17/2024 29.2 (H)     Creatinine 09/17/2024 1.32 (H)     Calcium 09/17/2024 9.9     Protein Total 09/17/2024 7.0     Albumin 09/17/2024 3.9     Globulin 09/17/2024 3.1     Albumin/Globulin  Ratio 09/17/2024 1.3     Bilirubin Total 09/17/2024 0.5     ALP 09/17/2024 128     ALT 09/17/2024 58 (H)     AST 09/17/2024 57 (H)     eGFR 09/17/2024 58     Anion Gap 09/17/2024 9.0     BUN/Creatinine Ratio 09/17/2024 22     Prostate Specific Antigen 09/17/2024 30.63 (H)     WBC 09/17/2024 4.47 (L)     RBC 09/17/2024 4.12 (L)     Hgb 09/17/2024 12.6 (L)     Hct 09/17/2024 37.7 (L)     MCV 09/17/2024 91.5     MCH 09/17/2024 30.6     MCHC 09/17/2024 33.4     RDW 09/17/2024 12.4     Platelet 09/17/2024 208     MPV 09/17/2024 10.0     Neut % 09/17/2024 69.5     Lymph % 09/17/2024 17.2     Mono % 09/17/2024 9.8     Eos % 09/17/2024 2.2     Basophil % 09/17/2024 1.1     Lymph # 09/17/2024 0.77     Neut # 09/17/2024 3.10     Mono # 09/17/2024 0.44     Eos # 09/17/2024 0.10     Baso # 09/17/2024 0.05     IG# 09/17/2024 0.01     IG% 09/17/2024 0.2       Assessment:       1. Prostate cancer    2. Anemia in stage 3b chronic kidney disease    3. 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 Metastasis to bone    6. Leukopenia due to antineoplastic chemotherapy          Plan:       Patient was following with me for a mild anemia.  H/o Prostate cancer in 2018, diagnosed with recurrence 7/2024 liver and bone metastases seen on PSMA PET done for elevated PSA.    Patient underwent liver biopsy on 8/27/24, pathology c/w metastatic prostate cancer.  Started on Eligard 8/27/24 with urology Dr. Worthy and Darolutamide ordered, but patient has not received yet.  Per Dr. Tobias, genetic testing negative and liquid Guardant also done, will obtain results.  Dr. Tobias also ordering CARIS for liver biopsy.    Recommend treatment with triplet therapy ADT with Docetaxel plus Darolutamide given multi-organ involvement/high volume metastases per [facility].   Dr. Zamora added Taxotere chemotherapy to his current regimen. Plan for 6 cycles at 60mg/m2 given his age.  Darolutamide started on 9/8/24.     Cycle 1 started on 9/19/24.  Tolerated well. Neulasta was given.   CBC today shows slight worsening anemia with Hgb of 11.0 g/dL. Mild leukopenia with ANC 2000. CMP pending.   Continue weekly labs.   Cycle 2 due on 10/10/24. Will add IV fluids with treatment to help with his CKD.   Follow-up in 3 weeks with labs.   Plan to repeat PSMA PET after chemotherapy is done and will monitor PSA in between.  Referred to Dr. Proctor for mediport placement but when he was contacted, he declined stating he would reach out to Dr. Gutierrez. Will call and make sure Dr. DAVID Gutierrez can place his mediport. He will need before 10/10/24.     Dr. Zamora discussed with patient and his wife incurable nature of is cancer and palliative goals of treatment.    All questions answered at this time.      Alden Obando, PC

## 2024-09-25 ENCOUNTER — LAB VISIT (OUTPATIENT)
Dept: LAB | Facility: HOSPITAL | Age: 69
End: 2024-09-25
Attending: INTERNAL MEDICINE
Payer: MEDICARE

## 2024-09-25 ENCOUNTER — OFFICE VISIT (OUTPATIENT)
Dept: HEMATOLOGY/ONCOLOGY | Facility: CLINIC | Age: 69
End: 2024-09-25
Payer: MEDICARE

## 2024-09-25 VITALS
RESPIRATION RATE: 18 BRPM | SYSTOLIC BLOOD PRESSURE: 139 MMHG | TEMPERATURE: 99 F | BODY MASS INDEX: 28.97 KG/M2 | HEIGHT: 68 IN | OXYGEN SATURATION: 97 % | HEART RATE: 83 BPM | WEIGHT: 191.13 LBS | DIASTOLIC BLOOD PRESSURE: 61 MMHG

## 2024-09-25 DIAGNOSIS — N18.32 ANEMIA IN STAGE 3B CHRONIC KIDNEY DISEASE: ICD-10-CM

## 2024-09-25 DIAGNOSIS — T45.1X5A LEUKOPENIA DUE TO ANTINEOPLASTIC CHEMOTHERAPY: ICD-10-CM

## 2024-09-25 DIAGNOSIS — C79.51 METASTASIS TO BONE: ICD-10-CM

## 2024-09-25 DIAGNOSIS — C61 PROSTATE CANCER: ICD-10-CM

## 2024-09-25 DIAGNOSIS — Z79.899 IMMUNODEFICIENCY DUE TO DRUGS: ICD-10-CM

## 2024-09-25 DIAGNOSIS — D63.1 ANEMIA IN STAGE 3B CHRONIC KIDNEY DISEASE: ICD-10-CM

## 2024-09-25 DIAGNOSIS — D70.1 LEUKOPENIA DUE TO ANTINEOPLASTIC CHEMOTHERAPY: ICD-10-CM

## 2024-09-25 DIAGNOSIS — C78.7 METASTASES TO THE LIVER: ICD-10-CM

## 2024-09-25 DIAGNOSIS — D84.821 IMMUNODEFICIENCY DUE TO DRUGS: ICD-10-CM

## 2024-09-25 DIAGNOSIS — C61 PROSTATE CANCER: Primary | ICD-10-CM

## 2024-09-25 LAB
ABS NEUT CALC (OHS): 2.18 X10(3)/MCL (ref 2.1–9.2)
ALBUMIN SERPL-MCNC: 3.5 G/DL (ref 3.4–4.8)
ALBUMIN/GLOB SERPL: 1.2 RATIO (ref 1.1–2)
ALP SERPL-CCNC: 152 UNIT/L (ref 40–150)
ALT SERPL-CCNC: 41 UNIT/L (ref 0–55)
ANION GAP SERPL CALC-SCNC: 7 MEQ/L
AST SERPL-CCNC: 36 UNIT/L (ref 5–34)
BILIRUB SERPL-MCNC: 0.6 MG/DL
BUN SERPL-MCNC: 19.8 MG/DL (ref 8.4–25.7)
CALCIUM SERPL-MCNC: 9.7 MG/DL (ref 8.8–10)
CHLORIDE SERPL-SCNC: 107 MMOL/L (ref 98–107)
CO2 SERPL-SCNC: 25 MMOL/L (ref 23–31)
CREAT SERPL-MCNC: 1.46 MG/DL (ref 0.73–1.18)
CREAT/UREA NIT SERPL: 14
EOSINOPHIL NFR BLD MANUAL: 0.03 X10(3)/MCL (ref 0–0.9)
EOSINOPHIL NFR BLD MANUAL: 1 % (ref 0–8)
ERYTHROCYTE [DISTWIDTH] IN BLOOD BY AUTOMATED COUNT: 12.6 % (ref 11.5–17)
GFR SERPLBLD CREATININE-BSD FMLA CKD-EPI: 51 ML/MIN/1.73/M2
GLOBULIN SER-MCNC: 2.9 GM/DL (ref 2.4–3.5)
GLUCOSE SERPL-MCNC: 183 MG/DL (ref 82–115)
HCT VFR BLD AUTO: 33.3 % (ref 42–52)
HGB BLD-MCNC: 11 G/DL (ref 14–18)
LYMPHOCYTES NFR BLD MANUAL: 0.71 X10(3)/MCL
LYMPHOCYTES NFR BLD MANUAL: 21 % (ref 13–40)
MCH RBC QN AUTO: 30.6 PG (ref 27–31)
MCHC RBC AUTO-ENTMCNC: 33 G/DL (ref 33–36)
MCV RBC AUTO: 92.5 FL (ref 80–94)
METAMYELOCYTES NFR BLD MANUAL: 1 %
MONOCYTES NFR BLD MANUAL: 0.2 X10(3)/MCL (ref 0.1–1.3)
MONOCYTES NFR BLD MANUAL: 6 % (ref 2–11)
MYELOCYTES NFR BLD MANUAL: 4 %
NEUTROPHILS NFR BLD MANUAL: 65 % (ref 47–80)
PLATELET # BLD AUTO: 138 X10(3)/MCL (ref 130–400)
PLATELET # BLD EST: NORMAL 10*3/UL
PMV BLD AUTO: 10.5 FL (ref 7.4–10.4)
POIKILOCYTOSIS BLD QL SMEAR: ABNORMAL
POTASSIUM SERPL-SCNC: 4.1 MMOL/L (ref 3.5–5.1)
PROMYELOCYTES # BLD MANUAL: 2 %
PROT SERPL-MCNC: 6.4 GM/DL (ref 5.8–7.6)
RBC # BLD AUTO: 3.6 X10(6)/MCL (ref 4.7–6.1)
RBC MORPH BLD: ABNORMAL
SODIUM SERPL-SCNC: 139 MMOL/L (ref 136–145)
WBC # BLD AUTO: 3.36 X10(3)/MCL (ref 4.5–11.5)

## 2024-09-25 PROCEDURE — 99215 OFFICE O/P EST HI 40 MIN: CPT | Mod: S$PBB,,, | Performed by: NURSE PRACTITIONER

## 2024-09-25 PROCEDURE — 99999 PR PBB SHADOW E&M-EST. PATIENT-LVL IV: CPT | Mod: PBBFAC,,, | Performed by: NURSE PRACTITIONER

## 2024-09-25 PROCEDURE — 80053 COMPREHEN METABOLIC PANEL: CPT

## 2024-09-25 PROCEDURE — 99214 OFFICE O/P EST MOD 30 MIN: CPT | Mod: PBBFAC | Performed by: NURSE PRACTITIONER

## 2024-09-25 PROCEDURE — 36415 COLL VENOUS BLD VENIPUNCTURE: CPT

## 2024-09-25 PROCEDURE — 85027 COMPLETE CBC AUTOMATED: CPT

## 2024-10-02 ENCOUNTER — LAB VISIT (OUTPATIENT)
Dept: LAB | Facility: HOSPITAL | Age: 69
End: 2024-10-02
Attending: NURSE PRACTITIONER
Payer: MEDICARE

## 2024-10-02 DIAGNOSIS — D70.1 LEUKOPENIA DUE TO ANTINEOPLASTIC CHEMOTHERAPY: ICD-10-CM

## 2024-10-02 DIAGNOSIS — Z79.899 IMMUNODEFICIENCY DUE TO DRUGS: ICD-10-CM

## 2024-10-02 DIAGNOSIS — D63.1 ANEMIA IN STAGE 3B CHRONIC KIDNEY DISEASE: ICD-10-CM

## 2024-10-02 DIAGNOSIS — C78.7 METASTASES TO THE LIVER: ICD-10-CM

## 2024-10-02 DIAGNOSIS — N18.32 ANEMIA IN STAGE 3B CHRONIC KIDNEY DISEASE: ICD-10-CM

## 2024-10-02 DIAGNOSIS — T45.1X5A LEUKOPENIA DUE TO ANTINEOPLASTIC CHEMOTHERAPY: ICD-10-CM

## 2024-10-02 DIAGNOSIS — C79.51 METASTASIS TO BONE: ICD-10-CM

## 2024-10-02 DIAGNOSIS — C61 PROSTATE CANCER: ICD-10-CM

## 2024-10-02 DIAGNOSIS — D84.821 IMMUNODEFICIENCY DUE TO DRUGS: ICD-10-CM

## 2024-10-02 LAB
ALBUMIN SERPL-MCNC: 3.8 G/DL (ref 3.4–4.8)
ALBUMIN/GLOB SERPL: 1.3 RATIO (ref 1.1–2)
ALP SERPL-CCNC: 145 UNIT/L (ref 40–150)
ALT SERPL-CCNC: 31 UNIT/L (ref 0–55)
ANION GAP SERPL CALC-SCNC: 7 MEQ/L
AST SERPL-CCNC: 29 UNIT/L (ref 5–34)
BASOPHILS # BLD AUTO: 0.04 X10(3)/MCL
BASOPHILS NFR BLD AUTO: 0.5 %
BILIRUB SERPL-MCNC: 0.4 MG/DL
BUN SERPL-MCNC: 27.5 MG/DL (ref 8.4–25.7)
CALCIUM SERPL-MCNC: 9.8 MG/DL (ref 8.8–10)
CHLORIDE SERPL-SCNC: 109 MMOL/L (ref 98–107)
CO2 SERPL-SCNC: 25 MMOL/L (ref 23–31)
CREAT SERPL-MCNC: 1.53 MG/DL (ref 0.73–1.18)
CREAT/UREA NIT SERPL: 18
EOSINOPHIL # BLD AUTO: 0.01 X10(3)/MCL (ref 0–0.9)
EOSINOPHIL NFR BLD AUTO: 0.1 %
ERYTHROCYTE [DISTWIDTH] IN BLOOD BY AUTOMATED COUNT: 12.6 % (ref 11.5–17)
FREE/TOTAL PSA (OLG): 0.2
GFR SERPLBLD CREATININE-BSD FMLA CKD-EPI: 48 ML/MIN/1.73/M2
GLOBULIN SER-MCNC: 3 GM/DL (ref 2.4–3.5)
GLUCOSE SERPL-MCNC: 97 MG/DL (ref 82–115)
HCT VFR BLD AUTO: 35.7 % (ref 42–52)
HGB BLD-MCNC: 11.6 G/DL (ref 14–18)
IMM GRANULOCYTES # BLD AUTO: 0.37 X10(3)/MCL (ref 0–0.04)
IMM GRANULOCYTES NFR BLD AUTO: 4.2 %
LYMPHOCYTES # BLD AUTO: 0.95 X10(3)/MCL (ref 0.6–4.6)
LYMPHOCYTES NFR BLD AUTO: 10.9 %
MCH RBC QN AUTO: 30.4 PG (ref 27–31)
MCHC RBC AUTO-ENTMCNC: 32.5 G/DL (ref 33–36)
MCV RBC AUTO: 93.5 FL (ref 80–94)
MONOCYTES # BLD AUTO: 0.44 X10(3)/MCL (ref 0.1–1.3)
MONOCYTES NFR BLD AUTO: 5 %
NEUTROPHILS # BLD AUTO: 6.93 X10(3)/MCL (ref 2.1–9.2)
NEUTROPHILS NFR BLD AUTO: 79.3 %
PLATELET # BLD AUTO: 152 X10(3)/MCL (ref 130–400)
PMV BLD AUTO: 9.7 FL (ref 7.4–10.4)
POTASSIUM SERPL-SCNC: 5.4 MMOL/L (ref 3.5–5.1)
PROT SERPL-MCNC: 6.8 GM/DL (ref 5.8–7.6)
PSA FREE MFR SERPL: 20.89 %
PSA FREE SERPL-MCNC: 0.99 NG/ML
PSA SERPL-MCNC: 4.74 NG/ML
RBC # BLD AUTO: 3.82 X10(6)/MCL (ref 4.7–6.1)
SODIUM SERPL-SCNC: 141 MMOL/L (ref 136–145)
WBC # BLD AUTO: 8.74 X10(3)/MCL (ref 4.5–11.5)

## 2024-10-02 PROCEDURE — 84153 ASSAY OF PSA TOTAL: CPT

## 2024-10-02 PROCEDURE — 80053 COMPREHEN METABOLIC PANEL: CPT

## 2024-10-02 PROCEDURE — 36415 COLL VENOUS BLD VENIPUNCTURE: CPT

## 2024-10-02 PROCEDURE — 84154 ASSAY OF PSA FREE: CPT

## 2024-10-02 PROCEDURE — 85025 COMPLETE CBC W/AUTO DIFF WBC: CPT

## 2024-10-03 ENCOUNTER — TELEPHONE (OUTPATIENT)
Dept: HEMATOLOGY/ONCOLOGY | Facility: CLINIC | Age: 69
End: 2024-10-03
Payer: MEDICARE

## 2024-10-03 NOTE — TELEPHONE ENCOUNTER
----- Message from JADEN Balderrama sent at 10/2/2024  9:01 AM CDT -----  Can you call and let him know his labs show renal insufficiency and that we recommend he drink more water. We will likely have to add IV fluids with his treatments.  ----- Message -----  From: Lab, Background User  Sent: 10/2/2024   8:54 AM CDT  To: JADEN Kearney

## 2024-10-07 RX ORDER — HEPARIN 100 UNIT/ML
500 SYRINGE INTRAVENOUS
Status: CANCELLED | OUTPATIENT
Start: 2024-10-10

## 2024-10-07 RX ORDER — DIPHENHYDRAMINE HYDROCHLORIDE 50 MG/ML
50 INJECTION INTRAMUSCULAR; INTRAVENOUS ONCE AS NEEDED
Status: CANCELLED | OUTPATIENT
Start: 2024-10-10

## 2024-10-07 RX ORDER — SODIUM CHLORIDE 0.9 % (FLUSH) 0.9 %
10 SYRINGE (ML) INJECTION
Status: CANCELLED | OUTPATIENT
Start: 2024-10-10

## 2024-10-07 RX ORDER — PROCHLORPERAZINE EDISYLATE 5 MG/ML
5 INJECTION INTRAMUSCULAR; INTRAVENOUS ONCE AS NEEDED
Status: CANCELLED | OUTPATIENT
Start: 2024-10-10

## 2024-10-07 RX ORDER — EPINEPHRINE 0.3 MG/.3ML
0.3 INJECTION SUBCUTANEOUS ONCE AS NEEDED
Status: CANCELLED | OUTPATIENT
Start: 2024-10-10

## 2024-10-07 NOTE — PROGRESS NOTES
Subjective:       Patient ID: Juan Mayo is a 69 y.o. male.    PCP: Dr. Dalton Ang  Urology: Dr. Luis Worthy    1. Recurrence Stage IV--2024  H/o Prostate Cancer Stage IIA ([id number])--Diagnosed   Biopsy/pathology:  Prostate biopsy 2018--10/12 cores positive for adenocarcinoma, [place] 7.  CT-guided liver biopsy done 24--metastatic carcinoma c/w prostate primary site.   Imagin. NM Bone Scan 18 at [facility]--small focus of increased activity overlies posterior right 10th rib, more intense focus of asymmetric activity right of midline T1 segment laterally, most typical degenerative origin, no findings elsewhere.  2. Prostate MRI w/ and w/o contrast [facility] 18--biopsy-proven high volume disease largely obscured by extensive biopsy-related hemorrhage, highest volume of disease right apex, no gross extracapsular disease, no seminal vesicle invasion or neurovascular bundle involvement, no skeletal or ranjeet metastases.  3. PSMA PET/CT on 24--Low level uptake within prominent pelvic lymph nodes suspicious for metastatic disease. Activity above baseline involving the cervical, thoracic and lumbar spine as well as the right ischial tuberosity compatible with osseous metastatic disease. Innumerable photopenic, hypodense hepatic lesions suspicious for metastatic disease. Correlate with MR.  Pulmonary emphysema. Emphysema on CT is an independent risk factor for lung cancer. Low-dose cancer screening to be considered in the future, if patient does not already enrolled in such a program.   4. MRI abdomen w&w/o contrast on 24--Numerous liver metastases noted.  Greater than 30 lesions seen, appear roughly larger and more numerous compared to prior scan in 2024, index lesion measured in the right posterolateral aspect of the right hepatic lobe measuring 3.8 cm, gallbladder and biliary ductal system normal, pancreas normal, possible periaortic lymph node on the left measuring 1.2 cm, numerous metastatic  liver lesions.  Lesions appear roughly larger and more numerous compared to a prior noncontrast CT exam from 7/19/24, possible single lymph node metastasis left periaortic region.     2. Anemia--Since 2019    Genetic testing:  Eliezer done and negative.     Work-up:  2/2020--retic 1.4.  2/2021--iron saturation 22%, ferritin 260, vitamin B12 678.  3/2021--LDH normal, Haptoglobin normal, retic 1.9 (low-normal), folic acid normal, iron saturation 25%, ferritin 200, Radha negative, TSH normal 0.81, peripheral smear with normocytic anemia w/o anisocytosis.  6/2021--SPEP no M-spike, MAY negative, RF normal.    PSA:  01/2021--0.09, testosterone 199 (normal 343-1275).  08/27/24--113.73  09/17/24--30.63  10/02/24--4.74    Treatment history:  1. Prostate radiation 9/25/18--11/27/18.  2. Lupron/ADT X 1 year--completed in 7/2019.     Treatment plan:  Eligard 45mg subQ every 6 months started 8/27/24.   Darolutamide 600mg PO BID started on 9/8/24.   Taxotere every 3 weeks x 6 cycles. Neulasta given. Started on 9/19/24.   Cycle 3 due on 10/31/24. IV fluids added.     Chief Complaint: 3wk f/u with labs    HPI   Patient presents for a treatment visit for his new/recurrent prostate cancer. He completed his 2nd cycle of Taxotere last week. IV fluids added. Started the Darolutamide at the beginning of September. He continues tolerating treatment very well. Mentions he has more fatigue for a few days but otherwise no problems. Appetite good and weight stable. Denies any n/v/c/d. No other problems reported.     Past Medical History:   Diagnosis Date    Anemia, unspecified     Bilateral carotid artery stenosis     Essential (primary) hypertension     Fatigue     Prostate cancer     Urinary retention     Vitamin D deficiency       Review of patient's allergies indicates:  No Known Allergies   Current Outpatient Medications on File Prior to Visit   Medication Sig Dispense Refill    aspirin 81 mg Cap Take 81 mg by mouth.      atorvastatin  (LIPITOR) 20 MG tablet Take 1 tablet (20 mg total) by mouth every evening. 30 tablet 11    cholecalciferol, vitamin D3, (VITAMIN D3) 25 mcg (1,000 unit) capsule Take 25 mcg by mouth.      darolutamide 300 mg Tab Take 300 mg by mouth 2 (two) times daily.      fenofibrate 160 MG Tab Take 1 tablet (160 mg total) by mouth once daily. 30 tablet 11    folic acid (FOLVITE) 1 MG tablet Take 1 tablet (1 mg total) by mouth once daily. 90 tablet 3    multivitamin (MULTIPLE VITAMIN ORAL) Take 1 capsule by mouth once daily.      NIFEdipine (PROCARDIA-XL) 30 MG (OSM) 24 hr tablet Take 1 tablet (30 mg total) by mouth every morning. 30 tablet 11    ondansetron (ZOFRAN-ODT) 8 MG TbDL Take 1 tablet (8 mg total) by mouth every 8 (eight) hours as needed (nausea). 30 tablet 1    prochlorperazine (COMPAZINE) 5 MG tablet Take 1 tablet (5 mg total) by mouth every 6 (six) hours as needed for Nausea. 20 tablet 5    ramipriL (ALTACE) 5 MG capsule Take 1 tablet by mouth daily. 30 capsule 11    tamsulosin (FLOMAX) 0.4 mg Cap Take 0.4 mg by mouth once daily.       No current facility-administered medications on file prior to visit.      Review of Systems   Constitutional:  Negative for appetite change and unexpected weight change.   HENT:  Negative for mouth sores.    Eyes:  Negative for visual disturbance.   Respiratory:  Negative for cough and shortness of breath.    Cardiovascular:  Negative for chest pain.   Gastrointestinal:  Negative for abdominal pain and diarrhea.   Genitourinary:  Negative for frequency.   Musculoskeletal:  Negative for back pain.   Integumentary:  Negative for rash.   Neurological:  Negative for headaches.   Hematological:  Negative for adenopathy.   Psychiatric/Behavioral:  The patient is not nervous/anxious.      Wt Readings from Last 3 Encounters:   10/16/24 1250 86.6 kg (191 lb)   10/10/24 1303 86.7 kg (191 lb 1.6 oz)   09/25/24 1448 86.7 kg (191 lb 1.6 oz)     Physical Exam  Constitutional:       General: He is  awake.      Appearance: Normal appearance. He is normal weight.   HENT:      Head: Normocephalic.   Eyes:      General: Lids are normal. Vision grossly intact.      Extraocular Movements: Extraocular movements intact.      Conjunctiva/sclera: Conjunctivae normal.   Cardiovascular:      Rate and Rhythm: Normal rate and regular rhythm.      Pulses: Normal pulses.      Heart sounds: Normal heart sounds.   Pulmonary:      Effort: Pulmonary effort is normal.      Breath sounds: Normal breath sounds and air entry.   Chest:      Comments: Left CW mediport intact, mild bruising noted.   Abdominal:      General: Abdomen is flat. Bowel sounds are normal.      Palpations: Abdomen is soft.   Musculoskeletal:      Cervical back: Normal range of motion and neck supple.   Lymphadenopathy:      Comments: No palpable adenopathy   Skin:     General: Skin is warm and moist.   Neurological:      General: No focal deficit present.      Mental Status: He is alert and oriented to person, place, and time.   Psychiatric:         Attention and Perception: Attention normal.         Mood and Affect: Mood normal.         Speech: Speech normal.         Behavior: Behavior is cooperative.         Thought Content: Thought content normal.         Cognition and Memory: Cognition normal.         Judgment: Judgment normal.        Lab Visit on 10/16/2024   Component Date Value    WBC 10/16/2024 2.68 (L)     RBC 10/16/2024 3.32 (L)     Hgb 10/16/2024 10.2 (L)     Hct 10/16/2024 30.8 (L)     MCV 10/16/2024 92.8     MCH 10/16/2024 30.7     MCHC 10/16/2024 33.1     RDW 10/16/2024 13.0     Platelet 10/16/2024 167     MPV 10/16/2024 10.3     Neut % 10/16/2024 61.9     Lymph % 10/16/2024 25.0     Mono % 10/16/2024 9.0     Eos % 10/16/2024 2.6     Basophil % 10/16/2024 1.1     Lymph # 10/16/2024 0.67     Neut # 10/16/2024 1.66 (L)     Mono # 10/16/2024 0.24     Eos # 10/16/2024 0.07     Baso # 10/16/2024 0.03     IG# 10/16/2024 0.01     IG% 10/16/2024 0.4        Assessment:       1. Prostate cancer    2. Anemia in stage 3a chronic kidney disease    3. Immunodeficiency due to drugs    4. Metastases to the liver    5. Metastasis to bone    6. Leukopenia due to antineoplastic chemotherapy          Plan:       Patient was following with me for a mild anemia.  H/o Prostate cancer in 2018, diagnosed with recurrence 7/2024 liver and bone metastases seen on PSMA PET done for elevated PSA.    Patient underwent liver biopsy on 8/27/24, pathology c/w metastatic prostate cancer.  Started on Eligard 8/27/24 with urology Dr. Worthy and Darolutamide ordered, but patient has not received yet.  Per Dr. Tobias, genetic testing negative and liquid Guardant also done, will obtain results.  Dr. Tobias also ordering CARIS for liver biopsy.    Recommend treatment with triplet therapy ADT with Docetaxel plus Darolutamide given multi-organ involvement/high volume metastases per NCCN.   Dr. Zamora added Taxotere chemotherapy to his current regimen. Plan for 6 cycles at 60mg/m2 given his age.  Darolutamide started on 9/8/24.     Cycle 1 started on 9/19/24. Tolerated well. Neulasta was given.  Completed Cycle 2 last week. IV fluids added.    CBC today shows worsening anemia with Hgb of 10.2 g/dL. Mild leukopenia with ANC 1660. CMP pending.   PSA trending down.   Labs on 10/30/24. Cycle 3 due on 10/31/24. Continue IV fluids with treatment to help with his CKD.   Follow-up in 3 weeks with labs.   Plan to repeat PSMA PET after chemotherapy is done and will monitor PSA in between.  Referred to Dr. Gutierrez for mediport placement, done on 10/7/24.     Dr. Zamora discussed with patient and his wife incurable nature of is cancer and palliative goals of treatment.    All questions answered at this time.      Alden Obando, PC

## 2024-10-09 ENCOUNTER — LAB VISIT (OUTPATIENT)
Dept: LAB | Facility: HOSPITAL | Age: 69
End: 2024-10-09
Attending: NURSE PRACTITIONER
Payer: MEDICARE

## 2024-10-09 DIAGNOSIS — Z79.899 IMMUNODEFICIENCY DUE TO DRUGS: ICD-10-CM

## 2024-10-09 DIAGNOSIS — T45.1X5A LEUKOPENIA DUE TO ANTINEOPLASTIC CHEMOTHERAPY: ICD-10-CM

## 2024-10-09 DIAGNOSIS — D63.1 ANEMIA IN STAGE 3B CHRONIC KIDNEY DISEASE: ICD-10-CM

## 2024-10-09 DIAGNOSIS — C61 PROSTATE CANCER: ICD-10-CM

## 2024-10-09 DIAGNOSIS — N18.32 ANEMIA IN STAGE 3B CHRONIC KIDNEY DISEASE: ICD-10-CM

## 2024-10-09 DIAGNOSIS — C79.51 METASTASIS TO BONE: ICD-10-CM

## 2024-10-09 DIAGNOSIS — C78.7 METASTASES TO THE LIVER: ICD-10-CM

## 2024-10-09 DIAGNOSIS — D84.821 IMMUNODEFICIENCY DUE TO DRUGS: ICD-10-CM

## 2024-10-09 DIAGNOSIS — D70.1 LEUKOPENIA DUE TO ANTINEOPLASTIC CHEMOTHERAPY: ICD-10-CM

## 2024-10-09 LAB
ALBUMIN SERPL-MCNC: 3.7 G/DL (ref 3.4–4.8)
ALBUMIN/GLOB SERPL: 1.3 RATIO (ref 1.1–2)
ALP SERPL-CCNC: 114 UNIT/L (ref 40–150)
ALT SERPL-CCNC: 37 UNIT/L (ref 0–55)
ANION GAP SERPL CALC-SCNC: 8 MEQ/L
AST SERPL-CCNC: 36 UNIT/L (ref 5–34)
BASOPHILS # BLD AUTO: 0.06 X10(3)/MCL
BASOPHILS NFR BLD AUTO: 1.5 %
BILIRUB SERPL-MCNC: 0.4 MG/DL
BUN SERPL-MCNC: 20.2 MG/DL (ref 8.4–25.7)
CALCIUM SERPL-MCNC: 9.6 MG/DL (ref 8.8–10)
CHLORIDE SERPL-SCNC: 109 MMOL/L (ref 98–107)
CO2 SERPL-SCNC: 27 MMOL/L (ref 23–31)
CREAT SERPL-MCNC: 1.25 MG/DL (ref 0.73–1.18)
CREAT/UREA NIT SERPL: 16
EOSINOPHIL # BLD AUTO: 0.01 X10(3)/MCL (ref 0–0.9)
EOSINOPHIL NFR BLD AUTO: 0.2 %
ERYTHROCYTE [DISTWIDTH] IN BLOOD BY AUTOMATED COUNT: 12.6 % (ref 11.5–17)
GFR SERPLBLD CREATININE-BSD FMLA CKD-EPI: >60 ML/MIN/1.73/M2
GLOBULIN SER-MCNC: 2.9 GM/DL (ref 2.4–3.5)
GLUCOSE SERPL-MCNC: 124 MG/DL (ref 82–115)
HCT VFR BLD AUTO: 33.5 % (ref 42–52)
HGB BLD-MCNC: 10.7 G/DL (ref 14–18)
IMM GRANULOCYTES # BLD AUTO: 0.03 X10(3)/MCL (ref 0–0.04)
IMM GRANULOCYTES NFR BLD AUTO: 0.7 %
LYMPHOCYTES # BLD AUTO: 0.7 X10(3)/MCL (ref 0.6–4.6)
LYMPHOCYTES NFR BLD AUTO: 17.1 %
MCH RBC QN AUTO: 30.1 PG (ref 27–31)
MCHC RBC AUTO-ENTMCNC: 31.9 G/DL (ref 33–36)
MCV RBC AUTO: 94.4 FL (ref 80–94)
MONOCYTES # BLD AUTO: 0.38 X10(3)/MCL (ref 0.1–1.3)
MONOCYTES NFR BLD AUTO: 9.3 %
NEUTROPHILS # BLD AUTO: 2.92 X10(3)/MCL (ref 2.1–9.2)
NEUTROPHILS NFR BLD AUTO: 71.2 %
PLATELET # BLD AUTO: 203 X10(3)/MCL (ref 130–400)
PMV BLD AUTO: 9.7 FL (ref 7.4–10.4)
POTASSIUM SERPL-SCNC: 4.3 MMOL/L (ref 3.5–5.1)
PROT SERPL-MCNC: 6.6 GM/DL (ref 5.8–7.6)
RBC # BLD AUTO: 3.55 X10(6)/MCL (ref 4.7–6.1)
SODIUM SERPL-SCNC: 144 MMOL/L (ref 136–145)
WBC # BLD AUTO: 4.1 X10(3)/MCL (ref 4.5–11.5)

## 2024-10-09 PROCEDURE — 80053 COMPREHEN METABOLIC PANEL: CPT

## 2024-10-09 PROCEDURE — 36415 COLL VENOUS BLD VENIPUNCTURE: CPT

## 2024-10-09 PROCEDURE — 85025 COMPLETE CBC W/AUTO DIFF WBC: CPT

## 2024-10-10 ENCOUNTER — INFUSION (OUTPATIENT)
Dept: INFUSION THERAPY | Facility: HOSPITAL | Age: 69
End: 2024-10-10
Attending: INTERNAL MEDICINE
Payer: MEDICARE

## 2024-10-10 VITALS
DIASTOLIC BLOOD PRESSURE: 55 MMHG | BODY MASS INDEX: 28.97 KG/M2 | WEIGHT: 191.13 LBS | HEIGHT: 68 IN | SYSTOLIC BLOOD PRESSURE: 130 MMHG | HEART RATE: 96 BPM | RESPIRATION RATE: 18 BRPM | TEMPERATURE: 98 F

## 2024-10-10 DIAGNOSIS — C61 PROSTATE CANCER: Primary | ICD-10-CM

## 2024-10-10 PROCEDURE — 96413 CHEMO IV INFUSION 1 HR: CPT

## 2024-10-10 PROCEDURE — 96377 APPLICATON ON-BODY INJECTOR: CPT

## 2024-10-10 PROCEDURE — 25000003 PHARM REV CODE 250: Performed by: INTERNAL MEDICINE

## 2024-10-10 PROCEDURE — 96367 TX/PROPH/DG ADDL SEQ IV INF: CPT

## 2024-10-10 PROCEDURE — 63600175 PHARM REV CODE 636 W HCPCS: Performed by: INTERNAL MEDICINE

## 2024-10-10 RX ORDER — DIPHENHYDRAMINE HYDROCHLORIDE 50 MG/ML
50 INJECTION INTRAMUSCULAR; INTRAVENOUS ONCE AS NEEDED
Status: DISCONTINUED | OUTPATIENT
Start: 2024-10-10 | End: 2024-10-10 | Stop reason: HOSPADM

## 2024-10-10 RX ORDER — PROCHLORPERAZINE EDISYLATE 5 MG/ML
5 INJECTION INTRAMUSCULAR; INTRAVENOUS ONCE AS NEEDED
Status: DISCONTINUED | OUTPATIENT
Start: 2024-10-10 | End: 2024-10-10 | Stop reason: HOSPADM

## 2024-10-10 RX ORDER — SODIUM CHLORIDE 0.9 % (FLUSH) 0.9 %
10 SYRINGE (ML) INJECTION
Status: DISCONTINUED | OUTPATIENT
Start: 2024-10-10 | End: 2024-10-10 | Stop reason: HOSPADM

## 2024-10-10 RX ORDER — EPINEPHRINE 0.3 MG/.3ML
0.3 INJECTION SUBCUTANEOUS ONCE AS NEEDED
Status: DISCONTINUED | OUTPATIENT
Start: 2024-10-10 | End: 2024-10-10 | Stop reason: HOSPADM

## 2024-10-10 RX ORDER — HEPARIN 100 UNIT/ML
500 SYRINGE INTRAVENOUS
Status: DISCONTINUED | OUTPATIENT
Start: 2024-10-10 | End: 2024-10-10 | Stop reason: HOSPADM

## 2024-10-10 RX ADMIN — DEXAMETHASONE SODIUM PHOSPHATE 20 MG: 4 INJECTION, SOLUTION INTRAMUSCULAR; INTRAVENOUS at 01:10

## 2024-10-10 RX ADMIN — DOCETAXEL 120 MG: 20 INJECTION, SOLUTION, CONCENTRATE INTRAVENOUS at 01:10

## 2024-10-10 RX ADMIN — HEPARIN 500 UNITS: 100 SYRINGE at 02:10

## 2024-10-10 RX ADMIN — SODIUM CHLORIDE 1000 ML: 9 INJECTION, SOLUTION INTRAVENOUS at 01:10

## 2024-10-10 NOTE — PLAN OF CARE
C2d1 taxotere and 1 L NS given as ordered.  Lukas well.  Neulasta on-pro applied to left arm.  Rtc next week for visit with provider as scheduled.

## 2024-10-16 ENCOUNTER — LAB VISIT (OUTPATIENT)
Dept: LAB | Facility: HOSPITAL | Age: 69
End: 2024-10-16
Attending: NURSE PRACTITIONER
Payer: MEDICARE

## 2024-10-16 ENCOUNTER — OFFICE VISIT (OUTPATIENT)
Dept: HEMATOLOGY/ONCOLOGY | Facility: CLINIC | Age: 69
End: 2024-10-16
Payer: MEDICARE

## 2024-10-16 VITALS
DIASTOLIC BLOOD PRESSURE: 57 MMHG | HEIGHT: 68 IN | TEMPERATURE: 98 F | OXYGEN SATURATION: 97 % | WEIGHT: 191 LBS | HEART RATE: 78 BPM | RESPIRATION RATE: 20 BRPM | BODY MASS INDEX: 28.95 KG/M2 | SYSTOLIC BLOOD PRESSURE: 112 MMHG

## 2024-10-16 DIAGNOSIS — D70.1 LEUKOPENIA DUE TO ANTINEOPLASTIC CHEMOTHERAPY: ICD-10-CM

## 2024-10-16 DIAGNOSIS — C79.51 METASTASIS TO BONE: ICD-10-CM

## 2024-10-16 DIAGNOSIS — N18.32 ANEMIA IN STAGE 3B CHRONIC KIDNEY DISEASE: ICD-10-CM

## 2024-10-16 DIAGNOSIS — C61 PROSTATE CANCER: Primary | ICD-10-CM

## 2024-10-16 DIAGNOSIS — D84.821 IMMUNODEFICIENCY DUE TO DRUGS: ICD-10-CM

## 2024-10-16 DIAGNOSIS — C61 PROSTATE CANCER: ICD-10-CM

## 2024-10-16 DIAGNOSIS — T45.1X5A LEUKOPENIA DUE TO ANTINEOPLASTIC CHEMOTHERAPY: ICD-10-CM

## 2024-10-16 DIAGNOSIS — N18.31 ANEMIA IN STAGE 3A CHRONIC KIDNEY DISEASE: ICD-10-CM

## 2024-10-16 DIAGNOSIS — C78.7 METASTASES TO THE LIVER: ICD-10-CM

## 2024-10-16 DIAGNOSIS — D63.1 ANEMIA IN STAGE 3B CHRONIC KIDNEY DISEASE: ICD-10-CM

## 2024-10-16 DIAGNOSIS — Z79.899 IMMUNODEFICIENCY DUE TO DRUGS: ICD-10-CM

## 2024-10-16 DIAGNOSIS — D63.1 ANEMIA IN STAGE 3A CHRONIC KIDNEY DISEASE: ICD-10-CM

## 2024-10-16 PROBLEM — C22.9 MALIGNANT NEOPLASM OF LIVER: Status: ACTIVE | Noted: 2024-09-23

## 2024-10-16 LAB
ALBUMIN SERPL-MCNC: 3.7 G/DL (ref 3.4–4.8)
ALBUMIN/GLOB SERPL: 1.2 RATIO (ref 1.1–2)
ALP SERPL-CCNC: 137 UNIT/L (ref 40–150)
ALT SERPL-CCNC: 32 UNIT/L (ref 0–55)
ANION GAP SERPL CALC-SCNC: 10 MEQ/L
AST SERPL-CCNC: 31 UNIT/L (ref 5–34)
BASOPHILS # BLD AUTO: 0.03 X10(3)/MCL
BASOPHILS NFR BLD AUTO: 1.1 %
BILIRUB SERPL-MCNC: 0.7 MG/DL
BUN SERPL-MCNC: 24.5 MG/DL (ref 8.4–25.7)
CALCIUM SERPL-MCNC: 9.5 MG/DL (ref 8.8–10)
CHLORIDE SERPL-SCNC: 107 MMOL/L (ref 98–107)
CO2 SERPL-SCNC: 22 MMOL/L (ref 23–31)
CREAT SERPL-MCNC: 1.38 MG/DL (ref 0.73–1.18)
CREAT/UREA NIT SERPL: 18
EOSINOPHIL # BLD AUTO: 0.07 X10(3)/MCL (ref 0–0.9)
EOSINOPHIL NFR BLD AUTO: 2.6 %
ERYTHROCYTE [DISTWIDTH] IN BLOOD BY AUTOMATED COUNT: 13 % (ref 11.5–17)
GFR SERPLBLD CREATININE-BSD FMLA CKD-EPI: 55 ML/MIN/1.73/M2
GLOBULIN SER-MCNC: 3 GM/DL (ref 2.4–3.5)
GLUCOSE SERPL-MCNC: 125 MG/DL (ref 82–115)
HCT VFR BLD AUTO: 30.8 % (ref 42–52)
HGB BLD-MCNC: 10.2 G/DL (ref 14–18)
IMM GRANULOCYTES # BLD AUTO: 0.01 X10(3)/MCL (ref 0–0.04)
IMM GRANULOCYTES NFR BLD AUTO: 0.4 %
LYMPHOCYTES # BLD AUTO: 0.67 X10(3)/MCL (ref 0.6–4.6)
LYMPHOCYTES NFR BLD AUTO: 25 %
MCH RBC QN AUTO: 30.7 PG (ref 27–31)
MCHC RBC AUTO-ENTMCNC: 33.1 G/DL (ref 33–36)
MCV RBC AUTO: 92.8 FL (ref 80–94)
MONOCYTES # BLD AUTO: 0.24 X10(3)/MCL (ref 0.1–1.3)
MONOCYTES NFR BLD AUTO: 9 %
NEUTROPHILS # BLD AUTO: 1.66 X10(3)/MCL (ref 2.1–9.2)
NEUTROPHILS NFR BLD AUTO: 61.9 %
PLATELET # BLD AUTO: 167 X10(3)/MCL (ref 130–400)
PMV BLD AUTO: 10.3 FL (ref 7.4–10.4)
POTASSIUM SERPL-SCNC: 4.5 MMOL/L (ref 3.5–5.1)
PROT SERPL-MCNC: 6.7 GM/DL (ref 5.8–7.6)
RBC # BLD AUTO: 3.32 X10(6)/MCL (ref 4.7–6.1)
SODIUM SERPL-SCNC: 139 MMOL/L (ref 136–145)
WBC # BLD AUTO: 2.68 X10(3)/MCL (ref 4.5–11.5)

## 2024-10-16 PROCEDURE — 99215 OFFICE O/P EST HI 40 MIN: CPT | Mod: S$PBB,,, | Performed by: NURSE PRACTITIONER

## 2024-10-16 PROCEDURE — 99214 OFFICE O/P EST MOD 30 MIN: CPT | Mod: PBBFAC | Performed by: NURSE PRACTITIONER

## 2024-10-16 PROCEDURE — 80053 COMPREHEN METABOLIC PANEL: CPT

## 2024-10-16 PROCEDURE — 85025 COMPLETE CBC W/AUTO DIFF WBC: CPT

## 2024-10-16 PROCEDURE — 99999 PR PBB SHADOW E&M-EST. PATIENT-LVL IV: CPT | Mod: PBBFAC,,, | Performed by: NURSE PRACTITIONER

## 2024-10-16 PROCEDURE — 36415 COLL VENOUS BLD VENIPUNCTURE: CPT

## 2024-10-16 RX ORDER — PROCHLORPERAZINE EDISYLATE 5 MG/ML
5 INJECTION INTRAMUSCULAR; INTRAVENOUS ONCE AS NEEDED
OUTPATIENT
Start: 2024-10-31

## 2024-10-16 RX ORDER — EPINEPHRINE 0.3 MG/.3ML
0.3 INJECTION SUBCUTANEOUS ONCE AS NEEDED
OUTPATIENT
Start: 2024-10-31

## 2024-10-16 RX ORDER — DIPHENHYDRAMINE HYDROCHLORIDE 50 MG/ML
50 INJECTION INTRAMUSCULAR; INTRAVENOUS ONCE AS NEEDED
OUTPATIENT
Start: 2024-10-31

## 2024-10-16 RX ORDER — SODIUM CHLORIDE 0.9 % (FLUSH) 0.9 %
10 SYRINGE (ML) INJECTION
OUTPATIENT
Start: 2024-10-31

## 2024-10-16 RX ORDER — HEPARIN 100 UNIT/ML
500 SYRINGE INTRAVENOUS
OUTPATIENT
Start: 2024-10-31

## 2024-10-30 ENCOUNTER — LAB VISIT (OUTPATIENT)
Dept: LAB | Facility: HOSPITAL | Age: 69
End: 2024-10-30
Attending: NURSE PRACTITIONER
Payer: MEDICARE

## 2024-10-30 DIAGNOSIS — N18.32 ANEMIA IN STAGE 3B CHRONIC KIDNEY DISEASE: ICD-10-CM

## 2024-10-30 DIAGNOSIS — D63.1 ANEMIA IN STAGE 3B CHRONIC KIDNEY DISEASE: ICD-10-CM

## 2024-10-30 DIAGNOSIS — C61 PROSTATE CANCER: ICD-10-CM

## 2024-10-30 DIAGNOSIS — D70.1 LEUKOPENIA DUE TO ANTINEOPLASTIC CHEMOTHERAPY: ICD-10-CM

## 2024-10-30 DIAGNOSIS — T45.1X5A LEUKOPENIA DUE TO ANTINEOPLASTIC CHEMOTHERAPY: ICD-10-CM

## 2024-10-30 DIAGNOSIS — D84.821 IMMUNODEFICIENCY DUE TO DRUGS: ICD-10-CM

## 2024-10-30 DIAGNOSIS — C79.51 METASTASIS TO BONE: ICD-10-CM

## 2024-10-30 DIAGNOSIS — Z79.899 IMMUNODEFICIENCY DUE TO DRUGS: ICD-10-CM

## 2024-10-30 DIAGNOSIS — C78.7 METASTASES TO THE LIVER: ICD-10-CM

## 2024-10-30 LAB
ALBUMIN SERPL-MCNC: 3.3 G/DL (ref 3.4–4.8)
ALBUMIN/GLOB SERPL: 1 RATIO (ref 1.1–2)
ALP SERPL-CCNC: 104 UNIT/L (ref 40–150)
ALT SERPL-CCNC: 34 UNIT/L (ref 0–55)
ANION GAP SERPL CALC-SCNC: 7 MEQ/L
AST SERPL-CCNC: 34 UNIT/L (ref 5–34)
BASOPHILS # BLD AUTO: 0.04 X10(3)/MCL
BASOPHILS NFR BLD AUTO: 0.7 %
BILIRUB SERPL-MCNC: 0.4 MG/DL
BUN SERPL-MCNC: 24.7 MG/DL (ref 8.4–25.7)
CALCIUM SERPL-MCNC: 9.3 MG/DL (ref 8.8–10)
CHLORIDE SERPL-SCNC: 109 MMOL/L (ref 98–107)
CO2 SERPL-SCNC: 22 MMOL/L (ref 23–31)
CREAT SERPL-MCNC: 1.37 MG/DL (ref 0.72–1.25)
CREAT/UREA NIT SERPL: 18
EOSINOPHIL # BLD AUTO: 0 X10(3)/MCL (ref 0–0.9)
EOSINOPHIL NFR BLD AUTO: 0 %
ERYTHROCYTE [DISTWIDTH] IN BLOOD BY AUTOMATED COUNT: 14.2 % (ref 11.5–17)
FREE/TOTAL PSA (OLG): 0.2
GFR SERPLBLD CREATININE-BSD FMLA CKD-EPI: 55 ML/MIN/1.73/M2
GLOBULIN SER-MCNC: 3.3 GM/DL (ref 2.4–3.5)
GLUCOSE SERPL-MCNC: 110 MG/DL (ref 82–115)
HCT VFR BLD AUTO: 28.8 % (ref 42–52)
HGB BLD-MCNC: 9.2 G/DL (ref 14–18)
IMM GRANULOCYTES # BLD AUTO: 0.03 X10(3)/MCL (ref 0–0.04)
IMM GRANULOCYTES NFR BLD AUTO: 0.5 %
LYMPHOCYTES # BLD AUTO: 0.52 X10(3)/MCL (ref 0.6–4.6)
LYMPHOCYTES NFR BLD AUTO: 9 %
MCH RBC QN AUTO: 30.6 PG (ref 27–31)
MCHC RBC AUTO-ENTMCNC: 31.9 G/DL (ref 33–36)
MCV RBC AUTO: 95.7 FL (ref 80–94)
MONOCYTES # BLD AUTO: 0.49 X10(3)/MCL (ref 0.1–1.3)
MONOCYTES NFR BLD AUTO: 8.5 %
NEUTROPHILS # BLD AUTO: 4.71 X10(3)/MCL (ref 2.1–9.2)
NEUTROPHILS NFR BLD AUTO: 81.3 %
PLATELET # BLD AUTO: 183 X10(3)/MCL (ref 130–400)
PMV BLD AUTO: 9.6 FL (ref 7.4–10.4)
POTASSIUM SERPL-SCNC: 4.5 MMOL/L (ref 3.5–5.1)
PROT SERPL-MCNC: 6.6 GM/DL (ref 5.8–7.6)
PSA FREE MFR SERPL: 20 %
PSA FREE SERPL-MCNC: 0.17 NG/ML
PSA SERPL-MCNC: 0.85 NG/ML
RBC # BLD AUTO: 3.01 X10(6)/MCL (ref 4.7–6.1)
SODIUM SERPL-SCNC: 138 MMOL/L (ref 136–145)
WBC # BLD AUTO: 5.79 X10(3)/MCL (ref 4.5–11.5)

## 2024-10-30 PROCEDURE — 80053 COMPREHEN METABOLIC PANEL: CPT

## 2024-10-30 PROCEDURE — 84154 ASSAY OF PSA FREE: CPT

## 2024-10-30 PROCEDURE — 36415 COLL VENOUS BLD VENIPUNCTURE: CPT

## 2024-10-30 PROCEDURE — 85025 COMPLETE CBC W/AUTO DIFF WBC: CPT

## 2024-10-31 ENCOUNTER — INFUSION (OUTPATIENT)
Dept: INFUSION THERAPY | Facility: HOSPITAL | Age: 69
End: 2024-10-31
Attending: INTERNAL MEDICINE
Payer: MEDICARE

## 2024-10-31 VITALS
HEIGHT: 68 IN | WEIGHT: 191.13 LBS | DIASTOLIC BLOOD PRESSURE: 65 MMHG | RESPIRATION RATE: 16 BRPM | SYSTOLIC BLOOD PRESSURE: 144 MMHG | HEART RATE: 73 BPM | BODY MASS INDEX: 28.97 KG/M2 | TEMPERATURE: 98 F | OXYGEN SATURATION: 100 %

## 2024-10-31 DIAGNOSIS — C61 PROSTATE CANCER: Primary | ICD-10-CM

## 2024-10-31 PROCEDURE — 96377 APPLICATON ON-BODY INJECTOR: CPT

## 2024-10-31 PROCEDURE — 96367 TX/PROPH/DG ADDL SEQ IV INF: CPT

## 2024-10-31 PROCEDURE — 25000003 PHARM REV CODE 250: Performed by: NURSE PRACTITIONER

## 2024-10-31 PROCEDURE — A4216 STERILE WATER/SALINE, 10 ML: HCPCS | Performed by: NURSE PRACTITIONER

## 2024-10-31 PROCEDURE — 96413 CHEMO IV INFUSION 1 HR: CPT

## 2024-10-31 PROCEDURE — 63600175 PHARM REV CODE 636 W HCPCS: Performed by: NURSE PRACTITIONER

## 2024-10-31 RX ORDER — HEPARIN 100 UNIT/ML
500 SYRINGE INTRAVENOUS
Status: DISCONTINUED | OUTPATIENT
Start: 2024-10-31 | End: 2024-10-31 | Stop reason: HOSPADM

## 2024-10-31 RX ORDER — DIPHENHYDRAMINE HYDROCHLORIDE 50 MG/ML
50 INJECTION INTRAMUSCULAR; INTRAVENOUS ONCE AS NEEDED
Status: DISCONTINUED | OUTPATIENT
Start: 2024-10-31 | End: 2024-10-31 | Stop reason: HOSPADM

## 2024-10-31 RX ORDER — EPINEPHRINE 0.3 MG/.3ML
0.3 INJECTION SUBCUTANEOUS ONCE AS NEEDED
Status: DISCONTINUED | OUTPATIENT
Start: 2024-10-31 | End: 2024-10-31 | Stop reason: HOSPADM

## 2024-10-31 RX ORDER — SODIUM CHLORIDE 0.9 % (FLUSH) 0.9 %
10 SYRINGE (ML) INJECTION
Status: DISCONTINUED | OUTPATIENT
Start: 2024-10-31 | End: 2024-10-31 | Stop reason: HOSPADM

## 2024-10-31 RX ORDER — PROCHLORPERAZINE EDISYLATE 5 MG/ML
5 INJECTION INTRAMUSCULAR; INTRAVENOUS ONCE AS NEEDED
Status: DISCONTINUED | OUTPATIENT
Start: 2024-10-31 | End: 2024-10-31 | Stop reason: HOSPADM

## 2024-10-31 RX ADMIN — DEXAMETHASONE SODIUM PHOSPHATE 20 MG: 4 INJECTION, SOLUTION INTRA-ARTICULAR; INTRALESIONAL; INTRAMUSCULAR; INTRAVENOUS; SOFT TISSUE at 01:10

## 2024-10-31 RX ADMIN — PEGFILGRASTIM 6 MG: KIT SUBCUTANEOUS at 02:10

## 2024-10-31 RX ADMIN — HEPARIN 500 UNITS: 100 SYRINGE at 02:10

## 2024-10-31 RX ADMIN — Medication 10 ML: at 02:10

## 2024-10-31 RX ADMIN — SODIUM CHLORIDE 1000 ML: 9 INJECTION, SOLUTION INTRAVENOUS at 12:10

## 2024-10-31 RX ADMIN — DOCETAXEL 120 MG: 20 INJECTION, SOLUTION, CONCENTRATE INTRAVENOUS at 01:10

## 2024-11-01 NOTE — PROGRESS NOTES
Subjective:       Patient ID: Juan Mayo is a 69 y.o. male.    PCP: Dr. Dalton Ang  Urology: Dr. Luis Worthy    1. Recurrence Stage IV--2024  H/o Prostate Cancer Stage IIA ([id number])--Diagnosed   Biopsy/pathology:  Prostate biopsy 2018--10/12 cores positive for adenocarcinoma, [place] 7.  CT-guided liver biopsy done 24--metastatic carcinoma c/w prostate primary site. CARIS with +Genomic LOUANN high, AR positive 2+ 100%, TMPRSS2 pathogenic fusion, TP53 pathogenic variant [place] 7, AKT1 pathogenic variant, APC pathogenic variant, MYC amplified, BRAF negative, CLARITA, NTRK fusion negative, RET negative, TMB low, SREEKANTH negative, BRCA1/2 negative (Clinical trials listed on CARIS).  Guardant liquid biopsy done 24--[id number] mutation (drug Capivasertib FDA-approved for breast cancer), TP53 mutation, APC mutation, MYC amplification, EGFR amplification.   Imagin. NM Bone Scan 18 at [facility]--small focus of increased activity overlies posterior right 10th rib, more intense focus of asymmetric activity right of midline T1 segment laterally, most typical degenerative origin, no findings elsewhere.  2. Prostate MRI w/ and w/o contrast [facility] 18--biopsy-proven high volume disease largely obscured by extensive biopsy-related hemorrhage, highest volume of disease right apex, no gross extracapsular disease, no seminal vesicle invasion or neurovascular bundle involvement, no skeletal or ranjeet metastases.  3. PSMA PET/CT on 24--Low level uptake within prominent pelvic lymph nodes suspicious for metastatic disease. Activity above baseline involving the cervical, thoracic and lumbar spine as well as the right ischial tuberosity compatible with osseous metastatic disease. Innumerable photopenic, hypodense hepatic lesions suspicious for metastatic disease. Correlate with MR.  Pulmonary emphysema. Emphysema on CT is an independent risk factor for lung cancer. Low-dose cancer screening to be considered in the future, if  patient does not already enrolled in such a program.   4. MRI abdomen w&w/o contrast on 8/29/24--Numerous liver metastases noted.  Greater than 30 lesions seen, appear roughly larger and more numerous compared to prior scan in 7/2024, index lesion measured in the right posterolateral aspect of the right hepatic lobe measuring 3.8 cm, gallbladder and biliary ductal system normal, pancreas normal, possible periaortic lymph node on the left measuring 1.2 cm, numerous metastatic liver lesions.  Lesions appear roughly larger and more numerous compared to a prior noncontrast CT exam from 7/19/24, possible single lymph node metastasis left periaortic region.     2. Anemia--Since 2019    Genetic testing:  Ambry done and negative.     Work-up:  2/2020--retic 1.4.  2/2021--iron saturation 22%, ferritin 260, vitamin B12 678.  3/2021--LDH normal, Haptoglobin normal, retic 1.9 (low-normal), folic acid normal, iron saturation 25%, ferritin 200, Radha negative, TSH normal 0.81, peripheral smear with normocytic anemia w/o anisocytosis.  6/2021--SPEP no M-spike, MAY negative, RF normal.    PSA:  01/2021--0.09, testosterone 199 (normal 343-1275).  08/27/24--113.73  09/17/24--30.63  10/02/24--4.74  10/30/24--0.85    Procedures:  Mediport placement by Dr. Victor Hugo Gutierrez on 10/7/24.     Treatment history:  1. Prostate radiation 9/25/18--11/27/18.  2. Lupron/ADT X 1 year--completed in 7/2019.     Treatment plan:  Eligard 45mg subQ every 6 months started 8/27/24.   Darolutamide 600mg PO BID started on 9/8/24.   Taxotere every 3 weeks x 6 cycles. Neulasta given. Started on 9/19/24.   Cycle 4 due on 11/21/24. IV fluids added.     Chief Complaint: Other Misc (Pt reports no concerns today.)    HPI   Patient presents for a treatment visit for his recurrent prostate cancer.  He completed his 3rd cycle of Taxotere last week. Reports some worsening fatigue, dry scaly rash on his hands, but otherwise is doing fairly well. Continues on the Daralutamide  as well.   We discussed now adding XGEVA for his bone metastases. He did have a recent dental exam with no problems with his teeth. He does not need any extractions.     Past Medical History:   Diagnosis Date    Anemia, unspecified     Bilateral carotid artery stenosis     Essential (primary) hypertension     Fatigue     Prostate cancer     Urinary retention     Vitamin D deficiency       Review of patient's allergies indicates:  No Known Allergies   Current Outpatient Medications on File Prior to Visit   Medication Sig Dispense Refill    aspirin 81 mg Cap Take 81 mg by mouth.      atorvastatin (LIPITOR) 20 MG tablet Take 1 tablet (20 mg total) by mouth every evening. 30 tablet 11    cholecalciferol, vitamin D3, (VITAMIN D3) 25 mcg (1,000 unit) capsule Take 25 mcg by mouth.      darolutamide 300 mg Tab Take 300 mg by mouth 2 (two) times daily.      fenofibrate 160 MG Tab Take 1 tablet (160 mg total) by mouth once daily. 30 tablet 11    folic acid (FOLVITE) 1 MG tablet Take 1 tablet (1 mg total) by mouth once daily. 90 tablet 3    multivitamin (MULTIPLE VITAMIN ORAL) Take 1 capsule by mouth once daily.      NIFEdipine (PROCARDIA-XL) 30 MG (OSM) 24 hr tablet Take 1 tablet (30 mg total) by mouth every morning. 30 tablet 11    ondansetron (ZOFRAN-ODT) 8 MG TbDL Take 1 tablet (8 mg total) by mouth every 8 (eight) hours as needed (nausea). 30 tablet 1    prochlorperazine (COMPAZINE) 5 MG tablet Take 1 tablet (5 mg total) by mouth every 6 (six) hours as needed for Nausea. 20 tablet 5    ramipriL (ALTACE) 10 MG capsule Take 10 mg by mouth once daily.      tamsulosin (FLOMAX) 0.4 mg Cap Take 0.4 mg by mouth once daily.      [DISCONTINUED] ramipriL (ALTACE) 5 MG capsule Take 1 tablet by mouth daily. 30 capsule 11     No current facility-administered medications on file prior to visit.      Review of Systems   Constitutional:  Negative for appetite change and unexpected weight change.   HENT:  Negative for mouth sores.     Eyes:  Negative for visual disturbance.   Respiratory:  Negative for cough and shortness of breath.    Cardiovascular:  Negative for chest pain.   Gastrointestinal:  Negative for abdominal pain and diarrhea.   Genitourinary:  Negative for frequency.   Musculoskeletal:  Negative for back pain.   Integumentary:  Positive for rash.   Neurological:  Negative for headaches.   Hematological:  Negative for adenopathy.   Psychiatric/Behavioral:  The patient is not nervous/anxious.      Wt Readings from Last 3 Encounters:   11/06/24 1100 86.7 kg (191 lb 1.6 oz)   10/31/24 1239 86.7 kg (191 lb 1.6 oz)   10/16/24 1250 86.6 kg (191 lb)     Physical Exam  Constitutional:       General: He is awake.      Appearance: Normal appearance. He is normal weight.   HENT:      Head: Normocephalic.   Eyes:      General: Lids are normal. Vision grossly intact.      Extraocular Movements: Extraocular movements intact.      Conjunctiva/sclera: Conjunctivae normal.   Cardiovascular:      Rate and Rhythm: Normal rate and regular rhythm.      Pulses: Normal pulses.      Heart sounds: Normal heart sounds.   Pulmonary:      Effort: Pulmonary effort is normal.      Breath sounds: Normal breath sounds and air entry.   Chest:      Comments: Left CW mediport intact, mild bruising noted.   Abdominal:      General: Abdomen is flat. Bowel sounds are normal.      Palpations: Abdomen is soft.   Musculoskeletal:      Cervical back: Normal range of motion and neck supple.   Lymphadenopathy:      Comments: No palpable adenopathy   Skin:     General: Skin is warm and moist.      Comments: Erythematous scaly rash on hands, in between thumb and pointer finger   Neurological:      General: No focal deficit present.      Mental Status: He is alert and oriented to person, place, and time.   Psychiatric:         Attention and Perception: Attention normal.         Mood and Affect: Mood normal.         Speech: Speech normal.         Behavior: Behavior is  cooperative.         Thought Content: Thought content normal.         Cognition and Memory: Cognition normal.         Judgment: Judgment normal.        Lab Visit on 11/06/2024   Component Date Value    WBC 11/06/2024 3.35 (L)     RBC 11/06/2024 2.89 (L)     Hgb 11/06/2024 9.0 (L)     Hct 11/06/2024 27.2 (L)     MCV 11/06/2024 94.1 (H)     MCH 11/06/2024 31.1 (H)     MCHC 11/06/2024 33.1     RDW 11/06/2024 14.3     Platelet 11/06/2024 191     MPV 11/06/2024 9.9     Neut % 11/06/2024 65.6     Lymph % 11/06/2024 15.8     Mono % 11/06/2024 9.0     Eos % 11/06/2024 1.2     Basophil % 11/06/2024 1.2     Lymph # 11/06/2024 0.53 (L)     Neut # 11/06/2024 2.20     Mono # 11/06/2024 0.30     Eos # 11/06/2024 0.04     Baso # 11/06/2024 0.04     IG# 11/06/2024 0.24 (H)     IG% 11/06/2024 7.2       Assessment:       1. Prostate cancer    2. Malignant neoplasm of liver, unspecified liver malignancy type    3. Metastasis to bone            Plan:       Patient was following with me for a mild anemia.  H/o Prostate cancer in 2018, diagnosed with recurrence 7/2024 liver and bone metastases seen on PSMA PET done for elevated PSA.    Patient underwent liver biopsy on 8/27/24, pathology c/w metastatic prostate cancer.  Started on Eligard 8/27/24 with urology Dr. Worthy and Darolutamide ordered, but patient has not received yet.  Per Dr. Tobias, genetic testing negative and liquid Guardant also done, will obtain results.  Dr. Tobias also ordering CARIS for liver biopsy and there were no results with therapy associations, though there were some clinical trials listed.     Recommend treatment with triplet therapy ADT with Docetaxel plus Darolutamide given multi-organ involvement/high volume metastases per [facility].   Dr. Zamora added Taxotere chemotherapy to his current regimen. Plan for 6 cycles at 60mg/m2 given his age.  Darolutamide started on 9/8/24.       Cycle 1 Taxotere started on 9/19/24. Tolerated well. Neulasta was given.  Completed  Cycle 3 last week. IV fluids added.      CBC today shows worsening anemia with Hgb of 9.0 g/dL. Mild leukopenia with ANC 2200. CMP good.   PSA trending down, now 0.85.   No labs next week.  Will schedule labs on 11/20/24 and treatment on 11/21/24.  Will also start XGEVA on 11/21/24. Patient just had a dental exam that was normal.     Follow-up in 3 weeks with labs. Will obtain anemia work-up on RTC. Continue with IV fluids if available.    Triamcinolone cream prescribed today PRN for Taxotere rash on hands, and he was instructed to keep well moisturized.     Plan to repeat PSMA PET after chemotherapy is done and will monitor PSA in between.      Previously discussed with patient and his wife incurable nature of is cancer and palliative goals of treatment.    All questions answered at this time.      Rhianna Zamora MD

## 2024-11-06 ENCOUNTER — LAB VISIT (OUTPATIENT)
Dept: LAB | Facility: HOSPITAL | Age: 69
End: 2024-11-06
Attending: NURSE PRACTITIONER
Payer: MEDICARE

## 2024-11-06 ENCOUNTER — OFFICE VISIT (OUTPATIENT)
Dept: HEMATOLOGY/ONCOLOGY | Facility: CLINIC | Age: 69
End: 2024-11-06
Payer: MEDICARE

## 2024-11-06 VITALS
RESPIRATION RATE: 14 BRPM | DIASTOLIC BLOOD PRESSURE: 69 MMHG | BODY MASS INDEX: 28.97 KG/M2 | OXYGEN SATURATION: 99 % | HEART RATE: 70 BPM | SYSTOLIC BLOOD PRESSURE: 126 MMHG | TEMPERATURE: 98 F | HEIGHT: 68 IN | WEIGHT: 191.13 LBS

## 2024-11-06 DIAGNOSIS — C79.51 METASTASIS TO BONE: ICD-10-CM

## 2024-11-06 DIAGNOSIS — D70.1 LEUKOPENIA DUE TO ANTINEOPLASTIC CHEMOTHERAPY: ICD-10-CM

## 2024-11-06 DIAGNOSIS — N18.32 ANEMIA IN STAGE 3B CHRONIC KIDNEY DISEASE: ICD-10-CM

## 2024-11-06 DIAGNOSIS — D63.1 ANEMIA IN STAGE 3B CHRONIC KIDNEY DISEASE: ICD-10-CM

## 2024-11-06 DIAGNOSIS — C61 PROSTATE CANCER: ICD-10-CM

## 2024-11-06 DIAGNOSIS — E55.9 VITAMIN D DEFICIENCY, UNSPECIFIED: ICD-10-CM

## 2024-11-06 DIAGNOSIS — C61 PROSTATE CANCER: Primary | ICD-10-CM

## 2024-11-06 DIAGNOSIS — T45.1X5A LEUKOPENIA DUE TO ANTINEOPLASTIC CHEMOTHERAPY: ICD-10-CM

## 2024-11-06 DIAGNOSIS — C22.9 MALIGNANT NEOPLASM OF LIVER, UNSPECIFIED LIVER MALIGNANCY TYPE: ICD-10-CM

## 2024-11-06 DIAGNOSIS — C78.7 METASTASES TO THE LIVER: ICD-10-CM

## 2024-11-06 DIAGNOSIS — Z79.899 IMMUNODEFICIENCY DUE TO DRUGS: ICD-10-CM

## 2024-11-06 DIAGNOSIS — D84.821 IMMUNODEFICIENCY DUE TO DRUGS: ICD-10-CM

## 2024-11-06 LAB
ALBUMIN SERPL-MCNC: 3.4 G/DL (ref 3.4–4.8)
ALBUMIN/GLOB SERPL: 1 RATIO (ref 1.1–2)
ALP SERPL-CCNC: 117 UNIT/L (ref 40–150)
ALT SERPL-CCNC: 32 UNIT/L (ref 0–55)
ANION GAP SERPL CALC-SCNC: 8 MEQ/L
AST SERPL-CCNC: 31 UNIT/L (ref 5–34)
BASOPHILS # BLD AUTO: 0.04 X10(3)/MCL
BASOPHILS NFR BLD AUTO: 1.2 %
BILIRUB SERPL-MCNC: 0.6 MG/DL
BUN SERPL-MCNC: 20.2 MG/DL (ref 8.4–25.7)
CALCIUM SERPL-MCNC: 9.5 MG/DL (ref 8.8–10)
CHLORIDE SERPL-SCNC: 110 MMOL/L (ref 98–107)
CO2 SERPL-SCNC: 21 MMOL/L (ref 23–31)
CREAT SERPL-MCNC: 1.16 MG/DL (ref 0.72–1.25)
CREAT/UREA NIT SERPL: 17
EOSINOPHIL # BLD AUTO: 0.04 X10(3)/MCL (ref 0–0.9)
EOSINOPHIL NFR BLD AUTO: 1.2 %
ERYTHROCYTE [DISTWIDTH] IN BLOOD BY AUTOMATED COUNT: 14.3 % (ref 11.5–17)
GFR SERPLBLD CREATININE-BSD FMLA CKD-EPI: >60 ML/MIN/1.73/M2
GLOBULIN SER-MCNC: 3.4 GM/DL (ref 2.4–3.5)
GLUCOSE SERPL-MCNC: 105 MG/DL (ref 82–115)
HCT VFR BLD AUTO: 27.2 % (ref 42–52)
HGB BLD-MCNC: 9 G/DL (ref 14–18)
IMM GRANULOCYTES # BLD AUTO: 0.24 X10(3)/MCL (ref 0–0.04)
IMM GRANULOCYTES NFR BLD AUTO: 7.2 %
LYMPHOCYTES # BLD AUTO: 0.53 X10(3)/MCL (ref 0.6–4.6)
LYMPHOCYTES NFR BLD AUTO: 15.8 %
MCH RBC QN AUTO: 31.1 PG (ref 27–31)
MCHC RBC AUTO-ENTMCNC: 33.1 G/DL (ref 33–36)
MCV RBC AUTO: 94.1 FL (ref 80–94)
MONOCYTES # BLD AUTO: 0.3 X10(3)/MCL (ref 0.1–1.3)
MONOCYTES NFR BLD AUTO: 9 %
NEUTROPHILS # BLD AUTO: 2.2 X10(3)/MCL (ref 2.1–9.2)
NEUTROPHILS NFR BLD AUTO: 65.6 %
PLATELET # BLD AUTO: 191 X10(3)/MCL (ref 130–400)
PMV BLD AUTO: 9.9 FL (ref 7.4–10.4)
POTASSIUM SERPL-SCNC: 4.2 MMOL/L (ref 3.5–5.1)
PROT SERPL-MCNC: 6.8 GM/DL (ref 5.8–7.6)
RBC # BLD AUTO: 2.89 X10(6)/MCL (ref 4.7–6.1)
SODIUM SERPL-SCNC: 139 MMOL/L (ref 136–145)
WBC # BLD AUTO: 3.35 X10(3)/MCL (ref 4.5–11.5)

## 2024-11-06 PROCEDURE — 36415 COLL VENOUS BLD VENIPUNCTURE: CPT

## 2024-11-06 PROCEDURE — 80053 COMPREHEN METABOLIC PANEL: CPT

## 2024-11-06 PROCEDURE — 99214 OFFICE O/P EST MOD 30 MIN: CPT | Mod: PBBFAC | Performed by: INTERNAL MEDICINE

## 2024-11-06 PROCEDURE — 99999 PR PBB SHADOW E&M-EST. PATIENT-LVL IV: CPT | Mod: PBBFAC,,, | Performed by: INTERNAL MEDICINE

## 2024-11-06 PROCEDURE — 99215 OFFICE O/P EST HI 40 MIN: CPT | Mod: S$PBB,,, | Performed by: INTERNAL MEDICINE

## 2024-11-06 PROCEDURE — 85025 COMPLETE CBC W/AUTO DIFF WBC: CPT

## 2024-11-06 RX ORDER — TRIAMCINOLONE ACETONIDE 1 MG/G
CREAM TOPICAL 2 TIMES DAILY
Qty: 80 G | Refills: 1 | Status: SHIPPED | OUTPATIENT
Start: 2024-11-06

## 2024-11-06 RX ORDER — RAMIPRIL 10 MG/1
10 CAPSULE ORAL DAILY
COMMUNITY

## 2024-11-13 ENCOUNTER — LAB VISIT (OUTPATIENT)
Dept: LAB | Facility: HOSPITAL | Age: 69
End: 2024-11-13
Attending: NURSE PRACTITIONER
Payer: MEDICARE

## 2024-11-13 DIAGNOSIS — T45.1X5A LEUKOPENIA DUE TO ANTINEOPLASTIC CHEMOTHERAPY: ICD-10-CM

## 2024-11-13 DIAGNOSIS — C78.7 METASTASES TO THE LIVER: ICD-10-CM

## 2024-11-13 DIAGNOSIS — C61 PROSTATE CANCER: ICD-10-CM

## 2024-11-13 DIAGNOSIS — N18.32 ANEMIA IN STAGE 3B CHRONIC KIDNEY DISEASE: ICD-10-CM

## 2024-11-13 DIAGNOSIS — C79.51 METASTASIS TO BONE: ICD-10-CM

## 2024-11-13 DIAGNOSIS — Z79.899 IMMUNODEFICIENCY DUE TO DRUGS: ICD-10-CM

## 2024-11-13 DIAGNOSIS — D63.1 ANEMIA IN STAGE 3B CHRONIC KIDNEY DISEASE: ICD-10-CM

## 2024-11-13 DIAGNOSIS — D84.821 IMMUNODEFICIENCY DUE TO DRUGS: ICD-10-CM

## 2024-11-13 DIAGNOSIS — D70.1 LEUKOPENIA DUE TO ANTINEOPLASTIC CHEMOTHERAPY: ICD-10-CM

## 2024-11-13 LAB
ALBUMIN SERPL-MCNC: 3.6 G/DL (ref 3.4–4.8)
ALBUMIN/GLOB SERPL: 1 RATIO (ref 1.1–2)
ALP SERPL-CCNC: 136 UNIT/L (ref 40–150)
ALT SERPL-CCNC: 34 UNIT/L (ref 0–55)
ANION GAP SERPL CALC-SCNC: 6 MEQ/L
AST SERPL-CCNC: 37 UNIT/L (ref 5–34)
BASOPHILS # BLD AUTO: 0.04 X10(3)/MCL
BASOPHILS NFR BLD AUTO: 0.3 %
BILIRUB SERPL-MCNC: 0.4 MG/DL
BUN SERPL-MCNC: 23.2 MG/DL (ref 8.4–25.7)
CALCIUM SERPL-MCNC: 9.9 MG/DL (ref 8.8–10)
CHLORIDE SERPL-SCNC: 108 MMOL/L (ref 98–107)
CO2 SERPL-SCNC: 24 MMOL/L (ref 23–31)
CREAT SERPL-MCNC: 1.29 MG/DL (ref 0.72–1.25)
CREAT/UREA NIT SERPL: 18
EOSINOPHIL # BLD AUTO: 0.03 X10(3)/MCL (ref 0–0.9)
EOSINOPHIL NFR BLD AUTO: 0.2 %
ERYTHROCYTE [DISTWIDTH] IN BLOOD BY AUTOMATED COUNT: 15 % (ref 11.5–17)
GFR SERPLBLD CREATININE-BSD FMLA CKD-EPI: 59 ML/MIN/1.73/M2
GLOBULIN SER-MCNC: 3.6 GM/DL (ref 2.4–3.5)
GLUCOSE SERPL-MCNC: 102 MG/DL (ref 82–115)
HCT VFR BLD AUTO: 30.2 % (ref 42–52)
HGB BLD-MCNC: 9.8 G/DL (ref 14–18)
IMM GRANULOCYTES # BLD AUTO: 0.27 X10(3)/MCL (ref 0–0.04)
IMM GRANULOCYTES NFR BLD AUTO: 2.1 %
LYMPHOCYTES # BLD AUTO: 0.75 X10(3)/MCL (ref 0.6–4.6)
LYMPHOCYTES NFR BLD AUTO: 5.8 %
MCH RBC QN AUTO: 30.7 PG (ref 27–31)
MCHC RBC AUTO-ENTMCNC: 32.5 G/DL (ref 33–36)
MCV RBC AUTO: 94.7 FL (ref 80–94)
MONOCYTES # BLD AUTO: 0.59 X10(3)/MCL (ref 0.1–1.3)
MONOCYTES NFR BLD AUTO: 4.6 %
NEUTROPHILS # BLD AUTO: 11.26 X10(3)/MCL (ref 2.1–9.2)
NEUTROPHILS NFR BLD AUTO: 87 %
PLATELET # BLD AUTO: 180 X10(3)/MCL (ref 130–400)
PMV BLD AUTO: 9.3 FL (ref 7.4–10.4)
POTASSIUM SERPL-SCNC: 4.6 MMOL/L (ref 3.5–5.1)
PROT SERPL-MCNC: 7.2 GM/DL (ref 5.8–7.6)
RBC # BLD AUTO: 3.19 X10(6)/MCL (ref 4.7–6.1)
SODIUM SERPL-SCNC: 138 MMOL/L (ref 136–145)
WBC # BLD AUTO: 12.94 X10(3)/MCL (ref 4.5–11.5)

## 2024-11-13 PROCEDURE — 36415 COLL VENOUS BLD VENIPUNCTURE: CPT

## 2024-11-13 PROCEDURE — 85025 COMPLETE CBC W/AUTO DIFF WBC: CPT

## 2024-11-13 PROCEDURE — 80053 COMPREHEN METABOLIC PANEL: CPT

## 2024-11-18 RX ORDER — DIPHENHYDRAMINE HYDROCHLORIDE 50 MG/ML
50 INJECTION INTRAMUSCULAR; INTRAVENOUS ONCE AS NEEDED
Status: CANCELLED | OUTPATIENT
Start: 2024-11-21

## 2024-11-18 RX ORDER — EPINEPHRINE 0.3 MG/.3ML
0.3 INJECTION SUBCUTANEOUS ONCE AS NEEDED
Status: CANCELLED | OUTPATIENT
Start: 2024-11-21

## 2024-11-18 RX ORDER — SODIUM CHLORIDE 0.9 % (FLUSH) 0.9 %
10 SYRINGE (ML) INJECTION
Status: CANCELLED | OUTPATIENT
Start: 2024-11-21

## 2024-11-18 RX ORDER — HEPARIN 100 UNIT/ML
500 SYRINGE INTRAVENOUS
Status: CANCELLED | OUTPATIENT
Start: 2024-11-21

## 2024-11-18 RX ORDER — PROCHLORPERAZINE EDISYLATE 5 MG/ML
5 INJECTION INTRAMUSCULAR; INTRAVENOUS ONCE AS NEEDED
Status: CANCELLED | OUTPATIENT
Start: 2024-11-21

## 2024-11-19 DIAGNOSIS — C61 PROSTATE CANCER: Primary | ICD-10-CM

## 2024-11-20 ENCOUNTER — LAB VISIT (OUTPATIENT)
Dept: LAB | Facility: HOSPITAL | Age: 69
End: 2024-11-20
Attending: NURSE PRACTITIONER
Payer: MEDICARE

## 2024-11-20 DIAGNOSIS — C61 PROSTATE CANCER: ICD-10-CM

## 2024-11-20 PROBLEM — C78.7 METASTASES TO THE LIVER: Status: ACTIVE | Noted: 2024-11-20

## 2024-11-20 LAB
ALBUMIN SERPL-MCNC: 3.6 G/DL (ref 3.4–4.8)
ALBUMIN/GLOB SERPL: 1.1 RATIO (ref 1.1–2)
ALP SERPL-CCNC: 95 UNIT/L (ref 40–150)
ALT SERPL-CCNC: 45 UNIT/L (ref 0–55)
ANION GAP SERPL CALC-SCNC: 6 MEQ/L
AST SERPL-CCNC: 42 UNIT/L (ref 5–34)
BASOPHILS # BLD AUTO: 0.03 X10(3)/MCL
BASOPHILS NFR BLD AUTO: 0.6 %
BILIRUB SERPL-MCNC: 0.4 MG/DL
BUN SERPL-MCNC: 21.7 MG/DL (ref 8.4–25.7)
CALCIUM SERPL-MCNC: 10 MG/DL (ref 8.8–10)
CHLORIDE SERPL-SCNC: 109 MMOL/L (ref 98–107)
CO2 SERPL-SCNC: 25 MMOL/L (ref 23–31)
CREAT SERPL-MCNC: 1.21 MG/DL (ref 0.72–1.25)
CREAT/UREA NIT SERPL: 18
EOSINOPHIL # BLD AUTO: 0.01 X10(3)/MCL (ref 0–0.9)
EOSINOPHIL NFR BLD AUTO: 0.2 %
ERYTHROCYTE [DISTWIDTH] IN BLOOD BY AUTOMATED COUNT: 15.1 % (ref 11.5–17)
GFR SERPLBLD CREATININE-BSD FMLA CKD-EPI: >60 ML/MIN/1.73/M2
GLOBULIN SER-MCNC: 3.3 GM/DL (ref 2.4–3.5)
GLUCOSE SERPL-MCNC: 98 MG/DL (ref 82–115)
HCT VFR BLD AUTO: 30 % (ref 42–52)
HGB BLD-MCNC: 9.6 G/DL (ref 14–18)
IMM GRANULOCYTES # BLD AUTO: 0.02 X10(3)/MCL (ref 0–0.04)
IMM GRANULOCYTES NFR BLD AUTO: 0.4 %
LYMPHOCYTES # BLD AUTO: 0.54 X10(3)/MCL (ref 0.6–4.6)
LYMPHOCYTES NFR BLD AUTO: 11.4 %
MCH RBC QN AUTO: 30.5 PG (ref 27–31)
MCHC RBC AUTO-ENTMCNC: 32 G/DL (ref 33–36)
MCV RBC AUTO: 95.2 FL (ref 80–94)
MONOCYTES # BLD AUTO: 0.58 X10(3)/MCL (ref 0.1–1.3)
MONOCYTES NFR BLD AUTO: 12.3 %
NEUTROPHILS # BLD AUTO: 3.54 X10(3)/MCL (ref 2.1–9.2)
NEUTROPHILS NFR BLD AUTO: 75.1 %
PLATELET # BLD AUTO: 166 X10(3)/MCL (ref 130–400)
PMV BLD AUTO: 9.5 FL (ref 7.4–10.4)
POTASSIUM SERPL-SCNC: 4.5 MMOL/L (ref 3.5–5.1)
PROT SERPL-MCNC: 6.9 GM/DL (ref 5.8–7.6)
RBC # BLD AUTO: 3.15 X10(6)/MCL (ref 4.7–6.1)
SODIUM SERPL-SCNC: 140 MMOL/L (ref 136–145)
WBC # BLD AUTO: 4.72 X10(3)/MCL (ref 4.5–11.5)

## 2024-11-20 PROCEDURE — 36415 COLL VENOUS BLD VENIPUNCTURE: CPT

## 2024-11-20 PROCEDURE — 85025 COMPLETE CBC W/AUTO DIFF WBC: CPT

## 2024-11-20 PROCEDURE — 80053 COMPREHEN METABOLIC PANEL: CPT

## 2024-11-20 NOTE — PROGRESS NOTES
Subjective:       Patient ID: Juan Mayo is a 69 y.o. male.    PCP: Dr. Dalton Ang  Urology: Dr. Luis Worthy    1. Recurrence Stage IV--2024  H/o Prostate Cancer Stage IIA ([id number])--Diagnosed   Biopsy/pathology:  Prostate biopsy 2018--10/12 cores positive for adenocarcinoma, [place] 7.  CT-guided liver biopsy done 24--metastatic carcinoma c/w prostate primary site. CARIS with +Genomic LOUANN high, AR positive 2+ 100%, TMPRSS2 pathogenic fusion, TP53 pathogenic variant Exon 7, AKT1 pathogenic variant, APC pathogenic variant, MYC amplified, BRAF negative, CLARITA, NTRK fusion negative, RET negative, TMB low, SREEKANTH negative, BRCA1/2 negative (Clinical trials listed on CARIS).  Guardant liquid biopsy done 24--[id number] mutation (drug Capivasertib FDA-approved for breast cancer), TP53 mutation, APC mutation, MYC amplification, EGFR amplification.   Imagin. NM Bone Scan 18 at [facility]--small focus of increased activity overlies posterior right 10th rib, more intense focus of asymmetric activity right of midline T1 segment laterally, most typical degenerative origin, no findings elsewhere.  2. Prostate MRI w/ and w/o contrast [facility] 18--biopsy-proven high volume disease largely obscured by extensive biopsy-related hemorrhage, highest volume of disease right apex, no gross extracapsular disease, no seminal vesicle invasion or neurovascular bundle involvement, no skeletal or ranjeet metastases.  3. PSMA PET/CT on 24--Low level uptake within prominent pelvic lymph nodes suspicious for metastatic disease. Activity above baseline involving the cervical, thoracic and lumbar spine as well as the right ischial tuberosity compatible with osseous metastatic disease. Innumerable photopenic, hypodense hepatic lesions suspicious for metastatic disease. Correlate with MR.  Pulmonary emphysema. Emphysema on CT is an independent risk factor for lung cancer. Low-dose cancer screening to be considered in the future, if  patient does not already enrolled in such a program.   4. MRI abdomen w&w/o contrast on 8/29/24--Numerous liver metastases noted.  Greater than 30 lesions seen, appear roughly larger and more numerous compared to prior scan in 7/2024, index lesion measured in the right posterolateral aspect of the right hepatic lobe measuring 3.8 cm, gallbladder and biliary ductal system normal, pancreas normal, possible periaortic lymph node on the left measuring 1.2 cm, numerous metastatic liver lesions.  Lesions appear roughly larger and more numerous compared to a prior noncontrast CT exam from 7/19/24, possible single lymph node metastasis left periaortic region.     2. Anemia--Since 2019    Genetic testing:  Ambry done and negative.     Work-up:  2/2020--retic 1.4.  2/2021--iron saturation 22%, ferritin 260, vitamin B12 678.  3/2021--LDH normal, Haptoglobin normal, retic 1.9 (low-normal), folic acid normal, iron saturation 25%, ferritin 200, Radha negative, TSH normal 0.81, peripheral smear with normocytic anemia w/o anisocytosis.  6/2021--SPEP no M-spike, MYA negative, RF normal.    PSA:  01/2021--0.09, testosterone 199 (normal 343-1275).  08/27/24--113.73  09/17/24--30.63  10/02/24--4.74  10/30/24--0.85    Procedures:  Mediport placement by Dr. Victor Hugo Gutierrez on 10/7/24.     Treatment history:  1. Prostate radiation 9/25/18--11/27/18.  2. Lupron/ADT X 1 year--completed in 7/2019.     Treatment plan:  Eligard 45mg subQ every 6 months started 8/27/24.   Darolutamide 600mg PO BID started on 9/8/24.   Taxotere every 3 weeks x 6 cycles. Neulasta given. Started on 9/19/24.   Cycle 5 due on 12/12/24. IV fluids.   4.   Xgeva monthly. Started on 11/21/24.     Chief Complaint: Other Misc (Pt reports no concerns today.)    HPI   Patient presents for a treatment visit for his recurrent prostate cancer.  He completed his 4th cycle of Taxotere last week. Xgeva also added last week. Reports some worsening fatigue but otherwise is doing fairly  well. States he did not recover as quickly after this recent cycle. Still has no energy and has to rest frequently. No other new side effects. Still with dry skin and mild rash to bilateral hands. Continues on the Daralutamide as well.     Past Medical History:   Diagnosis Date    Anemia, unspecified     Bilateral carotid artery stenosis     Essential (primary) hypertension     Fatigue     Prostate cancer     Urinary retention     Vitamin D deficiency       Review of patient's allergies indicates:  No Known Allergies   Current Outpatient Medications on File Prior to Visit   Medication Sig Dispense Refill    aspirin 81 mg Cap Take 81 mg by mouth.      atorvastatin (LIPITOR) 20 MG tablet Take 1 tablet (20 mg total) by mouth every evening. 30 tablet 11    cholecalciferol, vitamin D3, (VITAMIN D3) 25 mcg (1,000 unit) capsule Take 25 mcg by mouth.      darolutamide 300 mg Tab Take 300 mg by mouth 2 (two) times daily.      fenofibrate 160 MG Tab Take 1 tablet (160 mg total) by mouth once daily. 30 tablet 11    folic acid (FOLVITE) 1 MG tablet Take 1 tablet (1 mg total) by mouth once daily. 90 tablet 3    multivitamin (MULTIPLE VITAMIN ORAL) Take 1 capsule by mouth once daily.      NIFEdipine (PROCARDIA-XL) 30 MG (OSM) 24 hr tablet Take 1 tablet (30 mg total) by mouth every morning. 30 tablet 11    ondansetron (ZOFRAN-ODT) 8 MG TbDL Take 1 tablet (8 mg total) by mouth every 8 (eight) hours as needed (nausea). 30 tablet 1    prochlorperazine (COMPAZINE) 5 MG tablet Take 1 tablet (5 mg total) by mouth every 6 (six) hours as needed for Nausea. 20 tablet 5    ramipriL (ALTACE) 10 MG capsule Take 10 mg by mouth once daily.      tamsulosin (FLOMAX) 0.4 mg Cap Take 0.4 mg by mouth once daily.      triamcinolone acetonide 0.1% (KENALOG) 0.1 % cream Apply topically 2 (two) times daily. 80 g 1     No current facility-administered medications on file prior to visit.      Review of Systems   Constitutional:  Positive for fatigue.  Negative for appetite change and unexpected weight change.   HENT:  Negative for mouth sores.    Eyes:  Negative for visual disturbance.   Respiratory:  Negative for cough and shortness of breath.    Cardiovascular:  Negative for chest pain.   Gastrointestinal:  Negative for abdominal pain and diarrhea.   Genitourinary:  Negative for frequency.   Musculoskeletal:  Negative for back pain.   Integumentary:  Negative for rash.        Dry skin to hands   Neurological:  Negative for headaches.   Hematological:  Negative for adenopathy.   Psychiatric/Behavioral:  The patient is not nervous/anxious.      Wt Readings from Last 3 Encounters:   11/27/24 0834 85.8 kg (189 lb 1.6 oz)   11/21/24 1248 86.8 kg (191 lb 6.4 oz)   11/06/24 1100 86.7 kg (191 lb 1.6 oz)     Physical Exam  Constitutional:       General: He is awake.      Appearance: Normal appearance. He is normal weight.   HENT:      Head: Normocephalic.   Eyes:      General: Lids are normal. Vision grossly intact.      Extraocular Movements: Extraocular movements intact.      Conjunctiva/sclera: Conjunctivae normal.   Cardiovascular:      Rate and Rhythm: Normal rate and regular rhythm.      Pulses: Normal pulses.      Heart sounds: Normal heart sounds.   Pulmonary:      Effort: Pulmonary effort is normal.      Breath sounds: Normal breath sounds and air entry.   Chest:      Comments: Left CW mediport intact  Abdominal:      General: Abdomen is flat. Bowel sounds are normal.      Palpations: Abdomen is soft.   Musculoskeletal:      Cervical back: Normal range of motion and neck supple.   Lymphadenopathy:      Comments: No palpable adenopathy   Skin:     General: Skin is warm and moist.      Comments: Erythematous scaly rash on hands, in between thumb and pointer finger   Neurological:      General: No focal deficit present.      Mental Status: He is alert and oriented to person, place, and time.   Psychiatric:         Attention and Perception: Attention normal.          Mood and Affect: Mood normal.         Speech: Speech normal.         Behavior: Behavior is cooperative.         Thought Content: Thought content normal.         Cognition and Memory: Cognition normal.         Judgment: Judgment normal.        Lab Visit on 11/27/2024   Component Date Value    WBC 11/27/2024 2.74 (L)     RBC 11/27/2024 2.82 (L)     Hgb 11/27/2024 8.8 (L)     Hct 11/27/2024 27.5 (L)     MCV 11/27/2024 97.5 (H)     MCH 11/27/2024 31.2 (H)     MCHC 11/27/2024 32.0 (L)     RDW 11/27/2024 15.7     Platelet 11/27/2024 153     MPV 11/27/2024 10.1     Neut % 11/27/2024 70.1     Lymph % 11/27/2024 16.1     Mono % 11/27/2024 9.1     Eos % 11/27/2024 2.9     Basophil % 11/27/2024 1.1     Lymph # 11/27/2024 0.44 (L)     Neut # 11/27/2024 1.92 (L)     Mono # 11/27/2024 0.25     Eos # 11/27/2024 0.08     Baso # 11/27/2024 0.03     IG# 11/27/2024 0.02     IG% 11/27/2024 0.7       Assessment:       1. Prostate cancer    2. Anemia in stage 3a chronic kidney disease    3. Immunodeficiency due to drugs    4. Stage 3a chronic kidney disease    5. Metastasis to bone    6. Leukopenia due to antineoplastic chemotherapy    7. Metastases to the liver          Plan:       Patient was following with me for a mild anemia.  H/o Prostate cancer in 2018, diagnosed with recurrence 7/2024 liver and bone metastases seen on PSMA PET done for elevated PSA.    Patient underwent liver biopsy on 8/27/24, pathology c/w metastatic prostate cancer.  Started on Eligard 8/27/24 with urology Dr. Worthy and Darolutamide ordered, but patient has not received yet.  Per Dr. Tobias, genetic testing negative and liquid Guardant also done, will obtain results.  Dr. Tobias also ordering CARIS for liver biopsy and there were no results with therapy associations, though there were some clinical trials listed.     Recommend treatment with triplet therapy ADT with Docetaxel plus Darolutamide given multi-organ involvement/high volume metastases per NCCN.     Noah added Taxotere chemotherapy to his current regimen. Plan for 6 cycles at 60mg/m2 given his age.  Darolutamide started on 9/8/24.       Cycle 1 Taxotere started on 9/19/24. Tolerated well. Neulasta was given.  Completed Cycle 4 last week. IV fluids added.   CBC today shows worsening anemia with Hgb of 8.8 g/dL. Mild leukopenia with ANC 1920. CMP and PSA still pending.   Anemia workup obtained today, will follow-up results.   PSA trending down, now 0.85.   No labs next week.  Will schedule labs on 12/11/24 and treatment on 12/12/24, Cycle 5.   XGEVA monthly started on 11/21/24. Patient just had a dental exam that was normal.   Follow-up in 3 weeks with labs. Continue with IV fluids if available.  Plan to repeat PSMA PET after chemotherapy is done and will monitor PSA in between.    Continue Triamcinolone cream PRN for Taxotere rash on hands, and he was instructed to keep well moisturized.     Previously discussed with patient and his wife incurable nature of is cancer and palliative goals of treatment.    All questions answered at this time.      Alden Obando, PC

## 2024-11-21 ENCOUNTER — INFUSION (OUTPATIENT)
Dept: INFUSION THERAPY | Facility: HOSPITAL | Age: 69
End: 2024-11-21
Attending: INTERNAL MEDICINE
Payer: MEDICARE

## 2024-11-21 VITALS
TEMPERATURE: 98 F | RESPIRATION RATE: 16 BRPM | BODY MASS INDEX: 29.01 KG/M2 | HEIGHT: 68 IN | OXYGEN SATURATION: 97 % | SYSTOLIC BLOOD PRESSURE: 147 MMHG | WEIGHT: 191.38 LBS | DIASTOLIC BLOOD PRESSURE: 63 MMHG | HEART RATE: 70 BPM

## 2024-11-21 DIAGNOSIS — C79.51 METASTASIS TO BONE: ICD-10-CM

## 2024-11-21 DIAGNOSIS — C61 PROSTATE CANCER: Primary | ICD-10-CM

## 2024-11-21 PROCEDURE — 96413 CHEMO IV INFUSION 1 HR: CPT

## 2024-11-21 PROCEDURE — 96377 APPLICATON ON-BODY INJECTOR: CPT

## 2024-11-21 PROCEDURE — 96372 THER/PROPH/DIAG INJ SC/IM: CPT | Mod: 59

## 2024-11-21 PROCEDURE — 63600175 PHARM REV CODE 636 W HCPCS: Mod: JZ,JG | Performed by: NURSE PRACTITIONER

## 2024-11-21 PROCEDURE — 96367 TX/PROPH/DG ADDL SEQ IV INF: CPT

## 2024-11-21 PROCEDURE — 63600175 PHARM REV CODE 636 W HCPCS: Performed by: INTERNAL MEDICINE

## 2024-11-21 PROCEDURE — 25000003 PHARM REV CODE 250: Performed by: INTERNAL MEDICINE

## 2024-11-21 PROCEDURE — A4216 STERILE WATER/SALINE, 10 ML: HCPCS | Performed by: INTERNAL MEDICINE

## 2024-11-21 RX ORDER — DIPHENHYDRAMINE HYDROCHLORIDE 50 MG/ML
50 INJECTION INTRAMUSCULAR; INTRAVENOUS ONCE AS NEEDED
Status: DISCONTINUED | OUTPATIENT
Start: 2024-11-21 | End: 2024-11-21 | Stop reason: HOSPADM

## 2024-11-21 RX ORDER — SODIUM CHLORIDE 0.9 % (FLUSH) 0.9 %
10 SYRINGE (ML) INJECTION
Status: DISCONTINUED | OUTPATIENT
Start: 2024-11-21 | End: 2024-11-21 | Stop reason: HOSPADM

## 2024-11-21 RX ORDER — EPINEPHRINE 0.3 MG/.3ML
0.3 INJECTION SUBCUTANEOUS ONCE AS NEEDED
Status: DISCONTINUED | OUTPATIENT
Start: 2024-11-21 | End: 2024-11-21 | Stop reason: HOSPADM

## 2024-11-21 RX ORDER — HEPARIN 100 UNIT/ML
500 SYRINGE INTRAVENOUS
Status: DISCONTINUED | OUTPATIENT
Start: 2024-11-21 | End: 2024-11-21 | Stop reason: HOSPADM

## 2024-11-21 RX ORDER — PROCHLORPERAZINE EDISYLATE 5 MG/ML
5 INJECTION INTRAMUSCULAR; INTRAVENOUS ONCE AS NEEDED
Status: DISCONTINUED | OUTPATIENT
Start: 2024-11-21 | End: 2024-11-21 | Stop reason: HOSPADM

## 2024-11-21 RX ADMIN — PEGFILGRASTIM 6 MG: KIT SUBCUTANEOUS at 02:11

## 2024-11-21 RX ADMIN — SODIUM CHLORIDE: 9 INJECTION, SOLUTION INTRAVENOUS at 01:11

## 2024-11-21 RX ADMIN — Medication 10 ML: at 02:11

## 2024-11-21 RX ADMIN — DOCETAXEL 120 MG: 20 INJECTION, SOLUTION, CONCENTRATE INTRAVENOUS at 01:11

## 2024-11-21 RX ADMIN — SODIUM CHLORIDE 1000 ML: 9 INJECTION, SOLUTION INTRAVENOUS at 01:11

## 2024-11-21 RX ADMIN — DENOSUMAB 120 MG: 120 INJECTION SUBCUTANEOUS at 01:11

## 2024-11-21 RX ADMIN — HEPARIN 500 UNITS: 100 SYRINGE at 02:11

## 2024-11-21 RX ADMIN — SODIUM CHLORIDE 20 MG: 9 INJECTION, SOLUTION INTRAVENOUS at 01:11

## 2024-11-21 NOTE — PLAN OF CARE
Plan of care reviewed with the patient. Patient denies any invasive dental work in the last 3 months and does not have any scheduled for next three months.  C4 D1 Taxotere/1L NS//Neulasta and 1st Xgeva given, tolerated well. Discharged in stable condition and is aware of future appts.

## 2024-11-27 ENCOUNTER — OFFICE VISIT (OUTPATIENT)
Dept: HEMATOLOGY/ONCOLOGY | Facility: CLINIC | Age: 69
End: 2024-11-27
Payer: MEDICARE

## 2024-11-27 ENCOUNTER — LAB VISIT (OUTPATIENT)
Dept: LAB | Facility: HOSPITAL | Age: 69
End: 2024-11-27
Attending: NURSE PRACTITIONER
Payer: MEDICARE

## 2024-11-27 VITALS
BODY MASS INDEX: 28.66 KG/M2 | SYSTOLIC BLOOD PRESSURE: 146 MMHG | WEIGHT: 189.13 LBS | DIASTOLIC BLOOD PRESSURE: 68 MMHG | TEMPERATURE: 98 F | HEIGHT: 68 IN | RESPIRATION RATE: 14 BRPM | OXYGEN SATURATION: 100 % | HEART RATE: 85 BPM

## 2024-11-27 DIAGNOSIS — N18.31 STAGE 3A CHRONIC KIDNEY DISEASE: ICD-10-CM

## 2024-11-27 DIAGNOSIS — C61 PROSTATE CANCER: ICD-10-CM

## 2024-11-27 DIAGNOSIS — Z79.899 IMMUNODEFICIENCY DUE TO DRUGS: ICD-10-CM

## 2024-11-27 DIAGNOSIS — C79.51 METASTASIS TO BONE: ICD-10-CM

## 2024-11-27 DIAGNOSIS — D70.1 LEUKOPENIA DUE TO ANTINEOPLASTIC CHEMOTHERAPY: ICD-10-CM

## 2024-11-27 DIAGNOSIS — D84.821 IMMUNODEFICIENCY DUE TO DRUGS: ICD-10-CM

## 2024-11-27 DIAGNOSIS — E55.9 VITAMIN D DEFICIENCY, UNSPECIFIED: ICD-10-CM

## 2024-11-27 DIAGNOSIS — T45.1X5A LEUKOPENIA DUE TO ANTINEOPLASTIC CHEMOTHERAPY: ICD-10-CM

## 2024-11-27 DIAGNOSIS — D63.1 ANEMIA IN STAGE 3A CHRONIC KIDNEY DISEASE: ICD-10-CM

## 2024-11-27 DIAGNOSIS — C61 PROSTATE CANCER: Primary | ICD-10-CM

## 2024-11-27 DIAGNOSIS — C22.9 MALIGNANT NEOPLASM OF LIVER, UNSPECIFIED LIVER MALIGNANCY TYPE: ICD-10-CM

## 2024-11-27 DIAGNOSIS — N18.31 ANEMIA IN STAGE 3A CHRONIC KIDNEY DISEASE: ICD-10-CM

## 2024-11-27 DIAGNOSIS — C78.7 METASTASES TO THE LIVER: ICD-10-CM

## 2024-11-27 LAB
25(OH)D3+25(OH)D2 SERPL-MCNC: 42 NG/ML (ref 30–80)
ALBUMIN SERPL-MCNC: 3.5 G/DL (ref 3.4–4.8)
ALBUMIN/GLOB SERPL: 1 RATIO (ref 1.1–2)
ALP SERPL-CCNC: 123 UNIT/L (ref 40–150)
ALT SERPL-CCNC: 41 UNIT/L (ref 0–55)
ANION GAP SERPL CALC-SCNC: 7 MEQ/L
AST SERPL-CCNC: 38 UNIT/L (ref 5–34)
BASOPHILS # BLD AUTO: 0.03 X10(3)/MCL
BASOPHILS NFR BLD AUTO: 1.1 %
BILIRUB SERPL-MCNC: 0.7 MG/DL
BUN SERPL-MCNC: 20.6 MG/DL (ref 8.4–25.7)
CALCIUM SERPL-MCNC: 10.1 MG/DL (ref 8.8–10)
CHLORIDE SERPL-SCNC: 109 MMOL/L (ref 98–107)
CO2 SERPL-SCNC: 25 MMOL/L (ref 23–31)
CREAT SERPL-MCNC: 1.3 MG/DL (ref 0.72–1.25)
CREAT/UREA NIT SERPL: 16
EOSINOPHIL # BLD AUTO: 0.08 X10(3)/MCL (ref 0–0.9)
EOSINOPHIL NFR BLD AUTO: 2.9 %
ERYTHROCYTE [DISTWIDTH] IN BLOOD BY AUTOMATED COUNT: 15.7 % (ref 11.5–17)
FERRITIN SERPL-MCNC: 2046.61 NG/ML (ref 21.81–274.66)
FOLATE SERPL-MCNC: 16.4 NG/ML (ref 7–31.4)
GFR SERPLBLD CREATININE-BSD FMLA CKD-EPI: 59 ML/MIN/1.73/M2
GLOBULIN SER-MCNC: 3.5 GM/DL (ref 2.4–3.5)
GLUCOSE SERPL-MCNC: 133 MG/DL (ref 82–115)
HCT VFR BLD AUTO: 27.5 % (ref 42–52)
HGB BLD-MCNC: 8.8 G/DL (ref 14–18)
IMM GRANULOCYTES # BLD AUTO: 0.02 X10(3)/MCL (ref 0–0.04)
IMM GRANULOCYTES NFR BLD AUTO: 0.7 %
IRON SATN MFR SERPL: 10 % (ref 20–50)
IRON SERPL-MCNC: 30 UG/DL (ref 65–175)
LYMPHOCYTES # BLD AUTO: 0.44 X10(3)/MCL (ref 0.6–4.6)
LYMPHOCYTES NFR BLD AUTO: 16.1 %
MCH RBC QN AUTO: 31.2 PG (ref 27–31)
MCHC RBC AUTO-ENTMCNC: 32 G/DL (ref 33–36)
MCV RBC AUTO: 97.5 FL (ref 80–94)
MONOCYTES # BLD AUTO: 0.25 X10(3)/MCL (ref 0.1–1.3)
MONOCYTES NFR BLD AUTO: 9.1 %
NEUTROPHILS # BLD AUTO: 1.92 X10(3)/MCL (ref 2.1–9.2)
NEUTROPHILS NFR BLD AUTO: 70.1 %
PLATELET # BLD AUTO: 153 X10(3)/MCL (ref 130–400)
PMV BLD AUTO: 10.1 FL (ref 7.4–10.4)
POTASSIUM SERPL-SCNC: 4.5 MMOL/L (ref 3.5–5.1)
PROT SERPL-MCNC: 7 GM/DL (ref 5.8–7.6)
PSA SERPL-MCNC: 0.29 NG/ML
RBC # BLD AUTO: 2.82 X10(6)/MCL (ref 4.7–6.1)
SODIUM SERPL-SCNC: 141 MMOL/L (ref 136–145)
TIBC SERPL-MCNC: 262 UG/DL (ref 60–240)
TIBC SERPL-MCNC: 292 UG/DL (ref 250–450)
TRANSFERRIN SERPL-MCNC: 259 MG/DL (ref 163–344)
VIT B12 SERPL-MCNC: >2000 PG/ML (ref 213–816)
WBC # BLD AUTO: 2.74 X10(3)/MCL (ref 4.5–11.5)

## 2024-11-27 PROCEDURE — 82746 ASSAY OF FOLIC ACID SERUM: CPT

## 2024-11-27 PROCEDURE — 83550 IRON BINDING TEST: CPT

## 2024-11-27 PROCEDURE — 36415 COLL VENOUS BLD VENIPUNCTURE: CPT

## 2024-11-27 PROCEDURE — 84153 ASSAY OF PSA TOTAL: CPT

## 2024-11-27 PROCEDURE — 99215 OFFICE O/P EST HI 40 MIN: CPT | Mod: S$PBB,,, | Performed by: NURSE PRACTITIONER

## 2024-11-27 PROCEDURE — 82607 VITAMIN B-12: CPT

## 2024-11-27 PROCEDURE — 80053 COMPREHEN METABOLIC PANEL: CPT

## 2024-11-27 PROCEDURE — 99999 PR PBB SHADOW E&M-EST. PATIENT-LVL IV: CPT | Mod: PBBFAC,,, | Performed by: NURSE PRACTITIONER

## 2024-11-27 PROCEDURE — 85025 COMPLETE CBC W/AUTO DIFF WBC: CPT

## 2024-11-27 PROCEDURE — 99214 OFFICE O/P EST MOD 30 MIN: CPT | Mod: PBBFAC | Performed by: NURSE PRACTITIONER

## 2024-11-27 PROCEDURE — 82306 VITAMIN D 25 HYDROXY: CPT

## 2024-11-27 PROCEDURE — 82728 ASSAY OF FERRITIN: CPT

## 2024-12-11 ENCOUNTER — LAB VISIT (OUTPATIENT)
Dept: LAB | Facility: HOSPITAL | Age: 69
End: 2024-12-11
Attending: NURSE PRACTITIONER
Payer: MEDICARE

## 2024-12-11 DIAGNOSIS — C61 PROSTATE CANCER: ICD-10-CM

## 2024-12-11 LAB
ALBUMIN SERPL-MCNC: 3.5 G/DL (ref 3.4–4.8)
ALBUMIN/GLOB SERPL: 1.1 RATIO (ref 1.1–2)
ALP SERPL-CCNC: 71 UNIT/L (ref 40–150)
ALT SERPL-CCNC: 19 UNIT/L (ref 0–55)
ANION GAP SERPL CALC-SCNC: 7 MEQ/L
AST SERPL-CCNC: 23 UNIT/L (ref 5–34)
BASOPHILS # BLD AUTO: 0.03 X10(3)/MCL
BASOPHILS NFR BLD AUTO: 0.8 %
BILIRUB SERPL-MCNC: 0.4 MG/DL
BUN SERPL-MCNC: 18.8 MG/DL (ref 8.4–25.7)
CALCIUM SERPL-MCNC: 9.5 MG/DL (ref 8.8–10)
CHLORIDE SERPL-SCNC: 109 MMOL/L (ref 98–107)
CO2 SERPL-SCNC: 24 MMOL/L (ref 23–31)
CREAT SERPL-MCNC: 1.18 MG/DL (ref 0.72–1.25)
CREAT/UREA NIT SERPL: 16
EOSINOPHIL # BLD AUTO: 0.01 X10(3)/MCL (ref 0–0.9)
EOSINOPHIL NFR BLD AUTO: 0.3 %
ERYTHROCYTE [DISTWIDTH] IN BLOOD BY AUTOMATED COUNT: 15.7 % (ref 11.5–17)
GFR SERPLBLD CREATININE-BSD FMLA CKD-EPI: >60 ML/MIN/1.73/M2
GLOBULIN SER-MCNC: 3.3 GM/DL (ref 2.4–3.5)
GLUCOSE SERPL-MCNC: 100 MG/DL (ref 82–115)
HCT VFR BLD AUTO: 28.5 % (ref 42–52)
HGB BLD-MCNC: 9 G/DL (ref 14–18)
IMM GRANULOCYTES # BLD AUTO: 0.02 X10(3)/MCL (ref 0–0.04)
IMM GRANULOCYTES NFR BLD AUTO: 0.6 %
LYMPHOCYTES # BLD AUTO: 0.43 X10(3)/MCL (ref 0.6–4.6)
LYMPHOCYTES NFR BLD AUTO: 12.1 %
MCH RBC QN AUTO: 30.6 PG (ref 27–31)
MCHC RBC AUTO-ENTMCNC: 31.6 G/DL (ref 33–36)
MCV RBC AUTO: 96.9 FL (ref 80–94)
MONOCYTES # BLD AUTO: 0.37 X10(3)/MCL (ref 0.1–1.3)
MONOCYTES NFR BLD AUTO: 10.4 %
NEUTROPHILS # BLD AUTO: 2.7 X10(3)/MCL (ref 2.1–9.2)
NEUTROPHILS NFR BLD AUTO: 75.8 %
PLATELET # BLD AUTO: 193 X10(3)/MCL (ref 130–400)
PMV BLD AUTO: 9.8 FL (ref 7.4–10.4)
POTASSIUM SERPL-SCNC: 4.3 MMOL/L (ref 3.5–5.1)
PROT SERPL-MCNC: 6.8 GM/DL (ref 5.8–7.6)
RBC # BLD AUTO: 2.94 X10(6)/MCL (ref 4.7–6.1)
SODIUM SERPL-SCNC: 140 MMOL/L (ref 136–145)
WBC # BLD AUTO: 3.56 X10(3)/MCL (ref 4.5–11.5)

## 2024-12-11 PROCEDURE — 36415 COLL VENOUS BLD VENIPUNCTURE: CPT

## 2024-12-11 PROCEDURE — 80053 COMPREHEN METABOLIC PANEL: CPT

## 2024-12-11 PROCEDURE — 85025 COMPLETE CBC W/AUTO DIFF WBC: CPT

## 2024-12-11 RX ORDER — PROCHLORPERAZINE EDISYLATE 5 MG/ML
5 INJECTION INTRAMUSCULAR; INTRAVENOUS ONCE AS NEEDED
Status: CANCELLED | OUTPATIENT
Start: 2024-12-12

## 2024-12-11 RX ORDER — HEPARIN 100 UNIT/ML
500 SYRINGE INTRAVENOUS
Status: CANCELLED | OUTPATIENT
Start: 2024-12-12

## 2024-12-11 RX ORDER — DIPHENHYDRAMINE HYDROCHLORIDE 50 MG/ML
50 INJECTION INTRAMUSCULAR; INTRAVENOUS ONCE AS NEEDED
Status: CANCELLED | OUTPATIENT
Start: 2024-12-12

## 2024-12-11 RX ORDER — EPINEPHRINE 0.3 MG/.3ML
0.3 INJECTION SUBCUTANEOUS ONCE AS NEEDED
Status: CANCELLED | OUTPATIENT
Start: 2024-12-12

## 2024-12-11 RX ORDER — SODIUM CHLORIDE 0.9 % (FLUSH) 0.9 %
10 SYRINGE (ML) INJECTION
Status: CANCELLED | OUTPATIENT
Start: 2024-12-12

## 2024-12-12 ENCOUNTER — INFUSION (OUTPATIENT)
Dept: INFUSION THERAPY | Facility: HOSPITAL | Age: 69
End: 2024-12-12
Attending: INTERNAL MEDICINE
Payer: MEDICARE

## 2024-12-12 VITALS
DIASTOLIC BLOOD PRESSURE: 73 MMHG | SYSTOLIC BLOOD PRESSURE: 135 MMHG | WEIGHT: 189 LBS | HEIGHT: 68 IN | HEART RATE: 83 BPM | TEMPERATURE: 98 F | RESPIRATION RATE: 16 BRPM | BODY MASS INDEX: 28.64 KG/M2

## 2024-12-12 DIAGNOSIS — C61 PROSTATE CANCER: Primary | ICD-10-CM

## 2024-12-12 PROCEDURE — 96377 APPLICATON ON-BODY INJECTOR: CPT

## 2024-12-12 PROCEDURE — 63600175 PHARM REV CODE 636 W HCPCS: Mod: JZ,JG | Performed by: NURSE PRACTITIONER

## 2024-12-12 PROCEDURE — 96367 TX/PROPH/DG ADDL SEQ IV INF: CPT

## 2024-12-12 PROCEDURE — 96413 CHEMO IV INFUSION 1 HR: CPT

## 2024-12-12 PROCEDURE — 25000003 PHARM REV CODE 250: Performed by: NURSE PRACTITIONER

## 2024-12-12 PROCEDURE — 96361 HYDRATE IV INFUSION ADD-ON: CPT

## 2024-12-12 RX ORDER — PROCHLORPERAZINE EDISYLATE 5 MG/ML
5 INJECTION INTRAMUSCULAR; INTRAVENOUS ONCE AS NEEDED
Status: DISCONTINUED | OUTPATIENT
Start: 2024-12-12 | End: 2024-12-12 | Stop reason: HOSPADM

## 2024-12-12 RX ORDER — EPINEPHRINE 0.3 MG/.3ML
0.3 INJECTION SUBCUTANEOUS ONCE AS NEEDED
Status: DISCONTINUED | OUTPATIENT
Start: 2024-12-12 | End: 2024-12-12 | Stop reason: HOSPADM

## 2024-12-12 RX ORDER — SODIUM CHLORIDE 0.9 % (FLUSH) 0.9 %
10 SYRINGE (ML) INJECTION
Status: DISCONTINUED | OUTPATIENT
Start: 2024-12-12 | End: 2024-12-12 | Stop reason: HOSPADM

## 2024-12-12 RX ORDER — HEPARIN 100 UNIT/ML
500 SYRINGE INTRAVENOUS
Status: DISCONTINUED | OUTPATIENT
Start: 2024-12-12 | End: 2024-12-12 | Stop reason: HOSPADM

## 2024-12-12 RX ORDER — DIPHENHYDRAMINE HYDROCHLORIDE 50 MG/ML
50 INJECTION INTRAMUSCULAR; INTRAVENOUS ONCE AS NEEDED
Status: DISCONTINUED | OUTPATIENT
Start: 2024-12-12 | End: 2024-12-12 | Stop reason: HOSPADM

## 2024-12-12 RX ADMIN — SODIUM CHLORIDE 1000 ML: 9 INJECTION, SOLUTION INTRAVENOUS at 01:12

## 2024-12-12 RX ADMIN — DOCETAXEL 120 MG: 20 INJECTION, SOLUTION, CONCENTRATE INTRAVENOUS at 01:12

## 2024-12-12 RX ADMIN — SODIUM CHLORIDE 20 MG: 9 INJECTION, SOLUTION INTRAVENOUS at 01:12

## 2024-12-12 RX ADMIN — PEGFILGRASTIM 6 MG: KIT SUBCUTANEOUS at 02:12

## 2024-12-12 NOTE — PROGRESS NOTES
Subjective:       Patient ID: Juan Mayo is a 69 y.o. male.    PCP: Dr. Dalton nAg  Urology: Dr. Luis Worthy    1. Recurrence Stage IV--2024  H/o Prostate Cancer Stage IIA ([id number])--Diagnosed   Biopsy/pathology:  Prostate biopsy 2018--10/12 cores positive for adenocarcinoma, [place] 7.  CT-guided liver biopsy done 24--metastatic carcinoma c/w prostate primary site. CARIS with +Genomic LOUANN high, AR positive 2+ 100%, TMPRSS2 pathogenic fusion, TP53 pathogenic variant Exon 7, AKT1 pathogenic variant, APC pathogenic variant, MYC amplified, BRAF negative, CLARITA, NTRK fusion negative, RET negative, TMB low, SREEKANTH negative, BRCA1/2 negative (Clinical trials listed on CARIS).  Guardant liquid biopsy done 24--[id number] mutation (drug Capivasertib FDA-approved for breast cancer), TP53 mutation, APC mutation, MYC amplification, EGFR amplification.   Imagin. NM Bone Scan 18 at [facility]--small focus of increased activity overlies posterior right 10th rib, more intense focus of asymmetric activity right of midline T1 segment laterally, most typical degenerative origin, no findings elsewhere.  2. Prostate MRI w/ and w/o contrast [facility] 18--biopsy-proven high volume disease largely obscured by extensive biopsy-related hemorrhage, highest volume of disease right apex, no gross extracapsular disease, no seminal vesicle invasion or neurovascular bundle involvement, no skeletal or ranjeet metastases.  3. PSMA PET/CT on 24--Low level uptake within prominent pelvic lymph nodes suspicious for metastatic disease. Activity above baseline involving the cervical, thoracic and lumbar spine as well as the right ischial tuberosity compatible with osseous metastatic disease. Innumerable photopenic, hypodense hepatic lesions suspicious for metastatic disease. Correlate with MR.  Pulmonary emphysema. Emphysema on CT is an independent risk factor for lung cancer. Low-dose cancer screening to be considered in the future, if  patient does not already enrolled in such a program.   4. MRI abdomen w&w/o contrast on 8/29/24--Numerous liver metastases noted.  Greater than 30 lesions seen, appear roughly larger and more numerous compared to prior scan in 7/2024, index lesion measured in the right posterolateral aspect of the right hepatic lobe measuring 3.8 cm, gallbladder and biliary ductal system normal, pancreas normal, possible periaortic lymph node on the left measuring 1.2 cm, numerous metastatic liver lesions.  Lesions appear roughly larger and more numerous compared to a prior noncontrast CT exam from 7/19/24, possible single lymph node metastasis left periaortic region.     2. Anemia--Since 2019    Genetic testing:  Ambry done and negative.     Work-up:  2/2020--retic 1.4.  2/2021--iron saturation 22%, ferritin 260, vitamin B12 678.  3/2021--LDH normal, Haptoglobin normal, retic 1.9 (low-normal), folic acid normal, iron saturation 25%, ferritin 200, Radha negative, TSH normal 0.81, peripheral smear with normocytic anemia w/o anisocytosis.  6/2021--SPEP no M-spike, MAY negative, RF normal.    PSA:  01/2021--0.09, testosterone 199 (normal 343-1275).  08/27/24--113.73  09/17/24--30.63  10/02/24--4.74  10/30/24--0.85  12/17/24--0.17      Procedures:  Mediport placement by Dr. Victor Hugo Gutierrez on 10/7/24.     Treatment history:  1. Prostate radiation 9/25/18--11/27/18.  2. Lupron/ADT X 1 year--completed in 7/2019.   3. XGEVA x 1 dose given 11/21/24 (stopped since still castrate sensitive).    Treatment plan:  Eligard 45mg subQ every 6 months started 8/27/24.   Darolutamide 600mg PO BID started on 9/8/24.   Taxotere every 3 weeks x 6 cycles. Neulasta given. Started on 9/19/24.   Cycle 6 due on 1/2/25. IV fluids.     Chief Complaint: 3 wk f/u    HPI   Patient presents for a treatment visit for his recurrent prostate cancer.  He completed his 5th cycle of Taxotere last week. He continues tolerating well. He c/o ongoing fatigue, nail changes,  watering of eyes. He is otherwise doing okay. He did see Dr. Worthy and will continue receiving his Eligard at the urology office but Dr. Worthy did not want to see him back for a year.     Past Medical History:   Diagnosis Date    Anemia, unspecified     Bilateral carotid artery stenosis     Essential (primary) hypertension     Fatigue     Prostate cancer     Urinary retention     Vitamin D deficiency       Review of patient's allergies indicates:  No Known Allergies   Current Outpatient Medications on File Prior to Visit   Medication Sig Dispense Refill    aspirin 81 mg Cap Take 81 mg by mouth.      atorvastatin (LIPITOR) 20 MG tablet Take 1 tablet (20 mg total) by mouth every evening. 30 tablet 11    cholecalciferol, vitamin D3, (VITAMIN D3) 25 mcg (1,000 unit) capsule Take 25 mcg by mouth.      darolutamide 300 mg Tab Take 300 mg by mouth 2 (two) times daily.      fenofibrate 160 MG Tab Take 1 tablet (160 mg total) by mouth once daily. 30 tablet 11    folic acid (FOLVITE) 1 MG tablet Take 1 tablet (1 mg total) by mouth once daily. 90 tablet 3    multivitamin (MULTIPLE VITAMIN ORAL) Take 1 capsule by mouth once daily.      NIFEdipine (PROCARDIA-XL) 30 MG (OSM) 24 hr tablet Take 1 tablet (30 mg total) by mouth every morning. 30 tablet 11    ondansetron (ZOFRAN-ODT) 8 MG TbDL Take 1 tablet (8 mg total) by mouth every 8 (eight) hours as needed (nausea). 30 tablet 1    prochlorperazine (COMPAZINE) 5 MG tablet Take 1 tablet (5 mg total) by mouth every 6 (six) hours as needed for Nausea. 20 tablet 5    ramipriL (ALTACE) 10 MG capsule Take 10 mg by mouth once daily.      tamsulosin (FLOMAX) 0.4 mg Cap Take 0.4 mg by mouth once daily.      triamcinolone acetonide 0.1% (KENALOG) 0.1 % cream Apply topically 2 (two) times daily. 80 g 1     No current facility-administered medications on file prior to visit.      Review of Systems   Constitutional:  Positive for fatigue. Negative for appetite change and unexpected weight change.    HENT:  Negative for mouth sores.    Eyes:  Negative for visual disturbance.   Respiratory:  Negative for cough and shortness of breath.    Cardiovascular:  Negative for chest pain.   Gastrointestinal:  Negative for abdominal pain and diarrhea.   Genitourinary:  Negative for frequency.   Musculoskeletal:  Negative for back pain.   Integumentary:  Negative for rash.        Dry skin to hands   Neurological:  Negative for headaches.   Hematological:  Negative for adenopathy.   Psychiatric/Behavioral:  The patient is not nervous/anxious.      Wt Readings from Last 3 Encounters:   12/18/24 0956 84.3 kg (185 lb 14.4 oz)   12/12/24 1200 85.7 kg (189 lb)   11/27/24 0834 85.8 kg (189 lb 1.6 oz)     Physical Exam  Constitutional:       General: He is awake.      Appearance: Normal appearance. He is normal weight.   HENT:      Head: Normocephalic.   Eyes:      General: Lids are normal. Vision grossly intact.      Extraocular Movements: Extraocular movements intact.      Conjunctiva/sclera: Conjunctivae normal.   Cardiovascular:      Rate and Rhythm: Normal rate and regular rhythm.      Pulses: Normal pulses.      Heart sounds: Normal heart sounds.   Pulmonary:      Effort: Pulmonary effort is normal.      Breath sounds: Normal breath sounds and air entry.   Chest:      Comments: Left CW mediport intact  Abdominal:      General: Abdomen is flat. Bowel sounds are normal.      Palpations: Abdomen is soft.   Musculoskeletal:      Cervical back: Normal range of motion and neck supple.   Lymphadenopathy:      Comments: No palpable adenopathy   Skin:     General: Skin is warm and moist.      Comments: Erythematous scaly rash on hands, in between thumb and pointer finger   Neurological:      General: No focal deficit present.      Mental Status: He is alert and oriented to person, place, and time.   Psychiatric:         Attention and Perception: Attention normal.         Mood and Affect: Mood normal.         Speech: Speech normal.          Behavior: Behavior is cooperative.         Thought Content: Thought content normal.         Cognition and Memory: Cognition normal.         Judgment: Judgment normal.        Lab Visit on 12/17/2024   Component Date Value    Sodium 12/17/2024 140     Potassium 12/17/2024 4.4     Chloride 12/17/2024 108 (H)     CO2 12/17/2024 25     Glucose 12/17/2024 96     Blood Urea Nitrogen 12/17/2024 17.8     Creatinine 12/17/2024 1.08     Calcium 12/17/2024 9.0     Protein Total 12/17/2024 6.6     Albumin 12/17/2024 3.5     Globulin 12/17/2024 3.1     Albumin/Globulin Ratio 12/17/2024 1.1     Bilirubin Total 12/17/2024 0.7     ALP 12/17/2024 104     ALT 12/17/2024 24     AST 12/17/2024 31     eGFR 12/17/2024 >60     Anion Gap 12/17/2024 7.0     BUN/Creatinine Ratio 12/17/2024 16     Free/Total PSA 12/17/2024 <0.6     Prostate Specific Antigen 12/17/2024 0.17     Prostate Specific Antige* 12/17/2024 <0.10     PSA % Free 12/17/2024      WBC 12/17/2024 4.91     RBC 12/17/2024 2.81 (L)     Hgb 12/17/2024 8.8 (L)     Hct 12/17/2024 27.3 (L)     MCV 12/17/2024 97.2 (H)     MCH 12/17/2024 31.3 (H)     MCHC 12/17/2024 32.2 (L)     RDW 12/17/2024 16.0     Platelet 12/17/2024 143     MPV 12/17/2024 10.9 (H)     Neut % 12/17/2024 85.6     Lymph % 12/17/2024 7.9     Mono % 12/17/2024 2.4     Eos % 12/17/2024 0.6     Basophil % 12/17/2024 0.6     Lymph # 12/17/2024 0.39 (L)     Neut # 12/17/2024 4.20     Mono # 12/17/2024 0.12     Eos # 12/17/2024 0.03     Baso # 12/17/2024 0.03     IG# 12/17/2024 0.14 (H)     IG% 12/17/2024 2.9       Assessment:       1. Prostate cancer    2. Metastasis to bone    3. Metastases to the liver    4. Antineoplastic chemotherapy induced anemia      Plan:       Patient was following with me for a mild anemia.  H/o Prostate cancer in 2018, diagnosed with recurrence 7/2024 liver and bone metastases seen on PSMA PET done for elevated PSA.    Patient underwent liver biopsy on 8/27/24, pathology c/w metastatic  prostate cancer.  Started on Eligard 8/27/24 with urology Dr. Worthy and Darolutamide ordered, but patient has not received yet.  Per Dr. Tobias, genetic testing negative and liquid Guardant also done, will obtain results.  Dr. Tobias also ordering CARIS for liver biopsy and there were no results with therapy associations, though there were some clinical trials listed.     Recommend treatment with triplet therapy ADT with Docetaxel plus Darolutamide given multi-organ involvement/high volume metastases per NCCN.   Dr. Zamora added Taxotere chemotherapy to his current regimen. Plan for 6 cycles at 60mg/m2 given his age.    Darolutamide started on 9/8/24.     Patient was given 1 dose of XGEVA on 11/21/24, but stopped since he is still castrate-sensitive.     Cycle 1 Taxotere started on 9/19/24. Tolerated well. Neulasta was given.  Completed Cycle 5 last week. IV fluids have continued.      CBC today shows worsening anemia with Hgb of 8.8 g/dL, but has been up and down. WBC normal. PSA continues trending down, now 0.17.   No labs next week.  Will schedule labs on 12/31/24 and final cycle of treatment on 1/2/25, Cycle 6.    Labs only on 1/9/25.  F/u in 4 weeks labs and visit.  Repeat PSMA PET prior to his next appointment.  If all good, plan to begin monthly visits, and patient will continue on Darolutamide/Eligard.     XGEVA monthly started on 11/21/24. Stopped today since his disease is still castrate sensitive.     Continue Triamcinolone cream PRN for Taxotere rash on hands, and he was instructed to keep well moisturized.     Previously discussed with patient and his wife incurable nature of is cancer and palliative goals of treatment.    All questions answered at this time.      Rhianna Zamora MD

## 2024-12-17 ENCOUNTER — LAB VISIT (OUTPATIENT)
Dept: LAB | Facility: HOSPITAL | Age: 69
End: 2024-12-17
Attending: NURSE PRACTITIONER
Payer: MEDICARE

## 2024-12-17 DIAGNOSIS — C78.7 METASTASES TO THE LIVER: ICD-10-CM

## 2024-12-17 DIAGNOSIS — C79.51 METASTASIS TO BONE: ICD-10-CM

## 2024-12-17 DIAGNOSIS — C61 PROSTATE CANCER: ICD-10-CM

## 2024-12-17 LAB
ALBUMIN SERPL-MCNC: 3.5 G/DL (ref 3.4–4.8)
ALBUMIN/GLOB SERPL: 1.1 RATIO (ref 1.1–2)
ALP SERPL-CCNC: 104 UNIT/L (ref 40–150)
ALT SERPL-CCNC: 24 UNIT/L (ref 0–55)
ANION GAP SERPL CALC-SCNC: 7 MEQ/L
AST SERPL-CCNC: 31 UNIT/L (ref 5–34)
BASOPHILS # BLD AUTO: 0.03 X10(3)/MCL
BASOPHILS NFR BLD AUTO: 0.6 %
BILIRUB SERPL-MCNC: 0.7 MG/DL
BUN SERPL-MCNC: 17.8 MG/DL (ref 8.4–25.7)
CALCIUM SERPL-MCNC: 9 MG/DL (ref 8.8–10)
CHLORIDE SERPL-SCNC: 108 MMOL/L (ref 98–107)
CO2 SERPL-SCNC: 25 MMOL/L (ref 23–31)
CREAT SERPL-MCNC: 1.08 MG/DL (ref 0.72–1.25)
CREAT/UREA NIT SERPL: 16
EOSINOPHIL # BLD AUTO: 0.03 X10(3)/MCL (ref 0–0.9)
EOSINOPHIL NFR BLD AUTO: 0.6 %
ERYTHROCYTE [DISTWIDTH] IN BLOOD BY AUTOMATED COUNT: 16 % (ref 11.5–17)
FREE/TOTAL PSA (OLG): <0.6
GFR SERPLBLD CREATININE-BSD FMLA CKD-EPI: >60 ML/MIN/1.73/M2
GLOBULIN SER-MCNC: 3.1 GM/DL (ref 2.4–3.5)
GLUCOSE SERPL-MCNC: 96 MG/DL (ref 82–115)
HCT VFR BLD AUTO: 27.3 % (ref 42–52)
HGB BLD-MCNC: 8.8 G/DL (ref 14–18)
IMM GRANULOCYTES # BLD AUTO: 0.14 X10(3)/MCL (ref 0–0.04)
IMM GRANULOCYTES NFR BLD AUTO: 2.9 %
LYMPHOCYTES # BLD AUTO: 0.39 X10(3)/MCL (ref 0.6–4.6)
LYMPHOCYTES NFR BLD AUTO: 7.9 %
MCH RBC QN AUTO: 31.3 PG (ref 27–31)
MCHC RBC AUTO-ENTMCNC: 32.2 G/DL (ref 33–36)
MCV RBC AUTO: 97.2 FL (ref 80–94)
MONOCYTES # BLD AUTO: 0.12 X10(3)/MCL (ref 0.1–1.3)
MONOCYTES NFR BLD AUTO: 2.4 %
NEUTROPHILS # BLD AUTO: 4.2 X10(3)/MCL (ref 2.1–9.2)
NEUTROPHILS NFR BLD AUTO: 85.6 %
PLATELET # BLD AUTO: 143 X10(3)/MCL (ref 130–400)
PMV BLD AUTO: 10.9 FL (ref 7.4–10.4)
POTASSIUM SERPL-SCNC: 4.4 MMOL/L (ref 3.5–5.1)
PROT SERPL-MCNC: 6.6 GM/DL (ref 5.8–7.6)
PSA FREE MFR SERPL: NORMAL %
PSA FREE SERPL-MCNC: <0.1 NG/ML
PSA SERPL-MCNC: 0.17 NG/ML
RBC # BLD AUTO: 2.81 X10(6)/MCL (ref 4.7–6.1)
SODIUM SERPL-SCNC: 140 MMOL/L (ref 136–145)
WBC # BLD AUTO: 4.91 X10(3)/MCL (ref 4.5–11.5)

## 2024-12-17 PROCEDURE — 80053 COMPREHEN METABOLIC PANEL: CPT

## 2024-12-17 PROCEDURE — 36415 COLL VENOUS BLD VENIPUNCTURE: CPT

## 2024-12-17 PROCEDURE — 85025 COMPLETE CBC W/AUTO DIFF WBC: CPT

## 2024-12-17 PROCEDURE — 84153 ASSAY OF PSA TOTAL: CPT

## 2024-12-18 ENCOUNTER — OFFICE VISIT (OUTPATIENT)
Dept: HEMATOLOGY/ONCOLOGY | Facility: CLINIC | Age: 69
End: 2024-12-18
Payer: MEDICARE

## 2024-12-18 VITALS
HEART RATE: 82 BPM | BODY MASS INDEX: 28.17 KG/M2 | RESPIRATION RATE: 18 BRPM | TEMPERATURE: 99 F | DIASTOLIC BLOOD PRESSURE: 65 MMHG | HEIGHT: 68 IN | SYSTOLIC BLOOD PRESSURE: 132 MMHG | WEIGHT: 185.88 LBS | OXYGEN SATURATION: 99 %

## 2024-12-18 DIAGNOSIS — T45.1X5A ANTINEOPLASTIC CHEMOTHERAPY INDUCED ANEMIA: ICD-10-CM

## 2024-12-18 DIAGNOSIS — D64.81 ANTINEOPLASTIC CHEMOTHERAPY INDUCED ANEMIA: ICD-10-CM

## 2024-12-18 DIAGNOSIS — C78.7 METASTASES TO THE LIVER: ICD-10-CM

## 2024-12-18 DIAGNOSIS — C79.51 METASTASIS TO BONE: ICD-10-CM

## 2024-12-18 DIAGNOSIS — C61 PROSTATE CANCER: Primary | ICD-10-CM

## 2024-12-18 PROCEDURE — 99215 OFFICE O/P EST HI 40 MIN: CPT | Mod: PBBFAC | Performed by: INTERNAL MEDICINE

## 2024-12-18 PROCEDURE — 99999 PR PBB SHADOW E&M-EST. PATIENT-LVL V: CPT | Mod: PBBFAC,,, | Performed by: INTERNAL MEDICINE

## 2024-12-18 PROCEDURE — 99215 OFFICE O/P EST HI 40 MIN: CPT | Mod: S$PBB,,, | Performed by: INTERNAL MEDICINE

## 2024-12-31 ENCOUNTER — LAB VISIT (OUTPATIENT)
Dept: LAB | Facility: HOSPITAL | Age: 69
End: 2024-12-31
Attending: INTERNAL MEDICINE
Payer: MEDICARE

## 2024-12-31 DIAGNOSIS — C61 PROSTATE CANCER: ICD-10-CM

## 2024-12-31 LAB
ALBUMIN SERPL-MCNC: 3.6 G/DL (ref 3.4–4.8)
ALBUMIN/GLOB SERPL: 1.2 RATIO (ref 1.1–2)
ALP SERPL-CCNC: 69 UNIT/L (ref 40–150)
ALT SERPL-CCNC: 18 UNIT/L (ref 0–55)
ANION GAP SERPL CALC-SCNC: 7 MEQ/L
AST SERPL-CCNC: 27 UNIT/L (ref 5–34)
BASOPHILS # BLD AUTO: 0.03 X10(3)/MCL
BASOPHILS NFR BLD AUTO: 1 %
BILIRUB SERPL-MCNC: 0.5 MG/DL
BUN SERPL-MCNC: 19.6 MG/DL (ref 8.4–25.7)
CALCIUM SERPL-MCNC: 9.4 MG/DL (ref 8.8–10)
CHLORIDE SERPL-SCNC: 110 MMOL/L (ref 98–107)
CO2 SERPL-SCNC: 23 MMOL/L (ref 23–31)
CREAT SERPL-MCNC: 1.21 MG/DL (ref 0.72–1.25)
CREAT/UREA NIT SERPL: 16
EOSINOPHIL # BLD AUTO: 0 X10(3)/MCL (ref 0–0.9)
EOSINOPHIL NFR BLD AUTO: 0 %
ERYTHROCYTE [DISTWIDTH] IN BLOOD BY AUTOMATED COUNT: 16 % (ref 11.5–17)
GFR SERPLBLD CREATININE-BSD FMLA CKD-EPI: >60 ML/MIN/1.73/M2
GLOBULIN SER-MCNC: 3.1 GM/DL (ref 2.4–3.5)
GLUCOSE SERPL-MCNC: 96 MG/DL (ref 82–115)
HCT VFR BLD AUTO: 30.6 % (ref 42–52)
HGB BLD-MCNC: 9.4 G/DL (ref 14–18)
IMM GRANULOCYTES # BLD AUTO: 0.03 X10(3)/MCL (ref 0–0.04)
IMM GRANULOCYTES NFR BLD AUTO: 1 %
LYMPHOCYTES # BLD AUTO: 0.46 X10(3)/MCL (ref 0.6–4.6)
LYMPHOCYTES NFR BLD AUTO: 14.7 %
MCH RBC QN AUTO: 30.7 PG (ref 27–31)
MCHC RBC AUTO-ENTMCNC: 30.7 G/DL (ref 33–36)
MCV RBC AUTO: 100 FL (ref 80–94)
MONOCYTES # BLD AUTO: 0.33 X10(3)/MCL (ref 0.1–1.3)
MONOCYTES NFR BLD AUTO: 10.5 %
NEUTROPHILS # BLD AUTO: 2.28 X10(3)/MCL (ref 2.1–9.2)
NEUTROPHILS NFR BLD AUTO: 72.8 %
PLATELET # BLD AUTO: 177 X10(3)/MCL (ref 130–400)
PMV BLD AUTO: 9.6 FL (ref 7.4–10.4)
POTASSIUM SERPL-SCNC: 4.7 MMOL/L (ref 3.5–5.1)
PROT SERPL-MCNC: 6.7 GM/DL (ref 5.8–7.6)
RBC # BLD AUTO: 3.06 X10(6)/MCL (ref 4.7–6.1)
SODIUM SERPL-SCNC: 140 MMOL/L (ref 136–145)
WBC # BLD AUTO: 3.13 X10(3)/MCL (ref 4.5–11.5)

## 2024-12-31 PROCEDURE — 36415 COLL VENOUS BLD VENIPUNCTURE: CPT

## 2024-12-31 PROCEDURE — 85025 COMPLETE CBC W/AUTO DIFF WBC: CPT

## 2024-12-31 PROCEDURE — 80053 COMPREHEN METABOLIC PANEL: CPT

## 2024-12-31 RX ORDER — EPINEPHRINE 0.3 MG/.3ML
0.3 INJECTION SUBCUTANEOUS ONCE AS NEEDED
Status: CANCELLED | OUTPATIENT
Start: 2025-01-02

## 2024-12-31 RX ORDER — PROCHLORPERAZINE EDISYLATE 5 MG/ML
5 INJECTION INTRAMUSCULAR; INTRAVENOUS ONCE AS NEEDED
Status: CANCELLED | OUTPATIENT
Start: 2025-01-02

## 2024-12-31 RX ORDER — SODIUM CHLORIDE 0.9 % (FLUSH) 0.9 %
10 SYRINGE (ML) INJECTION
Status: CANCELLED | OUTPATIENT
Start: 2025-01-02

## 2024-12-31 RX ORDER — HEPARIN 100 UNIT/ML
500 SYRINGE INTRAVENOUS
Status: CANCELLED | OUTPATIENT
Start: 2025-01-02

## 2024-12-31 RX ORDER — DIPHENHYDRAMINE HYDROCHLORIDE 50 MG/ML
50 INJECTION INTRAMUSCULAR; INTRAVENOUS ONCE AS NEEDED
Status: CANCELLED | OUTPATIENT
Start: 2025-01-02

## 2025-01-02 ENCOUNTER — INFUSION (OUTPATIENT)
Dept: INFUSION THERAPY | Facility: HOSPITAL | Age: 70
End: 2025-01-02
Attending: INTERNAL MEDICINE
Payer: MEDICARE

## 2025-01-02 VITALS
OXYGEN SATURATION: 100 % | HEIGHT: 68 IN | WEIGHT: 192.38 LBS | BODY MASS INDEX: 29.16 KG/M2 | SYSTOLIC BLOOD PRESSURE: 156 MMHG | RESPIRATION RATE: 16 BRPM | HEART RATE: 72 BPM | TEMPERATURE: 98 F | DIASTOLIC BLOOD PRESSURE: 65 MMHG

## 2025-01-02 DIAGNOSIS — C61 PROSTATE CANCER: Primary | ICD-10-CM

## 2025-01-02 PROCEDURE — 96367 TX/PROPH/DG ADDL SEQ IV INF: CPT

## 2025-01-02 PROCEDURE — 96413 CHEMO IV INFUSION 1 HR: CPT

## 2025-01-02 PROCEDURE — 25000003 PHARM REV CODE 250: Performed by: NURSE PRACTITIONER

## 2025-01-02 PROCEDURE — A4216 STERILE WATER/SALINE, 10 ML: HCPCS | Performed by: NURSE PRACTITIONER

## 2025-01-02 PROCEDURE — 96377 APPLICATON ON-BODY INJECTOR: CPT

## 2025-01-02 PROCEDURE — 63600175 PHARM REV CODE 636 W HCPCS: Performed by: NURSE PRACTITIONER

## 2025-01-02 RX ORDER — EPINEPHRINE 0.3 MG/.3ML
0.3 INJECTION SUBCUTANEOUS ONCE AS NEEDED
Status: DISCONTINUED | OUTPATIENT
Start: 2025-01-02 | End: 2025-01-02 | Stop reason: HOSPADM

## 2025-01-02 RX ORDER — SODIUM CHLORIDE 0.9 % (FLUSH) 0.9 %
10 SYRINGE (ML) INJECTION
Status: DISCONTINUED | OUTPATIENT
Start: 2025-01-02 | End: 2025-01-02 | Stop reason: HOSPADM

## 2025-01-02 RX ORDER — PROCHLORPERAZINE EDISYLATE 5 MG/ML
5 INJECTION INTRAMUSCULAR; INTRAVENOUS ONCE AS NEEDED
Status: DISCONTINUED | OUTPATIENT
Start: 2025-01-02 | End: 2025-01-02 | Stop reason: HOSPADM

## 2025-01-02 RX ORDER — HEPARIN 100 UNIT/ML
500 SYRINGE INTRAVENOUS
Status: DISCONTINUED | OUTPATIENT
Start: 2025-01-02 | End: 2025-01-02 | Stop reason: HOSPADM

## 2025-01-02 RX ORDER — DIPHENHYDRAMINE HYDROCHLORIDE 50 MG/ML
50 INJECTION INTRAMUSCULAR; INTRAVENOUS ONCE AS NEEDED
Status: DISCONTINUED | OUTPATIENT
Start: 2025-01-02 | End: 2025-01-02 | Stop reason: HOSPADM

## 2025-01-02 RX ADMIN — HEPARIN 500 UNITS: 100 SYRINGE at 02:01

## 2025-01-02 RX ADMIN — SODIUM CHLORIDE 20 MG: 9 INJECTION, SOLUTION INTRAVENOUS at 12:01

## 2025-01-02 RX ADMIN — DOCETAXEL 120 MG: 20 INJECTION, SOLUTION, CONCENTRATE INTRAVENOUS at 01:01

## 2025-01-02 RX ADMIN — PEGFILGRASTIM 6 MG: KIT SUBCUTANEOUS at 02:01

## 2025-01-02 RX ADMIN — Medication 10 ML: at 02:01

## 2025-01-02 RX ADMIN — SODIUM CHLORIDE 1000 ML: 9 INJECTION, SOLUTION INTRAVENOUS at 12:01

## 2025-01-08 DIAGNOSIS — C61 PROSTATE CANCER: ICD-10-CM

## 2025-01-08 DIAGNOSIS — C22.9 MALIGNANT NEOPLASM OF LIVER, UNSPECIFIED LIVER MALIGNANCY TYPE: ICD-10-CM

## 2025-01-08 NOTE — PROGRESS NOTES
Subjective:       Patient ID: Juan Mayo is a 69 y.o. male.    PCP: Dr. Dalton Ang  Urology: Dr. Luis Worthy    1. Recurrence Stage IV--2024  H/o Prostate Cancer Stage IIA ([id number])--Diagnosed   Biopsy/pathology:  Prostate biopsy 2018--10/12 cores positive for adenocarcinoma, [place] 7.  CT-guided liver biopsy done 24--metastatic carcinoma c/w prostate primary site. CARIS with +Genomic LOUANN high, AR positive 2+ 100%, TMPRSS2 pathogenic fusion, TP53 pathogenic variant Exon 7, AKT1 pathogenic variant, APC pathogenic variant, MYC amplified, BRAF negative, CLARITA, NTRK fusion negative, RET negative, TMB low, SREEKANTH negative, BRCA1/2 negative (Clinical trials listed on [facility]).  Guardant liquid biopsy done 24--[id number] mutation (drug Capivasertib FDA-approved for breast cancer), TP53 mutation, APC mutation, MYC amplification, EGFR amplification.   Imagin. NM Bone Scan 18 at [facility]--small focus of increased activity overlies posterior right 10th rib, more intense focus of asymmetric activity right of midline T1 segment laterally, most typical degenerative origin, no findings elsewhere.  2. Prostate MRI w/ and w/o contrast [facility] 18--biopsy-proven high volume disease largely obscured by extensive biopsy-related hemorrhage, highest volume of disease right apex, no gross extracapsular disease, no seminal vesicle invasion or neurovascular bundle involvement, no skeletal or ranjeet metastases.  3. PSMA PET/CT on 24--Low level uptake within prominent pelvic lymph nodes suspicious for metastatic disease. Activity above baseline involving the cervical, thoracic and lumbar spine as well as the right ischial tuberosity compatible with osseous metastatic disease. Innumerable photopenic, hypodense hepatic lesions suspicious for metastatic disease. Correlate with MR.  Pulmonary emphysema. Emphysema on CT is an independent risk factor for lung cancer. Low-dose cancer screening to be considered in the future, if  patient does not already enrolled in such a program.   4. MRI abdomen w&w/o contrast on 8/29/24--Numerous liver metastases noted.  Greater than 30 lesions seen, appear roughly larger and more numerous compared to prior scan in 7/2024, index lesion measured in the right posterolateral aspect of the right hepatic lobe measuring 3.8 cm, gallbladder and biliary ductal system normal, pancreas normal, possible periaortic lymph node on the left measuring 1.2 cm, numerous metastatic liver lesions.  Lesions appear roughly larger and more numerous compared to a prior noncontrast CT exam from 7/19/24, possible single lymph node metastasis left periaortic region.  5. PSMA PET done 1/14/25--results pending, liver much improved by my review.      2. Anemia--Since 2019    Genetic testing:  Eliezer done and negative.     Work-up:  2/2020--retic 1.4.  2/2021--iron saturation 22%, ferritin 260, vitamin B12 678.  3/2021--LDH normal, Haptoglobin normal, retic 1.9 (low-normal), folic acid normal, iron saturation 25%, ferritin 200, Radha negative, TSH normal 0.81, peripheral smear with normocytic anemia w/o anisocytosis.  6/2021--SPEP no M-spike, MAY negative, RF normal.    PSA:  01/2021--0.09, testosterone 199 (normal 343-1275).  08/27/24--113.73  09/17/24--30.63  10/02/24--4.74  10/30/24--0.85  12/17/24--0.17  01/09/25--<0.10    Procedures:  Mediport placement by Dr. Victor Hugo Gutierrez on 10/7/24.     Treatment history:  1. Prostate radiation 9/25/18--11/27/18.  2. Lupron/ADT X 1 year--completed in 7/2019.   3. XGEVA x 1 dose given 11/21/24 (stopped since still castrate sensitive).  4. Taxotere every 3 weeks X 6 cycles completed 9/19/24--1/2/25.     Treatment plan:  Eligard 45mg subQ every 6 months started 8/27/24.   Darolutamide 600mg PO BID started on 9/8/24.     Chief Complaint: Follow-up    HPI   Patient presents for a treatment visit for his recurrent prostate cancer.  He has now completed his 6 cycles of Taxotere. He tolerated well aside  from fatigue. He did have some nail changes, which are resolving. He has no other complaints today. His wife is with him. His PET has not been read, but I went over the images with him.     Past Medical History:   Diagnosis Date    Anemia, unspecified     Bilateral carotid artery stenosis     Essential (primary) hypertension     Fatigue     Prostate cancer     Urinary retention     Vitamin D deficiency       Review of patient's allergies indicates:  No Known Allergies   Current Outpatient Medications on File Prior to Visit   Medication Sig Dispense Refill    aspirin 81 mg Cap Take 81 mg by mouth.      atorvastatin (LIPITOR) 20 MG tablet Take 1 tablet (20 mg total) by mouth every evening. 30 tablet 11    cholecalciferol, vitamin D3, (VITAMIN D3) 25 mcg (1,000 unit) capsule Take 25 mcg by mouth.      darolutamide 300 mg Tab Take 300 mg by mouth 2 (two) times daily.      fenofibrate 160 MG Tab Take 1 tablet (160 mg total) by mouth once daily. 30 tablet 11    folic acid (FOLVITE) 1 MG tablet Take 1 tablet (1 mg total) by mouth once daily. 90 tablet 3    multivitamin (MULTIPLE VITAMIN ORAL) Take 1 capsule by mouth once daily.      NIFEdipine (PROCARDIA-XL) 30 MG (OSM) 24 hr tablet Take 1 tablet (30 mg total) by mouth every morning. 30 tablet 11    ondansetron (ZOFRAN-ODT) 8 MG TbDL Take 1 tablet (8 mg total) by mouth every 8 (eight) hours as needed (nausea). 30 tablet 1    prochlorperazine (COMPAZINE) 5 MG tablet Take 1 tablet (5 mg total) by mouth every 6 (six) hours as needed for Nausea. 20 tablet 5    ramipriL (ALTACE) 10 MG capsule Take 10 mg by mouth once daily.      tamsulosin (FLOMAX) 0.4 mg Cap Take 0.4 mg by mouth once daily.      triamcinolone acetonide 0.1% (KENALOG) 0.1 % cream APPLY TOPICALLY 2 (TWO) TIMES DAILY. 80 g 1     Current Facility-Administered Medications on File Prior to Visit   Medication Dose Route Frequency Provider Last Rate Last Admin    [COMPLETED] kit prep of Ga-68-gozetotide 25 mcg SolR 5  millicurie  5 millicurie Intravenous ONCE PRN Rhianna Zamora MD   4.9 millicurie at 01/14/25 1151      Review of Systems   Constitutional:  Positive for fatigue. Negative for appetite change and unexpected weight change.   HENT:  Negative for mouth sores.    Eyes:  Negative for visual disturbance.   Respiratory:  Negative for cough and shortness of breath.    Cardiovascular:  Negative for chest pain.   Gastrointestinal:  Negative for abdominal pain and diarrhea.   Genitourinary:  Negative for frequency.   Musculoskeletal:  Negative for back pain.   Integumentary:  Negative for rash.        Dry skin to hands   Neurological:  Negative for headaches.   Hematological:  Negative for adenopathy.   Psychiatric/Behavioral:  The patient is not nervous/anxious.      Wt Readings from Last 3 Encounters:   01/15/25 0914 85.1 kg (187 lb 11.2 oz)   01/02/25 1237 87.3 kg (192 lb 6.4 oz)   12/18/24 0956 84.3 kg (185 lb 14.4 oz)     Physical Exam  Constitutional:       General: He is awake.      Appearance: Normal appearance. He is normal weight.   HENT:      Head: Normocephalic.   Eyes:      General: Lids are normal. Vision grossly intact.      Extraocular Movements: Extraocular movements intact.      Conjunctiva/sclera: Conjunctivae normal.   Cardiovascular:      Rate and Rhythm: Normal rate and regular rhythm.      Pulses: Normal pulses.      Heart sounds: Normal heart sounds.   Pulmonary:      Effort: Pulmonary effort is normal.      Breath sounds: Normal breath sounds and air entry.   Chest:      Comments: Left CW mediport intact  Abdominal:      General: Abdomen is flat. Bowel sounds are normal.      Palpations: Abdomen is soft.   Musculoskeletal:      Cervical back: Normal range of motion and neck supple.   Lymphadenopathy:      Comments: No palpable adenopathy   Skin:     General: Skin is warm and moist.      Comments: No rash today   Neurological:      General: No focal deficit present.      Mental Status: He is alert and  oriented to person, place, and time.   Psychiatric:         Attention and Perception: Attention normal.         Mood and Affect: Mood normal.         Speech: Speech normal.         Behavior: Behavior is cooperative.         Thought Content: Thought content normal.         Cognition and Memory: Cognition normal.         Judgment: Judgment normal.        Lab Visit on 01/15/2025   Component Date Value    WBC 01/15/2025 7.87     RBC 01/15/2025 3.16 (L)     Hgb 01/15/2025 9.7 (L)     Hct 01/15/2025 31.6 (L)     MCV 01/15/2025 100.0 (H)     MCH 01/15/2025 30.7     MCHC 01/15/2025 30.7 (L)     RDW 01/15/2025 16.6     Platelet 01/15/2025 172     MPV 01/15/2025 10.3     Neut % 01/15/2025 83.7     Lymph % 01/15/2025 7.4     Mono % 01/15/2025 5.2     Eos % 01/15/2025 0.1     Basophil % 01/15/2025 0.4     Imm Grans % 01/15/2025 3.2     Neut # 01/15/2025 6.59     Lymph # 01/15/2025 0.58 (L)     Mono # 01/15/2025 0.41     Eos # 01/15/2025 0.01     Baso # 01/15/2025 0.03     Imm Gran # 01/15/2025 0.25 (H)    Lab Visit on 01/09/2025   Component Date Value    Sodium 01/09/2025 140     Potassium 01/09/2025 4.2     Chloride 01/09/2025 108 (H)     CO2 01/09/2025 25     Glucose 01/09/2025 113     Blood Urea Nitrogen 01/09/2025 12.6     Creatinine 01/09/2025 1.14     Calcium 01/09/2025 9.8     Protein Total 01/09/2025 6.7     Albumin 01/09/2025 3.5     Globulin 01/09/2025 3.2     Albumin/Globulin Ratio 01/09/2025 1.1     Bilirubin Total 01/09/2025 0.5     ALP 01/09/2025 91     ALT 01/09/2025 27     AST 01/09/2025 34     eGFR 01/09/2025 >60     Anion Gap 01/09/2025 7.0     BUN/Creatinine Ratio 01/09/2025 11     Prostate Specific Antigen 01/09/2025 <0.10     WBC 01/09/2025 3.62 (L)     RBC 01/09/2025 2.78 (L)     Hgb 01/09/2025 8.7 (L)     Hct 01/09/2025 27.6 (L)     MCV 01/09/2025 99.3 (H)     MCH 01/09/2025 31.3 (H)     MCHC 01/09/2025 31.5 (L)     RDW 01/09/2025 15.6     Platelet 01/09/2025 150     MPV 01/09/2025 10.8 (H)     Neut %  01/09/2025 65.5     Lymph % 01/09/2025 14.6     Mono % 01/09/2025 11.3     Eos % 01/09/2025 1.4     Basophil % 01/09/2025 0.8     Imm Grans % 01/09/2025 6.4     Neut # 01/09/2025 2.37     Lymph # 01/09/2025 0.53 (L)     Mono # 01/09/2025 0.41     Eos # 01/09/2025 0.05     Baso # 01/09/2025 0.03     Imm Gran # 01/09/2025 0.23 (H)       Assessment:       1. Prostate cancer    2. Metastasis to bone    3. 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 Antineoplastic chemotherapy induced anemia        Plan:       Patient was following with me for a mild anemia.  H/o Prostate cancer in 2018, diagnosed with recurrence 7/2024 liver and bone metastases seen on PSMA PET done for elevated PSA.    Patient underwent liver biopsy on 8/27/24, pathology c/w metastatic prostate cancer.  Started on Carlos 8/27/24 with urology Dr. Worthy and Darolutamide ordered, but patient has not received yet.  Per Dr. Tobias, genetic testing negative and liquid Guardant also done, will obtain results.  Dr. Tobias also ordering CARIS for liver biopsy and there were no results with therapy associations, though there were some clinical trials listed.     Recommend treatment with triplet therapy ADT with Docetaxel plus Darolutamide given multi-organ involvement/high volume metastases per [facility].   Dr. Zamora added Taxotere chemotherapy to his current regimen. Plan for 6 cycles at 60mg/m2 given his age.    Darolutamide started on 9/8/24.     Patient was given 1 dose of XGEVA on 11/21/24, but stopped since he is still castrate-sensitive.     Cycle 1 Taxotere started on 9/19/24.   Completed Cycle 6 on 1/2/25. Overall he tolerated treatment well.      CBC today with anemia slightly improved, WBC good, platelets normal. CMP pending and I will follow-up.   Last PSA now undetectable.  PSMA PET done 1/14/25, has not been read, but by my review shows liver improvement, there is a new lesion lower spine L4/L5, which I will wait for radiology  reading.    Plan to continue now with ADT Eligard and Darolutamide.     F/u in 1 month with repeat labs.  Will repeat MRI abdomen w/ and w/o contrast liver protocol as well for better idea of improvement in liver.   If there is concern about spine lesion at L4/5, can also obtain MRI lumbar spine. He is not having any pain.     XGEVA monthly started on 11/21/24. Stopped today since his disease is still castrate sensitive.     Previously discussed with patient and his wife incurable nature of is cancer and palliative goals of treatment.    All questions answered at this time.      Rhianna Zamora MD      Visit today included increased complexity associated with the care of the episodic problem metastatic prostate cancer, addressing and managing the longitudinal care of the patient's prostate cancer.

## 2025-01-09 ENCOUNTER — LAB VISIT (OUTPATIENT)
Dept: LAB | Facility: HOSPITAL | Age: 70
End: 2025-01-09
Attending: INTERNAL MEDICINE
Payer: MEDICARE

## 2025-01-09 DIAGNOSIS — C79.51 METASTASIS TO BONE: ICD-10-CM

## 2025-01-09 DIAGNOSIS — C78.7 METASTASES TO THE LIVER: ICD-10-CM

## 2025-01-09 DIAGNOSIS — C61 PROSTATE CANCER: ICD-10-CM

## 2025-01-09 LAB
ALBUMIN SERPL-MCNC: 3.5 G/DL (ref 3.4–4.8)
ALBUMIN/GLOB SERPL: 1.1 RATIO (ref 1.1–2)
ALP SERPL-CCNC: 91 UNIT/L (ref 40–150)
ALT SERPL-CCNC: 27 UNIT/L (ref 0–55)
ANION GAP SERPL CALC-SCNC: 7 MEQ/L
AST SERPL-CCNC: 34 UNIT/L (ref 5–34)
BASOPHILS # BLD AUTO: 0.03 X10(3)/MCL
BASOPHILS NFR BLD AUTO: 0.8 %
BILIRUB SERPL-MCNC: 0.5 MG/DL
BUN SERPL-MCNC: 12.6 MG/DL (ref 8.4–25.7)
CALCIUM SERPL-MCNC: 9.8 MG/DL (ref 8.8–10)
CHLORIDE SERPL-SCNC: 108 MMOL/L (ref 98–107)
CO2 SERPL-SCNC: 25 MMOL/L (ref 23–31)
CREAT SERPL-MCNC: 1.14 MG/DL (ref 0.72–1.25)
CREAT/UREA NIT SERPL: 11
EOSINOPHIL # BLD AUTO: 0.05 X10(3)/MCL (ref 0–0.9)
EOSINOPHIL NFR BLD AUTO: 1.4 %
ERYTHROCYTE [DISTWIDTH] IN BLOOD BY AUTOMATED COUNT: 15.6 % (ref 11.5–17)
GFR SERPLBLD CREATININE-BSD FMLA CKD-EPI: >60 ML/MIN/1.73/M2
GLOBULIN SER-MCNC: 3.2 GM/DL (ref 2.4–3.5)
GLUCOSE SERPL-MCNC: 113 MG/DL (ref 82–115)
HCT VFR BLD AUTO: 27.6 % (ref 42–52)
HGB BLD-MCNC: 8.7 G/DL (ref 14–18)
IMM GRANULOCYTES # BLD AUTO: 0.23 X10(3)/MCL (ref 0–0.04)
IMM GRANULOCYTES NFR BLD AUTO: 6.4 %
LYMPHOCYTES # BLD AUTO: 0.53 X10(3)/MCL (ref 0.6–4.6)
LYMPHOCYTES NFR BLD AUTO: 14.6 %
MCH RBC QN AUTO: 31.3 PG (ref 27–31)
MCHC RBC AUTO-ENTMCNC: 31.5 G/DL (ref 33–36)
MCV RBC AUTO: 99.3 FL (ref 80–94)
MONOCYTES # BLD AUTO: 0.41 X10(3)/MCL (ref 0.1–1.3)
MONOCYTES NFR BLD AUTO: 11.3 %
NEUTROPHILS # BLD AUTO: 2.37 X10(3)/MCL (ref 2.1–9.2)
NEUTROPHILS NFR BLD AUTO: 65.5 %
PLATELET # BLD AUTO: 150 X10(3)/MCL (ref 130–400)
PMV BLD AUTO: 10.8 FL (ref 7.4–10.4)
POTASSIUM SERPL-SCNC: 4.2 MMOL/L (ref 3.5–5.1)
PROT SERPL-MCNC: 6.7 GM/DL (ref 5.8–7.6)
PSA SERPL-MCNC: <0.1 NG/ML
RBC # BLD AUTO: 2.78 X10(6)/MCL (ref 4.7–6.1)
SODIUM SERPL-SCNC: 140 MMOL/L (ref 136–145)
WBC # BLD AUTO: 3.62 X10(3)/MCL (ref 4.5–11.5)

## 2025-01-09 PROCEDURE — 80053 COMPREHEN METABOLIC PANEL: CPT

## 2025-01-09 PROCEDURE — 36415 COLL VENOUS BLD VENIPUNCTURE: CPT

## 2025-01-09 PROCEDURE — 84153 ASSAY OF PSA TOTAL: CPT

## 2025-01-09 PROCEDURE — 85025 COMPLETE CBC W/AUTO DIFF WBC: CPT

## 2025-01-09 RX ORDER — TRIAMCINOLONE ACETONIDE 1 MG/G
CREAM TOPICAL 2 TIMES DAILY
Qty: 80 G | Refills: 1 | Status: SHIPPED | OUTPATIENT
Start: 2025-01-09

## 2025-01-14 ENCOUNTER — HOSPITAL ENCOUNTER (OUTPATIENT)
Dept: RADIOLOGY | Facility: HOSPITAL | Age: 70
Discharge: HOME | End: 2025-01-14
Attending: INTERNAL MEDICINE
Payer: MEDICARE

## 2025-01-14 DIAGNOSIS — C78.7 METASTASES TO THE LIVER: ICD-10-CM

## 2025-01-14 DIAGNOSIS — C79.51 METASTASIS TO BONE: ICD-10-CM

## 2025-01-14 DIAGNOSIS — C61 PROSTATE CANCER: ICD-10-CM

## 2025-01-14 PROCEDURE — A9596 HC GALLIUM GA-68 GOZETOTIDE, DX (ILLUCCIX), PER 1 MCI: HCPCS | Mod: TB | Performed by: INTERNAL MEDICINE

## 2025-01-14 PROCEDURE — 78815 PET IMAGE W/CT SKULL-THIGH: CPT | Mod: TC

## 2025-01-14 RX ADMIN — KIT FOR THE PREPARATION OF GALLIUM GA 68 GOZETOTIDE INJECTION 4.9 MILLICURIE: KIT INTRAVENOUS at 11:01

## 2025-01-15 ENCOUNTER — LAB VISIT (OUTPATIENT)
Dept: LAB | Facility: HOSPITAL | Age: 70
End: 2025-01-15
Attending: INTERNAL MEDICINE
Payer: MEDICARE

## 2025-01-15 ENCOUNTER — OFFICE VISIT (OUTPATIENT)
Dept: HEMATOLOGY/ONCOLOGY | Facility: CLINIC | Age: 70
End: 2025-01-15
Payer: MEDICARE

## 2025-01-15 VITALS
DIASTOLIC BLOOD PRESSURE: 68 MMHG | TEMPERATURE: 98 F | OXYGEN SATURATION: 99 % | WEIGHT: 187.69 LBS | BODY MASS INDEX: 28.44 KG/M2 | SYSTOLIC BLOOD PRESSURE: 117 MMHG | HEART RATE: 73 BPM | RESPIRATION RATE: 18 BRPM | HEIGHT: 68 IN

## 2025-01-15 DIAGNOSIS — C79.51 METASTASIS TO BONE: ICD-10-CM

## 2025-01-15 DIAGNOSIS — D84.821 IMMUNODEFICIENCY DUE TO DRUGS: ICD-10-CM

## 2025-01-15 DIAGNOSIS — C78.7 METASTASES TO THE LIVER: ICD-10-CM

## 2025-01-15 DIAGNOSIS — C61 PROSTATE CANCER: ICD-10-CM

## 2025-01-15 DIAGNOSIS — T45.1X5A ANTINEOPLASTIC CHEMOTHERAPY INDUCED ANEMIA: ICD-10-CM

## 2025-01-15 DIAGNOSIS — D64.81 ANTINEOPLASTIC CHEMOTHERAPY INDUCED ANEMIA: ICD-10-CM

## 2025-01-15 DIAGNOSIS — Z79.899 IMMUNODEFICIENCY DUE TO DRUGS: ICD-10-CM

## 2025-01-15 DIAGNOSIS — C61 PROSTATE CANCER: Primary | ICD-10-CM

## 2025-01-15 LAB
ALBUMIN SERPL-MCNC: 3.5 G/DL (ref 3.4–4.8)
ALBUMIN/GLOB SERPL: 1.1 RATIO (ref 1.1–2)
ALP SERPL-CCNC: 86 UNIT/L (ref 40–150)
ALT SERPL-CCNC: 26 UNIT/L (ref 0–55)
ANION GAP SERPL CALC-SCNC: 6 MEQ/L
AST SERPL-CCNC: 31 UNIT/L (ref 5–34)
BASOPHILS # BLD AUTO: 0.03 X10(3)/MCL
BASOPHILS NFR BLD AUTO: 0.4 %
BILIRUB SERPL-MCNC: 0.4 MG/DL
BUN SERPL-MCNC: 14.8 MG/DL (ref 8.4–25.7)
CALCIUM SERPL-MCNC: 9.5 MG/DL (ref 8.8–10)
CHLORIDE SERPL-SCNC: 110 MMOL/L (ref 98–107)
CO2 SERPL-SCNC: 24 MMOL/L (ref 23–31)
CREAT SERPL-MCNC: 1.26 MG/DL (ref 0.72–1.25)
CREAT/UREA NIT SERPL: 12
EOSINOPHIL # BLD AUTO: 0.01 X10(3)/MCL (ref 0–0.9)
EOSINOPHIL NFR BLD AUTO: 0.1 %
ERYTHROCYTE [DISTWIDTH] IN BLOOD BY AUTOMATED COUNT: 16.6 % (ref 11.5–17)
GFR SERPLBLD CREATININE-BSD FMLA CKD-EPI: >60 ML/MIN/1.73/M2
GLOBULIN SER-MCNC: 3.2 GM/DL (ref 2.4–3.5)
GLUCOSE SERPL-MCNC: 97 MG/DL (ref 82–115)
HCT VFR BLD AUTO: 31.6 % (ref 42–52)
HGB BLD-MCNC: 9.7 G/DL (ref 14–18)
IMM GRANULOCYTES # BLD AUTO: 0.25 X10(3)/MCL (ref 0–0.04)
IMM GRANULOCYTES NFR BLD AUTO: 3.2 %
LYMPHOCYTES # BLD AUTO: 0.58 X10(3)/MCL (ref 0.6–4.6)
LYMPHOCYTES NFR BLD AUTO: 7.4 %
MCH RBC QN AUTO: 30.7 PG (ref 27–31)
MCHC RBC AUTO-ENTMCNC: 30.7 G/DL (ref 33–36)
MCV RBC AUTO: 100 FL (ref 80–94)
MONOCYTES # BLD AUTO: 0.41 X10(3)/MCL (ref 0.1–1.3)
MONOCYTES NFR BLD AUTO: 5.2 %
NEUTROPHILS # BLD AUTO: 6.59 X10(3)/MCL (ref 2.1–9.2)
NEUTROPHILS NFR BLD AUTO: 83.7 %
PLATELET # BLD AUTO: 172 X10(3)/MCL (ref 130–400)
PMV BLD AUTO: 10.3 FL (ref 7.4–10.4)
POTASSIUM SERPL-SCNC: 4.7 MMOL/L (ref 3.5–5.1)
PROT SERPL-MCNC: 6.7 GM/DL (ref 5.8–7.6)
RBC # BLD AUTO: 3.16 X10(6)/MCL (ref 4.7–6.1)
SODIUM SERPL-SCNC: 140 MMOL/L (ref 136–145)
WBC # BLD AUTO: 7.87 X10(3)/MCL (ref 4.5–11.5)

## 2025-01-15 PROCEDURE — 80053 COMPREHEN METABOLIC PANEL: CPT

## 2025-01-15 PROCEDURE — 99215 OFFICE O/P EST HI 40 MIN: CPT | Mod: PBBFAC | Performed by: INTERNAL MEDICINE

## 2025-01-15 PROCEDURE — 99999 PR PBB SHADOW E&M-EST. PATIENT-LVL V: CPT | Mod: PBBFAC,,, | Performed by: INTERNAL MEDICINE

## 2025-01-15 PROCEDURE — G2211 COMPLEX E/M VISIT ADD ON: HCPCS | Mod: S$PBB,,, | Performed by: INTERNAL MEDICINE

## 2025-01-15 PROCEDURE — 36415 COLL VENOUS BLD VENIPUNCTURE: CPT

## 2025-01-15 PROCEDURE — 99215 OFFICE O/P EST HI 40 MIN: CPT | Mod: S$PBB,,, | Performed by: INTERNAL MEDICINE

## 2025-01-15 PROCEDURE — 85025 COMPLETE CBC W/AUTO DIFF WBC: CPT

## 2025-01-16 ENCOUNTER — TELEPHONE (OUTPATIENT)
Dept: HEMATOLOGY/ONCOLOGY | Facility: CLINIC | Age: 70
End: 2025-01-16
Payer: MEDICARE

## 2025-01-16 RX ORDER — HEPARIN 100 UNIT/ML
500 SYRINGE INTRAVENOUS
OUTPATIENT
Start: 2025-02-10

## 2025-01-16 RX ORDER — SODIUM CHLORIDE 0.9 % (FLUSH) 0.9 %
10 SYRINGE (ML) INJECTION
OUTPATIENT
Start: 2025-02-10

## 2025-01-16 NOTE — TELEPHONE ENCOUNTER
PSMA PET with improved findings aside from new L4 lesion.  He is scheduled for MRI liver on 2/10/25. Will see if they can add MRI lumbar spine.      Rhianna Zamora MD

## 2025-01-17 DIAGNOSIS — C79.51 METASTASIS TO BONE: ICD-10-CM

## 2025-01-17 DIAGNOSIS — C61 PROSTATE CANCER: Primary | ICD-10-CM

## 2025-02-06 NOTE — PROGRESS NOTES
Subjective:       Patient ID: Juan Mayo is a 69 y.o. male.    PCP: Dr. Dalton Ang  Urology: Dr. Luis Worthy    1. Recurrence Stage IV--2024  H/o Prostate Cancer Stage IIA ([id number])--Diagnosed   Biopsy/pathology:  Prostate biopsy 2018--10/12 cores positive for adenocarcinoma, Bridger 7.  CT-guided liver biopsy done 24--metastatic carcinoma c/w prostate primary site. CARIS with +Genomic LOUANN high, AR positive 2+ 100%, TMPRSS2 pathogenic fusion, TP53 pathogenic variant Exon 7, AKT1 pathogenic variant, APC pathogenic variant, MYC amplified, BRAF negative, CLARITA, NTRK fusion negative, RET negative, TMB low, SREEKANTH negative, BRCA1/2 negative (Clinical trials listed on [facility]).  Guardant liquid biopsy done 24--[id number] mutation (drug Capivasertib FDA-approved for breast cancer), TP53 mutation, APC mutation, MYC amplification, EGFR amplification.   Imagin. NM Bone Scan 18 at [facility]--small focus of increased activity overlies posterior right 10th rib, more intense focus of asymmetric activity right of midline T1 segment laterally, most typical degenerative origin, no findings elsewhere.  2. Prostate MRI w/ and w/o contrast [facility] 18--biopsy-proven high volume disease largely obscured by extensive biopsy-related hemorrhage, highest volume of disease right apex, no gross extracapsular disease, no seminal vesicle invasion or neurovascular bundle involvement, no skeletal or ranjeet metastases.  3. PSMA PET/CT on 24--Low level uptake within prominent pelvic lymph nodes suspicious for metastatic disease. Activity above baseline involving the cervical, thoracic and lumbar spine as well as the right ischial tuberosity compatible with osseous metastatic disease. Innumerable photopenic, hypodense hepatic lesions suspicious for metastatic disease. Correlate with MR.  Pulmonary emphysema. Emphysema on CT is an independent risk factor for lung cancer. Low-dose cancer screening to be considered in the future, if  patient does not already enrolled in such a program.   4. MRI abdomen w&w/o contrast on 8/29/24--Numerous liver metastases noted.  Greater than 30 lesions seen, appear roughly larger and more numerous compared to prior scan in 7/2024, index lesion measured in the right posterolateral aspect of the right hepatic lobe measuring 3.8 cm, gallbladder and biliary ductal system normal, pancreas normal, possible periaortic lymph node on the left measuring 1.2 cm, numerous metastatic liver lesions.  Lesions appear roughly larger and more numerous compared to a prior noncontrast CT exam from 7/19/24, possible single lymph node metastasis left periaortic region.  5. PSMA PET done 1/14/25--Nonspecific small area of hypermetabolism in the central prostate, could be artifactual from urine in the urethra, 3 osseous lesions have decreased radiotracer activity since July.  New 23 mm hypermetabolic lesion in the L4 vertebral body. Hypodense liver lesions stable to slightly smaller on the CT images with no defined hypermetabolism.  6. MRI abdomen w/ and w/o contrast done 2/10/25--Interval treatment response with decreased size of bilobar hepatic lesions and decreased enhancement at osseous lesions.       2. Anemia--Since 2019    Genetic testing:  Ambry done and negative.     Work-up:  2/2020--retic 1.4.  2/2021--iron saturation 22%, ferritin 260, vitamin B12 678.  3/2021--LDH normal, Haptoglobin normal, retic 1.9 (low-normal), folic acid normal, iron saturation 25%, ferritin 200, Radha negative, TSH normal 0.81, peripheral smear with normocytic anemia w/o anisocytosis.  6/2021--SPEP no M-spike, MAY negative, RF normal.    PSA:  01/2021--0.09, testosterone 199 (normal 343-1275).  08/27/24--113.73  09/17/24--30.63  10/02/24--4.74  10/30/24--0.85  12/17/24--0.17  01/09/25--<0.10  02/10/25--<0.10      Procedures:  Mediport placement by Dr. Victor Hugo Gutierrez on 10/7/24.     Treatment history:  1. Prostate radiation 9/25/18--11/27/18.  2.  Lupron/ADT X 1 year--completed in 7/2019.   3. XGEVA x 1 dose given 11/21/24 (stopped since still castrate sensitive).  4. Taxotere every 3 weeks X 6 cycles completed 9/19/24--1/2/25.     Treatment plan:  Eligard 45mg subQ every 6 months started 8/27/24. Next due on 2/24/25  Darolutamide 600mg PO BID started on 9/8/24.     Chief Complaint: Other Misc (Pt reports no concerns today.///)    HPI   Patient presents for a treatment visit for his recurrent prostate cancer. He is doing well. His sister is with him today. MRI is much improved and I went over results with patient and his sister. MRI lumbar spine is pending. He has some occasional lower back pain, but not too bad. He is overall feeling better since completing his chemotherapy.     Past Medical History:   Diagnosis Date    Anemia, unspecified     Bilateral carotid artery stenosis     Essential (primary) hypertension     Fatigue     Prostate cancer     Urinary retention     Vitamin D deficiency       Review of patient's allergies indicates:  No Known Allergies   Current Outpatient Medications on File Prior to Visit   Medication Sig Dispense Refill    aspirin 81 mg Cap Take 81 mg by mouth.      atorvastatin (LIPITOR) 20 MG tablet Take 1 tablet (20 mg total) by mouth every evening. 30 tablet 11    cholecalciferol, vitamin D3, (VITAMIN D3) 25 mcg (1,000 unit) capsule Take 25 mcg by mouth.      darolutamide 300 mg Tab Take 300 mg by mouth 2 (two) times daily.      fenofibrate 160 MG Tab TAKE 1 TABLET (160 MG TOTAL) BY MOUTH ONCE DAILY. 30 tablet 11    folic acid (FOLVITE) 1 MG tablet Take 1 tablet (1 mg total) by mouth once daily. 90 tablet 3    multivitamin (MULTIPLE VITAMIN ORAL) Take 1 capsule by mouth once daily.      NIFEdipine (PROCARDIA-XL) 30 MG (OSM) 24 hr tablet Take 1 tablet (30 mg total) by mouth every morning. 30 tablet 11    ondansetron (ZOFRAN-ODT) 8 MG TbDL Take 1 tablet (8 mg total) by mouth every 8 (eight) hours as needed (nausea). 30 tablet 1     prochlorperazine (COMPAZINE) 5 MG tablet Take 1 tablet (5 mg total) by mouth every 6 (six) hours as needed for Nausea. 20 tablet 5    ramipriL (ALTACE) 10 MG capsule Take 10 mg by mouth once daily.      tamsulosin (FLOMAX) 0.4 mg Cap Take 0.4 mg by mouth once daily.      triamcinolone acetonide 0.1% (KENALOG) 0.1 % cream APPLY TOPICALLY 2 (TWO) TIMES DAILY. 80 g 1     Current Facility-Administered Medications on File Prior to Visit   Medication Dose Route Frequency Provider Last Rate Last Admin    [COMPLETED] gadobenate dimeglumine (MULTIHANCE) injection 17 mL  17 mL Intravenous ONCE PRN Rhianna Zamora MD   17 mL at 02/11/25 1404      Review of Systems   Constitutional:  Positive for fatigue. Negative for appetite change and unexpected weight change.   HENT:  Negative for mouth sores.    Eyes:  Negative for visual disturbance.   Respiratory:  Negative for cough and shortness of breath.    Cardiovascular:  Negative for chest pain.   Gastrointestinal:  Negative for abdominal pain and diarrhea.   Genitourinary:  Negative for frequency.   Musculoskeletal:  Negative for back pain.   Integumentary:  Negative for rash.        +nail changes    Neurological:  Negative for headaches.   Hematological:  Negative for adenopathy.   Psychiatric/Behavioral:  The patient is not nervous/anxious.      Wt Readings from Last 3 Encounters:   02/12/25 1515 83.5 kg (184 lb)   01/15/25 0914 85.1 kg (187 lb 11.2 oz)   01/02/25 1237 87.3 kg (192 lb 6.4 oz)     Wt Readings from Last 3 Encounters:   02/12/25 1515 83.5 kg (184 lb)   01/15/25 0914 85.1 kg (187 lb 11.2 oz)   01/02/25 1237 87.3 kg (192 lb 6.4 oz)       Physical Exam  Constitutional:       General: He is awake.      Appearance: Normal appearance. He is normal weight.   HENT:      Head: Normocephalic.   Eyes:      General: Lids are normal. Vision grossly intact.      Extraocular Movements: Extraocular movements intact.      Conjunctiva/sclera: Conjunctivae normal.   Cardiovascular:       Rate and Rhythm: Normal rate and regular rhythm.      Pulses: Normal pulses.      Heart sounds: Normal heart sounds.   Pulmonary:      Effort: Pulmonary effort is normal.      Breath sounds: Normal breath sounds and air entry.   Chest:      Comments: Left CW mediport intact  Abdominal:      General: Abdomen is flat. Bowel sounds are normal.      Palpations: Abdomen is soft.   Musculoskeletal:      Cervical back: Normal range of motion and neck supple.   Lymphadenopathy:      Comments: No palpable adenopathy   Skin:     General: Skin is warm and moist.      Comments: Nail changes from Taxotere   Neurological:      General: No focal deficit present.      Mental Status: He is alert and oriented to person, place, and time.   Psychiatric:         Attention and Perception: Attention normal.         Mood and Affect: Mood normal.         Speech: Speech normal.         Behavior: Behavior is cooperative.         Thought Content: Thought content normal.         Cognition and Memory: Cognition normal.         Judgment: Judgment normal.        Lab Visit on 02/10/2025   Component Date Value    Sodium 02/10/2025 139     Potassium 02/10/2025 5.2 (H)     Chloride 02/10/2025 110 (H)     CO2 02/10/2025 25     Glucose 02/10/2025 100     Blood Urea Nitrogen 02/10/2025 22.0     Creatinine 02/10/2025 1.32 (H)     Calcium 02/10/2025 9.5     Protein Total 02/10/2025 6.8     Albumin 02/10/2025 3.7     Globulin 02/10/2025 3.1     Albumin/Globulin Ratio 02/10/2025 1.2     Bilirubin Total 02/10/2025 0.5     ALP 02/10/2025 50     ALT 02/10/2025 45     AST 02/10/2025 43 (H)     eGFR 02/10/2025 58     Anion Gap 02/10/2025 4.0     BUN/Creatinine Ratio 02/10/2025 17     Prostate Specific Antigen 02/10/2025 <0.10     WBC 02/10/2025 2.01 (L)     RBC 02/10/2025 3.47 (L)     Hgb 02/10/2025 10.8 (L)     Hct 02/10/2025 34.0 (L)     MCV 02/10/2025 98.0 (H)     MCH 02/10/2025 31.1 (H)     MCHC 02/10/2025 31.8 (L)     RDW 02/10/2025 14.6     Platelet  02/10/2025 159     MPV 02/10/2025 9.2     Neut % 02/10/2025 60.2     Lymph % 02/10/2025 21.4     Mono % 02/10/2025 10.9     Eos % 02/10/2025 6.5     Basophil % 02/10/2025 1.0     Imm Grans % 02/10/2025 0.0     Neut # 02/10/2025 1.21 (L)     Lymph # 02/10/2025 0.43 (L)     Mono # 02/10/2025 0.22     Eos # 02/10/2025 0.13     Baso # 02/10/2025 0.02     Imm Gran # 02/10/2025 0.00       Assessment:       1. Metastasis to bone    2. Metastases to the liver    3. Prostate cancer    4. Leukopenia due to antineoplastic chemotherapy    5. Antineoplastic chemotherapy induced anemia        Plan:       Patient was following with me for a mild anemia.  H/o Prostate cancer in 2018, diagnosed with recurrence 7/2024 liver and bone metastases seen on PSMA PET done for elevated PSA.    Patient underwent liver biopsy on 8/27/24, pathology c/w metastatic prostate cancer.  Started on Eliolyad 8/27/24 with urology Dr. Worthy and Darolutamide ordered, but patient has not received yet.  Per Dr. Tobias, genetic testing negative and liquid Guardant also done, will obtain results.  Dr. Tobias also ordering CARIS for liver biopsy and there were no results with therapy associations, though there were some clinical trials listed.     Recommend treatment with triplet therapy ADT with Docetaxel plus Darolutamide given multi-organ involvement/high volume metastases per [facility].   Plan for 6 cycles at 60mg/m2 given his age.    Darolutamide started on 9/8/24.     Patient was given 1 dose of XGEVA on 11/21/24, but stopped since he is still castrate-sensitive.     Cycle 1 Taxotere started on 9/19/24.   Completed Cycle 6 on 1/2/25. Overall he tolerated treatment well.      CBC today with anemia slightly improved, WBC lower with ANC 1200, platelets normal. CMP with stable mild CKD, potassium slightly high, AST 43, PSA remains undetectable.    PSMA PET 1/14/25 improved, but new lesion at L4.   MRI liver done 2/10/25 with improvement in liver metastases.   Lumbar  spine MRI pending.   Will follow-up results and call patient.  May send referral for evaluation for RT given that the lesion is new.     Plan to continue now with ADT Eligard and Darolutamide.   Next Eligard due on 2/24/25.    F/u in 1 month with repeat labs.    Previously discussed with patient and his wife incurable nature of is cancer and palliative goals of treatment.    All questions answered at this time.      Rhianna Zamora MD      Visit today included increased complexity associated with the care of the episodic problem metastatic prostate cancer, addressing and managing the longitudinal care of the patient's prostate cancer.

## 2025-02-10 ENCOUNTER — APPOINTMENT (OUTPATIENT)
Dept: RADIOLOGY | Facility: HOSPITAL | Age: 70
End: 2025-02-10
Attending: INTERNAL MEDICINE
Payer: MEDICARE

## 2025-02-10 DIAGNOSIS — C61 PROSTATE CANCER: ICD-10-CM

## 2025-02-10 DIAGNOSIS — C78.7 METASTASES TO THE LIVER: ICD-10-CM

## 2025-02-10 DIAGNOSIS — C79.51 METASTASIS TO BONE: ICD-10-CM

## 2025-02-10 PROCEDURE — A9577 INJ MULTIHANCE: HCPCS | Performed by: INTERNAL MEDICINE

## 2025-02-10 PROCEDURE — 25500020 PHARM REV CODE 255: Performed by: INTERNAL MEDICINE

## 2025-02-10 PROCEDURE — 74183 MRI ABD W/O CNTR FLWD CNTR: CPT | Mod: TC

## 2025-02-10 RX ADMIN — GADOBENATE DIMEGLUMINE 18 ML: 529 INJECTION, SOLUTION INTRAVENOUS at 08:02

## 2025-02-11 ENCOUNTER — HOSPITAL ENCOUNTER (OUTPATIENT)
Dept: RADIOLOGY | Facility: HOSPITAL | Age: 70
Discharge: HOME | End: 2025-02-11
Attending: INTERNAL MEDICINE
Payer: MEDICARE

## 2025-02-11 DIAGNOSIS — C61 PROSTATE CANCER: ICD-10-CM

## 2025-02-11 DIAGNOSIS — C79.51 METASTASIS TO BONE: ICD-10-CM

## 2025-02-11 PROCEDURE — 25500020 PHARM REV CODE 255: Performed by: INTERNAL MEDICINE

## 2025-02-11 PROCEDURE — 72158 MRI LUMBAR SPINE W/O & W/DYE: CPT | Mod: TC

## 2025-02-11 PROCEDURE — A9577 INJ MULTIHANCE: HCPCS | Performed by: INTERNAL MEDICINE

## 2025-02-11 RX ADMIN — GADOBENATE DIMEGLUMINE 17 ML: 529 INJECTION, SOLUTION INTRAVENOUS at 05:02

## 2025-02-12 ENCOUNTER — OFFICE VISIT (OUTPATIENT)
Dept: HEMATOLOGY/ONCOLOGY | Facility: CLINIC | Age: 70
End: 2025-02-12
Payer: MEDICARE

## 2025-02-12 VITALS
SYSTOLIC BLOOD PRESSURE: 139 MMHG | RESPIRATION RATE: 14 BRPM | HEIGHT: 68 IN | TEMPERATURE: 98 F | DIASTOLIC BLOOD PRESSURE: 64 MMHG | BODY MASS INDEX: 27.89 KG/M2 | HEART RATE: 67 BPM | OXYGEN SATURATION: 100 % | WEIGHT: 184 LBS

## 2025-02-12 DIAGNOSIS — C79.51 METASTASIS TO BONE: Primary | ICD-10-CM

## 2025-02-12 DIAGNOSIS — D70.1 LEUKOPENIA DUE TO ANTINEOPLASTIC CHEMOTHERAPY: ICD-10-CM

## 2025-02-12 DIAGNOSIS — C61 PROSTATE CANCER: ICD-10-CM

## 2025-02-12 DIAGNOSIS — T45.1X5A ANTINEOPLASTIC CHEMOTHERAPY INDUCED ANEMIA: ICD-10-CM

## 2025-02-12 DIAGNOSIS — T45.1X5A LEUKOPENIA DUE TO ANTINEOPLASTIC CHEMOTHERAPY: ICD-10-CM

## 2025-02-12 DIAGNOSIS — D64.81 ANTINEOPLASTIC CHEMOTHERAPY INDUCED ANEMIA: ICD-10-CM

## 2025-02-12 DIAGNOSIS — C78.7 METASTASES TO THE LIVER: ICD-10-CM

## 2025-02-12 PROCEDURE — 99999 PR PBB SHADOW E&M-EST. PATIENT-LVL IV: CPT | Mod: PBBFAC,,, | Performed by: INTERNAL MEDICINE

## 2025-02-12 PROCEDURE — 99214 OFFICE O/P EST MOD 30 MIN: CPT | Mod: PBBFAC | Performed by: INTERNAL MEDICINE

## 2025-02-12 PROCEDURE — 99215 OFFICE O/P EST HI 40 MIN: CPT | Mod: S$PBB,,, | Performed by: INTERNAL MEDICINE

## 2025-02-12 PROCEDURE — G2211 COMPLEX E/M VISIT ADD ON: HCPCS | Mod: S$PBB,,, | Performed by: INTERNAL MEDICINE

## 2025-02-14 ENCOUNTER — TELEPHONE (OUTPATIENT)
Dept: HEMATOLOGY/ONCOLOGY | Facility: CLINIC | Age: 70
End: 2025-02-14
Payer: MEDICARE

## 2025-02-14 NOTE — TELEPHONE ENCOUNTER
MRI lumbar spine with sclerotic lesion in L4, not causing any local extension or other problems, he also does have a lot of degenerative disc disease which would most likely be causing some mild pain.   I think we can hold off on radiation at this time.  I called patient and gave him results.    Rhianna Zamora MD

## 2025-03-06 NOTE — PROGRESS NOTES
Subjective:       Patient ID: Juan Mayo is a 69 y.o. male.    PCP: Dr. aDlton Ang  Urology: Dr. Luis Worthy    1. Recurrence Stage IV--2024  H/o Prostate Cancer Stage IIA ([id number])--Diagnosed   Biopsy/pathology:  Prostate biopsy 2018--10/12 cores positive for adenocarcinoma, Marble 7.  CT-guided liver biopsy done 24--metastatic carcinoma c/w prostate primary site. CARIS with +Genomic LOUANN high, AR positive 2+ 100%, TMPRSS2 pathogenic fusion, TP53 pathogenic variant Exon 7, AKT1 pathogenic variant, APC pathogenic variant, MYC amplified, BRAF negative, CLARITA, NTRK fusion negative, RET negative, TMB low, SREEKANTH negative, BRCA1/2 negative (Clinical trials listed on [facility]IS).  Guardant liquid biopsy done 24--[id number] mutation (drug Capivasertib FDA-approved for breast cancer), TP53 mutation, APC mutation, MYC amplification, EGFR amplification.   Imagin. NM Bone Scan 18 at [facility]--small focus of increased activity overlies posterior right 10th rib, more intense focus of asymmetric activity right of midline T1 segment laterally, most typical degenerative origin, no findings elsewhere.  2. Prostate MRI w/ and w/o contrast [facility] 18--biopsy-proven high volume disease largely obscured by extensive biopsy-related hemorrhage, highest volume of disease right apex, no gross extracapsular disease, no seminal vesicle invasion or neurovascular bundle involvement, no skeletal or ranjeet metastases.  3. PSMA PET/CT on 24--Low level uptake within prominent pelvic lymph nodes suspicious for metastatic disease. Activity above baseline involving the cervical, thoracic and lumbar spine as well as the right ischial tuberosity compatible with osseous metastatic disease. Innumerable photopenic, hypodense hepatic lesions suspicious for metastatic disease. Correlate with MR.  Pulmonary emphysema. Emphysema on CT is an independent risk factor for lung cancer. Low-dose cancer screening to be considered in the future, if  patient does not already enrolled in such a program.   4. MRI abdomen w&w/o contrast on 8/29/24--Numerous liver metastases noted.  Greater than 30 lesions seen, appear roughly larger and more numerous compared to prior scan in 7/2024, index lesion measured in the right posterolateral aspect of the right hepatic lobe measuring 3.8 cm, gallbladder and biliary ductal system normal, pancreas normal, possible periaortic lymph node on the left measuring 1.2 cm, numerous metastatic liver lesions.  Lesions appear roughly larger and more numerous compared to a prior noncontrast CT exam from 7/19/24, possible single lymph node metastasis left periaortic region.  5. PSMA PET done 1/14/25--Nonspecific small area of hypermetabolism in the central prostate, could be artifactual from urine in the urethra, 3 osseous lesions have decreased radiotracer activity since July.  New 23 mm hypermetabolic lesion in the L4 vertebral body. Hypodense liver lesions stable to slightly smaller on the CT images with no defined hypermetabolism.  6. MRI abdomen w/ and w/o contrast done 2/10/25--Interval treatment response with decreased size of bilobar hepatic lesions and decreased enhancement at osseous lesions.  7. MRI lumbar spine 2/11/25--Sclerotic lesion in the L4 vertebral body consistent with osseous metastasis as seen on comparison PET-CT.  No evidence of epidural or paraspinal extension.  No evidence of new metastatic disease in the lumbar spine. Moderate degenerate narrowing the spinal canal at L4-5, mild at L1-L4 and L5-S1. Multilevel neural foraminal stenoses as described.     2. Anemia--Since 2019    Genetic testing:  Eliezer done and negative.     Work-up:  2/2020--retic 1.4.  2/2021--iron saturation 22%, ferritin 260, vitamin B12 678.  3/2021--LDH normal, Haptoglobin normal, retic 1.9 (low-normal), folic acid normal, iron saturation 25%, ferritin 200, Radha negative, TSH normal 0.81, peripheral smear with normocytic anemia w/o  anisocytosis.  6/2021--SPEP no M-spike, MAY negative, RF normal.    PSA:  01/2021--0.09, testosterone 199 (normal 343-1275).  08/27/24--113.73  09/17/24--30.63  10/02/24--4.74  10/30/24--0.85  12/17/24--0.17  01/09/25--<0.10  02/10/25--<0.10  03/10/25--<0.10    Procedures:  Mediport placement by Dr. Victor Hugo Gutierrez on 10/7/24.     Treatment history:  1. Prostate radiation 9/25/18--11/27/18.  2. Lupron/ADT X 1 year--completed in 7/2019.   3. XGEVA x 1 dose given 11/21/24 (stopped since still castrate sensitive).  4. Taxotere every 3 weeks X 6 cycles completed 9/19/24--1/2/25.     Treatment plan:  Eligard 45mg subQ every 6 months started 8/27/24, changed to Orgovyx 2/2025  Darolutamide 600mg PO BID started on 9/8/24.     Chief Complaint: Other Misc (Pt reports no concerns today./)    HPI   Patient presents for a treatment visit for his recurrent prostate cancer. He is doing well. No complaints. Has occ back pain when he does too much, but otherwise is feeling much better getting over having had his chemotherapy. He was changed to Orgovyx from Eligard. Continues on Darolutamide. No side effects.     Past Medical History:   Diagnosis Date    Anemia, unspecified     Bilateral carotid artery stenosis     Essential (primary) hypertension     Fatigue     Prostate cancer     Urinary retention     Vitamin D deficiency       Review of patient's allergies indicates:  No Known Allergies   Current Outpatient Medications on File Prior to Visit   Medication Sig Dispense Refill    aspirin 81 mg Cap Take 81 mg by mouth.      atorvastatin (LIPITOR) 20 MG tablet Take 1 tablet (20 mg total) by mouth every evening. 30 tablet 11    cholecalciferol, vitamin D3, (VITAMIN D3) 25 mcg (1,000 unit) capsule Take 25 mcg by mouth.      darolutamide 300 mg Tab Take 300 mg by mouth 2 (two) times daily.      fenofibrate 160 MG Tab TAKE 1 TABLET (160 MG TOTAL) BY MOUTH ONCE DAILY. 30 tablet 11    folic acid (FOLVITE) 1 MG tablet Take 1 tablet (1 mg total) by  mouth once daily. 90 tablet 3    multivitamin (MULTIPLE VITAMIN ORAL) Take 1 capsule by mouth once daily.      NIFEdipine (PROCARDIA-XL) 30 MG (OSM) 24 hr tablet Take 1 tablet (30 mg total) by mouth every morning. 30 tablet 11    ORGOVYX 120 mg Tab       ramipriL (ALTACE) 10 MG capsule Take 10 mg by mouth once daily.      tamsulosin (FLOMAX) 0.4 mg Cap Take 0.4 mg by mouth once daily.      ondansetron (ZOFRAN-ODT) 8 MG TbDL Take 1 tablet (8 mg total) by mouth every 8 (eight) hours as needed (nausea). (Patient not taking: Reported on 3/12/2025) 30 tablet 1    prochlorperazine (COMPAZINE) 5 MG tablet Take 1 tablet (5 mg total) by mouth every 6 (six) hours as needed for Nausea. (Patient not taking: Reported on 3/12/2025) 20 tablet 5    triamcinolone acetonide 0.1% (KENALOG) 0.1 % cream APPLY TOPICALLY 2 (TWO) TIMES DAILY. (Patient not taking: Reported on 3/12/2025) 80 g 1     No current facility-administered medications on file prior to visit.      Review of Systems   Constitutional:  Positive for fatigue. Negative for appetite change and unexpected weight change.   HENT:  Negative for mouth sores.    Eyes:  Negative for visual disturbance.   Respiratory:  Negative for cough and shortness of breath.    Cardiovascular:  Negative for chest pain.   Gastrointestinal:  Negative for abdominal pain and diarrhea.   Genitourinary:  Negative for frequency.   Musculoskeletal:  Negative for back pain.   Integumentary:  Negative for rash.        +nail changes    Neurological:  Negative for headaches.   Hematological:  Negative for adenopathy.   Psychiatric/Behavioral:  The patient is not nervous/anxious.      Wt Readings from Last 3 Encounters:   03/12/25 1250 85.1 kg (187 lb 9.6 oz)   02/12/25 1515 83.5 kg (184 lb)   01/15/25 0914 85.1 kg (187 lb 11.2 oz)     Wt Readings from Last 3 Encounters:   03/12/25 1250 85.1 kg (187 lb 9.6 oz)   02/12/25 1515 83.5 kg (184 lb)   01/15/25 0914 85.1 kg (187 lb 11.2 oz)       Physical  Exam  Constitutional:       General: He is awake.      Appearance: Normal appearance. He is normal weight.   HENT:      Head: Normocephalic.   Eyes:      General: Lids are normal. Vision grossly intact.      Extraocular Movements: Extraocular movements intact.      Conjunctiva/sclera: Conjunctivae normal.   Cardiovascular:      Rate and Rhythm: Normal rate and regular rhythm.      Pulses: Normal pulses.      Heart sounds: Normal heart sounds.   Pulmonary:      Effort: Pulmonary effort is normal.      Breath sounds: Normal breath sounds and air entry.   Chest:      Comments: Left CW mediport intact  Abdominal:      General: Abdomen is flat. Bowel sounds are normal.      Palpations: Abdomen is soft.   Musculoskeletal:      Cervical back: Normal range of motion and neck supple.   Lymphadenopathy:      Comments: No palpable adenopathy   Skin:     General: Skin is warm and moist.      Comments: Nail changes from Taxotere   Neurological:      General: No focal deficit present.      Mental Status: He is alert and oriented to person, place, and time.   Psychiatric:         Attention and Perception: Attention normal.         Mood and Affect: Mood normal.         Speech: Speech normal.         Behavior: Behavior is cooperative.         Thought Content: Thought content normal.         Cognition and Memory: Cognition normal.         Judgment: Judgment normal.        Lab Visit on 03/10/2025   Component Date Value    Sodium 03/10/2025 140     Potassium 03/10/2025 4.0     Chloride 03/10/2025 108 (H)     CO2 03/10/2025 25     Glucose 03/10/2025 106     Blood Urea Nitrogen 03/10/2025 27.9 (H)     Creatinine 03/10/2025 1.44 (H)     Calcium 03/10/2025 9.6     Protein Total 03/10/2025 6.9     Albumin 03/10/2025 3.9     Globulin 03/10/2025 3.0     Albumin/Globulin Ratio 03/10/2025 1.3     Bilirubin Total 03/10/2025 <0.5     ALP 03/10/2025 40     ALT 03/10/2025 20     AST 03/10/2025 25     eGFR 03/10/2025 52     Anion Gap 03/10/2025 7.0      BUN/Creatinine Ratio 03/10/2025 19     Prostate Specific Antigen 03/10/2025 <0.10     WBC 03/10/2025 2.91 (L)     RBC 03/10/2025 3.60 (L)     Hgb 03/10/2025 11.2 (L)     Hct 03/10/2025 34.2 (L)     MCV 03/10/2025 95.0 (H)     MCH 03/10/2025 31.1 (H)     MCHC 03/10/2025 32.7 (L)     RDW 03/10/2025 13.2     Platelet 03/10/2025 148     MPV 03/10/2025 9.0     Neut % 03/10/2025 63.6     Lymph % 03/10/2025 22.0     Mono % 03/10/2025 8.9     Eos % 03/10/2025 4.5     Basophil % 03/10/2025 1.0     Imm Grans % 03/10/2025 0.0     Neut # 03/10/2025 1.85 (L)     Lymph # 03/10/2025 0.64     Mono # 03/10/2025 0.26     Eos # 03/10/2025 0.13     Baso # 03/10/2025 0.03     Imm Gran # 03/10/2025 0.00       Assessment:       1. Prostate cancer    2. Metastasis to bone    3. Metastases to the liver      Plan:       Patient was following with me for a mild anemia.  H/o Prostate cancer in 2018, diagnosed with recurrence 7/2024 liver and bone metastases seen on PSMA PET done for elevated PSA.    Patient underwent liver biopsy on 8/27/24, pathology c/w metastatic prostate cancer.  Started on Eligard 8/27/24 with urology Dr. Worthy and Darolutamide ordered, but patient has not received yet.  Per Dr. Tobias, genetic testing negative and liquid Guardant also done, will obtain results.  Dr. Tobias also ordering CARIS for liver biopsy and there were no results with therapy associations, though there were some clinical trials listed.     Recommend treatment with triplet therapy ADT with Docetaxel plus Darolutamide given multi-organ involvement/high volume metastases per [facility].   Plan for 6 cycles at 60mg/m2 given his age.    Darolutamide started on 9/8/24.     Patient was given 1 dose of XGEVA on 11/21/24, but stopped since he is still castrate-sensitive.     Cycle 1 Taxotere started on 9/19/24.   Completed Cycle 6 on 1/2/25. Overall he tolerated treatment well.      CBC today with anemia slightly improved, WBC lower with ANC 1200, platelets  normal. CMP with stable mild CKD, potassium slightly high, AST 43, PSA remains undetectable.    PSMA PET 1/14/25 improved, but new lesion at L4.   MRI liver done 2/10/25 with improvement in liver metastases.   Lumbar spine MRI with sclerotic lesion at L4 only, no extension. No indication for RT.  Will watch.     Plan to continue now with ADT Eligard and Darolutamide. Changed to Orgovyx by urology in 2/2025.   Labs with improving WBC, improving anemia. CMP with stable renal insufficiency.     F/u in 1 month with repeat labs and visit.   Plan to repeat PSMA PET in 5/2025.     Previously discussed with patient and his wife incurable nature of is cancer and palliative goals of treatment.    All questions answered at this time.      Rhianna Zamora MD      Visit today included increased complexity associated with the care of the episodic problem metastatic prostate cancer, addressing and managing the longitudinal care of the patient's prostate cancer.

## 2025-03-10 ENCOUNTER — LAB VISIT (OUTPATIENT)
Dept: LAB | Facility: HOSPITAL | Age: 70
End: 2025-03-10
Attending: NURSE PRACTITIONER
Payer: MEDICARE

## 2025-03-10 DIAGNOSIS — C61 PROSTATE CANCER: ICD-10-CM

## 2025-03-10 DIAGNOSIS — C79.51 METASTASIS TO BONE: ICD-10-CM

## 2025-03-10 DIAGNOSIS — C78.7 METASTASES TO THE LIVER: ICD-10-CM

## 2025-03-10 LAB
ALBUMIN SERPL-MCNC: 3.9 G/DL (ref 3.4–4.8)
ALBUMIN/GLOB SERPL: 1.3 RATIO (ref 1.1–2)
ALP SERPL-CCNC: 40 UNIT/L (ref 40–150)
ALT SERPL-CCNC: 20 UNIT/L (ref 0–55)
ANION GAP SERPL CALC-SCNC: 7 MEQ/L
AST SERPL-CCNC: 25 UNIT/L (ref 5–34)
BASOPHILS # BLD AUTO: 0.03 X10(3)/MCL
BASOPHILS NFR BLD AUTO: 1 %
BILIRUB SERPL-MCNC: <0.5 MG/DL
BUN SERPL-MCNC: 27.9 MG/DL (ref 8.4–25.7)
CALCIUM SERPL-MCNC: 9.6 MG/DL (ref 8.8–10)
CHLORIDE SERPL-SCNC: 108 MMOL/L (ref 98–107)
CO2 SERPL-SCNC: 25 MMOL/L (ref 23–31)
CREAT SERPL-MCNC: 1.44 MG/DL (ref 0.72–1.25)
CREAT/UREA NIT SERPL: 19
EOSINOPHIL # BLD AUTO: 0.13 X10(3)/MCL (ref 0–0.9)
EOSINOPHIL NFR BLD AUTO: 4.5 %
ERYTHROCYTE [DISTWIDTH] IN BLOOD BY AUTOMATED COUNT: 13.2 % (ref 11.5–17)
GFR SERPLBLD CREATININE-BSD FMLA CKD-EPI: 52 ML/MIN/1.73/M2
GLOBULIN SER-MCNC: 3 GM/DL (ref 2.4–3.5)
GLUCOSE SERPL-MCNC: 106 MG/DL (ref 82–115)
HCT VFR BLD AUTO: 34.2 % (ref 42–52)
HGB BLD-MCNC: 11.2 G/DL (ref 14–18)
IMM GRANULOCYTES # BLD AUTO: 0 X10(3)/MCL (ref 0–0.04)
IMM GRANULOCYTES NFR BLD AUTO: 0 %
LYMPHOCYTES # BLD AUTO: 0.64 X10(3)/MCL (ref 0.6–4.6)
LYMPHOCYTES NFR BLD AUTO: 22 %
MCH RBC QN AUTO: 31.1 PG (ref 27–31)
MCHC RBC AUTO-ENTMCNC: 32.7 G/DL (ref 33–36)
MCV RBC AUTO: 95 FL (ref 80–94)
MONOCYTES # BLD AUTO: 0.26 X10(3)/MCL (ref 0.1–1.3)
MONOCYTES NFR BLD AUTO: 8.9 %
NEUTROPHILS # BLD AUTO: 1.85 X10(3)/MCL (ref 2.1–9.2)
NEUTROPHILS NFR BLD AUTO: 63.6 %
PLATELET # BLD AUTO: 148 X10(3)/MCL (ref 130–400)
PMV BLD AUTO: 9 FL (ref 7.4–10.4)
POTASSIUM SERPL-SCNC: 4 MMOL/L (ref 3.5–5.1)
PROT SERPL-MCNC: 6.9 GM/DL (ref 5.8–7.6)
PSA SERPL-MCNC: <0.1 NG/ML
RBC # BLD AUTO: 3.6 X10(6)/MCL (ref 4.7–6.1)
SODIUM SERPL-SCNC: 140 MMOL/L (ref 136–145)
WBC # BLD AUTO: 2.91 X10(3)/MCL (ref 4.5–11.5)

## 2025-03-10 PROCEDURE — 85025 COMPLETE CBC W/AUTO DIFF WBC: CPT

## 2025-03-10 PROCEDURE — 80053 COMPREHEN METABOLIC PANEL: CPT

## 2025-03-10 PROCEDURE — 84153 ASSAY OF PSA TOTAL: CPT

## 2025-03-10 PROCEDURE — 36415 COLL VENOUS BLD VENIPUNCTURE: CPT

## 2025-03-11 DIAGNOSIS — E21.3 HYPERPARATHYROIDISM: ICD-10-CM

## 2025-03-11 DIAGNOSIS — I10 HYPERTENSION, UNSPECIFIED TYPE: ICD-10-CM

## 2025-03-11 DIAGNOSIS — R73.02 IGT (IMPAIRED GLUCOSE TOLERANCE): ICD-10-CM

## 2025-03-11 DIAGNOSIS — E78.2 MIXED HYPERLIPIDEMIA: ICD-10-CM

## 2025-03-11 DIAGNOSIS — Z00.00 MEDICARE ANNUAL WELLNESS VISIT, SUBSEQUENT: Primary | ICD-10-CM

## 2025-03-12 ENCOUNTER — OFFICE VISIT (OUTPATIENT)
Dept: HEMATOLOGY/ONCOLOGY | Facility: CLINIC | Age: 70
End: 2025-03-12
Payer: MEDICARE

## 2025-03-12 VITALS
TEMPERATURE: 98 F | OXYGEN SATURATION: 100 % | DIASTOLIC BLOOD PRESSURE: 70 MMHG | SYSTOLIC BLOOD PRESSURE: 137 MMHG | HEIGHT: 68 IN | WEIGHT: 187.63 LBS | RESPIRATION RATE: 14 BRPM | BODY MASS INDEX: 28.44 KG/M2 | HEART RATE: 62 BPM

## 2025-03-12 DIAGNOSIS — C78.7 METASTASES TO THE LIVER: ICD-10-CM

## 2025-03-12 DIAGNOSIS — C79.51 METASTASIS TO BONE: ICD-10-CM

## 2025-03-12 DIAGNOSIS — C61 PROSTATE CANCER: Primary | ICD-10-CM

## 2025-03-12 PROCEDURE — 99214 OFFICE O/P EST MOD 30 MIN: CPT | Mod: PBBFAC | Performed by: INTERNAL MEDICINE

## 2025-03-12 PROCEDURE — G2211 COMPLEX E/M VISIT ADD ON: HCPCS | Mod: S$PBB,,, | Performed by: INTERNAL MEDICINE

## 2025-03-12 PROCEDURE — 99215 OFFICE O/P EST HI 40 MIN: CPT | Mod: S$PBB,,, | Performed by: INTERNAL MEDICINE

## 2025-03-12 PROCEDURE — 99999 PR PBB SHADOW E&M-EST. PATIENT-LVL IV: CPT | Mod: PBBFAC,,, | Performed by: INTERNAL MEDICINE

## 2025-03-12 RX ORDER — RELUGOLIX 120 MG/1
TABLET, FILM COATED ORAL
COMMUNITY
Start: 2025-02-27

## 2025-03-17 NOTE — PROGRESS NOTES
..Annual Exam       HPI:     Pt presents for Wellness exam.  He has been feeling well.  He sleeps well.   His appetite is good.  He is  with 1 daughter.  He retired last July.  He does not smoke.  He has a few beers daily.  He has coffee infrequently. He may have 1 soft drink every 1-2 weeks.  He does not exercise. He has not been physically active.  Urologist: Dr Worthy; January/February, follow up in 1 year.  Oncology:  Dr Rhianna Zamora; 03/12/2025  Nephro: Dr Oliva; 02/27/2025.  Colonoscopy 3/13/2024: Dr Artemio Mark, follow up in 10 years.  Vascular: Dr. Gutierrez; has not seen him in a few years.  History of bilateral carotid stenosis.    The patient's Health Maintenance was reviewed and the following appears to be due at this time:   Health Maintenance Due   Topic Date Due    Hepatitis C Screening  Never done    RSV Vaccine (Age 60+ and Pregnant patients) (1 - Risk 60-74 years 1-dose series) Never done    COVID-19 Vaccine (3 - Pfizer risk series) 07/15/2021    Annual UACr  07/05/2023    Influenza Vaccine (1) 09/01/2024       ..  Past Medical History:   Diagnosis Date    Anemia, unspecified     Bilateral carotid artery stenosis     Essential (primary) hypertension     Fatigue     Prostate cancer     Urinary retention     Vitamin D deficiency           ..  Past Surgical History:   Procedure Laterality Date    COLONOSCOPY  02/27/2004    COLONOSCOPY  02/05/2014    MEDIPORT INSERTION, SINGLE Left 10/07/2024    [place] teeth extracted            Current Outpatient Medications   Medication Instructions    aspirin 325 mg, Oral, Daily    atorvastatin (LIPITOR) 20 mg, Oral, Nightly    cholecalciferol (vitamin D3) (VITAMIN D3) 25 mcg    darolutamide (NUBEQA) 300 mg Tab 1 tablet    darolutamide 300 mg, 2 times daily    fenofibrate 160 mg, Oral, Daily    folic acid (FOLVITE) 1 mg, Oral, Daily    multivitamin (MULTIPLE VITAMIN ORAL) 1 capsule, Daily    NIFEdipine (PROCARDIA-XL) 30 mg, Oral, Every morning    ORGOVYX  120 mg, Daily    ramipriL (ALTACE) 10 mg, Daily    tamsulosin (FLOMAX) 0.4 mg, Daily         ..Social History[1]       ..  Family History   Problem Relation Name Age of Onset    Hypertension Mother Christie garcia     Stroke Mother Christie garcia     No Known Problems Father      No Known Problems Sister      No Known Problems Sister      No Known Problems Brother            ..Review of patient's allergies indicates:  No Known Allergies       ..  Immunization History   Administered Date(s) Administered    COVID-19, MRNA, LN-S, PF (Pfizer) (Purple Cap) 05/27/2021, 06/17/2021    Influenza - Quadrivalent - PF *Preferred* (6 months and older) 10/21/2020, 10/11/2021, 10/11/2022    Pneumococcal Conjugate - 13 Valent 02/15/2016    Pneumococcal Polysaccharide - 23 Valent 02/18/2019    Td (ADULT) 02/05/2003    Tdap 03/13/2012, 03/04/2022    Zoster 02/11/2014    Zoster Recombinant 02/21/2019, 03/04/2022          REVIEW OF SYSTEMS:    GENERAL: No weight loss, no weight gain, no fever, no fatigue, no chills, no night sweats, + hot flashes  HEENT: No sore throat, no ear pain, no sinus pressure, no nasal congestion, no rhinorrhea, no decreased hearing, no snoring  VISION: No vision changes, no blurry vision, no double vision, no glaucoma, no cataracts, + glasses  LAST EYE EXAM: November 2024  NECK: no LAD  CARDIAC: No chest pain, no palpitations, no dyspnea on exertion, no orthopnea  RESPIRATORY: No cough, no wheezing, no sputum production, no SOB  GI: No abdominal pain, no N/V, no heartburn, no constipation, no diarrhea, no blood in stool, (- ) family history of Colon Ca  : No dysuria, no hematuria, no frequency, no urgency, no incontinence, no testicular pain/swelling, (+ ) family history of Prostate Ca  MUSC/SKEL: No myalgia, no weakness, no edema, + arthralgia: right knee, no joint swelling/effusions  SKIN: No rashes, no hives, no itching, no sores  NEURO: No HA, no numbness, no tingling, no weakness, no dizziness  PSYCH: No  "anxiety, no depression, no irritability, no panic attacks, no s/i, no h/i, no hallucinations  ENDO: No polyuria, no polyphagia, no polydipsia  HEME: + bruising, no bleeding disorders, no signs of anemia.       PHYSICAL EXAM:    ..Visit Vitals  /66   Pulse 81   Temp 99 °F (37.2 °C)   Ht 5' 8" (1.727 m)   Wt 85.3 kg (188 lb)   SpO2 98%   BMI 28.59 kg/m²        General: Well developed, well nourished white male in no apparent distress, alert and oriented x3  Skin: No rash or abnormal lesions  HEENT: Normocephalic, PERRLA, EOMI, mouth WNL, throat WNL, nares normal, EAC and TM WNL bilaterally  Neck: FROM, no LAD, no thyroid abnormalities palpable  Chest: CTA bilaterally, no wheezes crackles or rubs  Cardiac: RRR, no murmurs, rubs, gallops  Abdomen: Soft, nontender, nondistended, NBSx4, no rebound tenderness or guarding, no HSM  Extremities: No clubbing, cyanosis, or edema. Joints WNL, +2 DP/PT pulses bilaterally  Neuro: No sensory or motor defects noted. CN II-XII intact. Gait WNL.  Genital:  Deferred  Rectal:  Deferred      1. Medicare annual wellness visit, subsequent  Overview:  Patient presents for wellness examination.   He has been feeling well.  Hematologist:  Dr. Rhianna Zamora; recent virtual visit.  Hemoglobin and hematocrit stable.  Labs reviewed.    Vascular:  Dr. Gutierrez; bilateral carotid artery stenosis.  Patien is on baby aspirin.    Urologist:  Dr. Worthy; history of prostate cancer.   Last colonoscopy 2014 with Dr. Artemio Mark; follow-up in 10 years.  Labs pending:  Lipid profile, vitamin-D an A1c.    03/19/2024:  Patient presents for wellness examination.   He has been feeling well.  Hematologist:  Dr. Rhianna Zamora; recent virtual visit.  Hemoglobin and hematocrit stable.  Labs reviewed.    Vascular:  Dr. Gutierrez; bilateral carotid artery stenosis.  Patient encouraged to resume baby aspirin.    Urologist:  Dr. Worthy; history of prostate cancer.  Last colonoscopy 3/13/2024:  Dr. Artemio Mark; " follow-up in 10 years.  Chronic renal insufficiency: Stable; followed by Dr. Oliva.  Obtain last progress note/labs.  Patient patient advised to get an RSV vaccine.    Assessment & Plan:  Patient presents for wellness examination.    He has been feeling well.    Patient with metastatic prostate cancer; has finished 6 rounds of chemotherapy.  Patient is now on androgen deprivation therapy.  Oncologist: Dr. Rhianna Zamora.  Urologist: Dr. Worthy; sees patient yearly.  Nephro: Dr Oliva; 02/27/2025.  Colonoscopy 3/13/2024: Dr Artemio Mark, follow up in 10 years.  Vascular: Dr. Gutierrez; has not seen him in a few years.  History of bilateral carotid stenosis.  Patient advised to get RSV vaccine.    Labs reviewed with patient.        2. Mixed hyperlipidemia  Overview:  Patient has been compliant with fenofibrate and Lipitor; refills sent.    Continue low-cholesterol/low-fat diet.   Patient encouraged to lose weight.  Lipid profile pending.    03/19/2024:  Patient has been compliant with medications; refills sent.    Continue low-cholesterol/low-fat diet.    Patient encouraged to lose weight.    Obtain recent labs from his nephrologist, Dr. Oliva.    Assessment & Plan:  Lipid profile: Total cholesterol 160, HDL 57, triglycerides 104 and LDL 82.  Continue fenofibrate and atorvastatin; refills sent.    Continue low-cholesterol/low-fat diet.    Patient encouraged to lose weight.      3. Essential (primary) hypertension  Overview:  Controlled; continue current medication.  Refills sent.    Limit sodium consumption.    02/22/2024:  Medications recently adjusted by Dr. Oliva.  His blood pressure is well controlled.     Assessment & Plan:  His blood pressure is controlled; continue current medications.    Followed by Dr. Oliva.      4. Bilateral carotid artery stenosis  Overview:  Followed by Dr. Gutierrez.  Patient is on a baby aspirin.    03/19/2024:  Patient has not seen Dr. Gutierrez in a few years.  Patient encouraged to  follow-up with him.    Patient advised to resume baby aspirin; this was held when patient had recent hematuria.      5. Prostate cancer  Overview:  Stable; followed by Dr. Worthy.    03/24/2025:  Followed by Dr. Rhianna Zamora.   Patient has finished 6 courses of chemotherapy.    He is now on Nubequ and Orgovyx.       6. Metastases to the liver  Overview:  Stable; followed by Dr. Rhianna Zamora.      7. Metastasis to bone  Overview:  Stable; followed by Dr. Rhianna Zamora.        8. Stage 3a chronic kidney disease  Overview:  03/24/2025:  BUN/creatinine 03/10/2025: 27.9/1.44; GFR 52.  Stable; followed by Dr. Oliva.      9. Anemia in stage 3a chronic kidney disease  Overview:  Formatting of this note might be different from the original.  Last Assessment & Plan:   Formatting of this note might be different from the original.  Followed by Dr. Rhianna Zamora; labs ordered.    Last Assessment & Plan:   Formatting of this note might be different from the original.  Followed by Dr. Rhianna Zamora    Current hemoglobin and hematocrit 12.3 and 38.3.    03/19/2024:  Hemoglobin and hematocrit 2/14/2024: 11.7/36.1.  Followed by hematologist, Dr. Rhianna Zamora    Assessment & Plan:   2025:  White count 2.91.  Hemoglobin/hematocrit: 11.2/34.2.  Platelet count 148.    Followed by Dr. Rhianna Zamora.      10. Vitamin D deficiency  Overview:  Patient is on 1000 units daily.    Vitamin-D level pending.    03/19/2024:  Will obtain last progress note/labs from Dr. Oliva.    Assessment & Plan:  Vitamin-D 11/27/2024: 42.      11. Hyperparathyroidism  Overview:  Secondary to chronic kidney disease; followed by Dr. Oliva.    Assessment & Plan:  Secondary to chronic kidney disease; followed by Dr. Oliva.       12. Folic acid deficiency  Overview:  03/24/2025:  Folate 16.4 on 11/27/2024; followed by Dr. Rhianna Zamora.  Patient is on folic acid 1 mg daily.      13. Pulmonary emphysema, unspecified emphysema type  Overview:  03/24/2025:  Incidentally noted on PET scan 2024.  Patient denies any cough, wheezing or shortness for breath.  Patient asymptomatic; therefore, treatment not indicated.        14. Chronic pain of right knee  Assessment & Plan:  Patient continues to have any pain at times; he takes Tylenol as needed.           ..Follow up in about 1 year (around 3/24/2026) for Wellness.     Future Appointments   Date Time Provider Department Center   3/27/2025  8:30 AM INJECTION CHAIR 02, [facility] CHEMOTHERAPY INFUSION [facility] CHEMO [facility]   4/7/2025  7:30 AM LAB, [facility] LAB [facility]   4/10/2025  9:00 AM Rhianna Zamora MD [facility] HEMONC [facility]   6/19/2025  8:30 AM INJECTION CHAIR 01, [facility] CHEMOTHERAPY INFUSION [facility] CHEMO [facility]   3/27/2026  7:30 AM Dalton Ang MD [facility]            [1]   Social History  Socioeconomic History    Marital status:     Number of children: 1   Occupational History    Occupation: Mechanical work: [employer]   Tobacco Use    Smoking status: Never    Smokeless tobacco: Never   Substance and Sexual Activity    Alcohol use: Yes     Alcohol/week: 80.0 standard drinks of alcohol     Types: 80 Cans of beer per week     Comment: Beer Daily    Drug use: Never    Sexual activity: Not Currently     Social Drivers of Health     Financial Resource Strain: Low Risk  (3/24/2025)    Overall Financial Resource Strain (CARDIA)     Difficulty of Paying Living Expenses: Not hard at all   Food Insecurity: No Food Insecurity (3/24/2025)    Hunger Vital Sign     Worried About Running Out of Food in the Last Year: Never true     Ran Out of Food in the Last Year: Never true   Transportation Needs: No Transportation Needs (3/24/2025)    PRAPARE - Transportation     Lack of Transportation (Medical): No     Lack of Transportation (Non-Medical): No   Physical Activity: Inactive (3/24/2025)    Exercise Vital Sign     Days of Exercise per Week: 0 days     Minutes of Exercise per Session: 0 min   Stress:  No Stress Concern Present (3/24/2025)    [place] [employer] - Occupational Stress Questionnaire     Feeling of Stress : Not at all   Housing Stability: Low Risk  (3/24/2025)    Housing Stability Vital Sign     Unable to Pay for Housing in the Last Year: No     Homeless in the Last Year: No

## 2025-03-20 ENCOUNTER — LAB VISIT (OUTPATIENT)
Dept: LAB | Facility: HOSPITAL | Age: 70
End: 2025-03-20
Attending: FAMILY MEDICINE
Payer: MEDICARE

## 2025-03-20 DIAGNOSIS — R73.02 IGT (IMPAIRED GLUCOSE TOLERANCE): ICD-10-CM

## 2025-03-20 DIAGNOSIS — Z00.00 MEDICARE ANNUAL WELLNESS VISIT, SUBSEQUENT: ICD-10-CM

## 2025-03-20 DIAGNOSIS — E78.2 MIXED HYPERLIPIDEMIA: ICD-10-CM

## 2025-03-20 LAB
BACTERIA #/AREA URNS AUTO: ABNORMAL /HPF
BILIRUB UR QL STRIP.AUTO: NEGATIVE
CHOLEST SERPL-MCNC: 160 MG/DL
CHOLEST/HDLC SERPL: 3 {RATIO} (ref 0–5)
CLARITY UR: CLEAR
COLOR UR AUTO: COLORLESS
EST. AVERAGE GLUCOSE BLD GHB EST-MCNC: 102.5 MG/DL
GLUCOSE UR QL STRIP: NORMAL
HBA1C MFR BLD: 5.2 %
HDLC SERPL-MCNC: 57 MG/DL (ref 35–60)
HGB UR QL STRIP: NEGATIVE
KETONES UR QL STRIP: NEGATIVE
LDLC SERPL CALC-MCNC: 82 MG/DL (ref 50–140)
LEUKOCYTE ESTERASE UR QL STRIP: NEGATIVE
MUCOUS THREADS URNS QL MICRO: ABNORMAL /LPF
NITRITE UR QL STRIP: NEGATIVE
PH UR STRIP: 6.5 [PH]
PROT UR QL STRIP: NEGATIVE
RBC #/AREA URNS AUTO: ABNORMAL /HPF
SP GR UR STRIP.AUTO: 1.01 (ref 1–1.03)
SQUAMOUS #/AREA URNS LPF: ABNORMAL /HPF
TRIGL SERPL-MCNC: 104 MG/DL (ref 34–140)
UROBILINOGEN UR STRIP-ACNC: NORMAL
VLDLC SERPL CALC-MCNC: 21 MG/DL
WBC #/AREA URNS AUTO: ABNORMAL /HPF

## 2025-03-20 PROCEDURE — 83036 HEMOGLOBIN GLYCOSYLATED A1C: CPT

## 2025-03-20 PROCEDURE — 81001 URINALYSIS AUTO W/SCOPE: CPT

## 2025-03-20 PROCEDURE — 80061 LIPID PANEL: CPT

## 2025-03-20 PROCEDURE — 36415 COLL VENOUS BLD VENIPUNCTURE: CPT

## 2025-03-24 ENCOUNTER — OFFICE VISIT (OUTPATIENT)
Dept: PRIMARY CARE CLINIC | Facility: CLINIC | Age: 70
End: 2025-03-24
Payer: MEDICARE

## 2025-03-24 VITALS
DIASTOLIC BLOOD PRESSURE: 66 MMHG | HEART RATE: 81 BPM | OXYGEN SATURATION: 98 % | BODY MASS INDEX: 28.49 KG/M2 | WEIGHT: 188 LBS | SYSTOLIC BLOOD PRESSURE: 133 MMHG | TEMPERATURE: 99 F | HEIGHT: 68 IN

## 2025-03-24 DIAGNOSIS — I65.23 BILATERAL CAROTID ARTERY STENOSIS: ICD-10-CM

## 2025-03-24 DIAGNOSIS — N18.31 ANEMIA IN STAGE 3A CHRONIC KIDNEY DISEASE: ICD-10-CM

## 2025-03-24 DIAGNOSIS — G89.29 CHRONIC PAIN OF RIGHT KNEE: ICD-10-CM

## 2025-03-24 DIAGNOSIS — C78.7 METASTASES TO THE LIVER: ICD-10-CM

## 2025-03-24 DIAGNOSIS — E55.9 VITAMIN D DEFICIENCY: ICD-10-CM

## 2025-03-24 DIAGNOSIS — J43.9 PULMONARY EMPHYSEMA, UNSPECIFIED EMPHYSEMA TYPE: ICD-10-CM

## 2025-03-24 DIAGNOSIS — C79.51 METASTASIS TO BONE: ICD-10-CM

## 2025-03-24 DIAGNOSIS — Z00.00 MEDICARE ANNUAL WELLNESS VISIT, SUBSEQUENT: Primary | ICD-10-CM

## 2025-03-24 DIAGNOSIS — E78.2 MIXED HYPERLIPIDEMIA: ICD-10-CM

## 2025-03-24 DIAGNOSIS — Z12.5 SCREENING FOR PROSTATE CANCER: ICD-10-CM

## 2025-03-24 DIAGNOSIS — D63.1 ANEMIA IN STAGE 3A CHRONIC KIDNEY DISEASE: ICD-10-CM

## 2025-03-24 DIAGNOSIS — I10 ESSENTIAL (PRIMARY) HYPERTENSION: ICD-10-CM

## 2025-03-24 DIAGNOSIS — N18.31 STAGE 3A CHRONIC KIDNEY DISEASE: ICD-10-CM

## 2025-03-24 DIAGNOSIS — Z00.00 WELLNESS EXAMINATION: Primary | ICD-10-CM

## 2025-03-24 DIAGNOSIS — C61 PROSTATE CANCER: ICD-10-CM

## 2025-03-24 DIAGNOSIS — E53.8 FOLIC ACID DEFICIENCY: ICD-10-CM

## 2025-03-24 DIAGNOSIS — R73.02 IGT (IMPAIRED GLUCOSE TOLERANCE): ICD-10-CM

## 2025-03-24 DIAGNOSIS — E21.3 HYPERPARATHYROIDISM: ICD-10-CM

## 2025-03-24 DIAGNOSIS — M25.561 CHRONIC PAIN OF RIGHT KNEE: ICD-10-CM

## 2025-03-24 RX ORDER — DAROLUTAMIDE 300 MG/1
1 TABLET, FILM COATED ORAL
COMMUNITY

## 2025-03-24 NOTE — ASSESSMENT & PLAN NOTE
Patient presents for wellness examination.    He has been feeling well.    Patient with metastatic prostate cancer; has finished 6 rounds of chemotherapy.  Patient is now on androgen deprivation therapy.  Oncologist: Dr. Rhianna Zamora.  Urologist: Dr. Worthy; sees patient yearly.  Nephro: Dr Oliva; 02/27/2025.  Colonoscopy 3/13/2024: Dr Artemio Mark, follow up in 10 years.  Vascular: Dr. Gutierrez; has not seen him in a few years.  History of bilateral carotid stenosis.  Patient advised to get RSV vaccine.    Labs reviewed with patient.

## 2025-03-24 NOTE — ASSESSMENT & PLAN NOTE
2025:  White count 2.91.  Hemoglobin/hematocrit: 11.2/34.2.  Platelet count 148.    Followed by Dr. Rhianna Zamora.

## 2025-03-24 NOTE — ASSESSMENT & PLAN NOTE
Lipid profile: Total cholesterol 160, HDL 57, triglycerides 104 and LDL 82.  Continue fenofibrate and atorvastatin; refills sent.    Continue low-cholesterol/low-fat diet.    Patient encouraged to lose weight.

## 2025-03-27 ENCOUNTER — INFUSION (OUTPATIENT)
Dept: INFUSION THERAPY | Facility: HOSPITAL | Age: 70
End: 2025-03-27
Attending: INTERNAL MEDICINE
Payer: MEDICARE

## 2025-03-27 VITALS — DIASTOLIC BLOOD PRESSURE: 48 MMHG | SYSTOLIC BLOOD PRESSURE: 119 MMHG | HEART RATE: 77 BPM | TEMPERATURE: 98 F

## 2025-03-27 DIAGNOSIS — C61 PROSTATE CANCER: Primary | ICD-10-CM

## 2025-03-27 PROCEDURE — 63600175 PHARM REV CODE 636 W HCPCS: Performed by: INTERNAL MEDICINE

## 2025-03-27 PROCEDURE — 96523 IRRIG DRUG DELIVERY DEVICE: CPT

## 2025-03-27 RX ORDER — SODIUM CHLORIDE 0.9 % (FLUSH) 0.9 %
10 SYRINGE (ML) INJECTION
Status: DISCONTINUED | OUTPATIENT
Start: 2025-03-27 | End: 2025-03-27 | Stop reason: HOSPADM

## 2025-03-27 RX ORDER — HEPARIN 100 UNIT/ML
500 SYRINGE INTRAVENOUS
Status: DISCONTINUED | OUTPATIENT
Start: 2025-03-27 | End: 2025-03-27 | Stop reason: HOSPADM

## 2025-03-27 RX ORDER — SODIUM CHLORIDE 0.9 % (FLUSH) 0.9 %
10 SYRINGE (ML) INJECTION
OUTPATIENT
Start: 2025-04-21

## 2025-03-27 RX ORDER — HEPARIN 100 UNIT/ML
500 SYRINGE INTRAVENOUS
OUTPATIENT
Start: 2025-04-21

## 2025-03-27 RX ADMIN — HEPARIN 500 UNITS: 100 SYRINGE at 08:03

## 2025-03-31 PROBLEM — Z71.89 ADVANCED CARE PLANNING/COUNSELING DISCUSSION: Status: ACTIVE | Noted: 2025-03-31

## 2025-04-01 DIAGNOSIS — E78.2 MIXED HYPERLIPIDEMIA: ICD-10-CM

## 2025-04-01 RX ORDER — ATORVASTATIN CALCIUM 20 MG/1
20 TABLET, FILM COATED ORAL NIGHTLY
Qty: 30 TABLET | Refills: 11 | Status: SHIPPED | OUTPATIENT
Start: 2025-04-01

## 2025-04-05 NOTE — PROGRESS NOTES
Subjective:       Patient ID: Juan Mayo is a 69 y.o. male.    PCP: Dr. Dalton Ang  Urology: Dr. Luis Worthy    1. Recurrence Stage IV--2024  H/o Prostate Cancer Stage IIA ([id number])--Diagnosed   Biopsy/pathology:  Prostate biopsy 2018--10/12 cores positive for adenocarcinoma, [place] 7.  CT-guided liver biopsy done 24--metastatic carcinoma c/w prostate primary site. CARIS with +Genomic LOUANN high, AR positive 2+ 100%, TMPRSS2 pathogenic fusion, TP53 pathogenic variant Exon 7, AKT1 pathogenic variant, APC pathogenic variant, MYC amplified, BRAF negative, CLARITA, NTRK fusion negative, RET negative, TMB low, SREEKANTH negative, BRCA1/2 negative (Clinical trials listed on [facility]IS).  Guardant liquid biopsy done 24--[id number] mutation (drug Capivasertib FDA-approved for breast cancer), TP53 mutation, APC mutation, MYC amplification, EGFR amplification.   Imagin. NM Bone Scan 18 at [facility]--small focus of increased activity overlies posterior right 10th rib, more intense focus of asymmetric activity right of midline T1 segment laterally, most typical degenerative origin, no findings elsewhere.  2. Prostate MRI w/ and w/o contrast [facility] 18--biopsy-proven high volume disease largely obscured by extensive biopsy-related hemorrhage, highest volume of disease right apex, no gross extracapsular disease, no seminal vesicle invasion or neurovascular bundle involvement, no skeletal or ranjeet metastases.  3. PSMA PET/CT on 24--Low level uptake within prominent pelvic lymph nodes suspicious for metastatic disease. Activity above baseline involving the cervical, thoracic and lumbar spine as well as the right ischial tuberosity compatible with osseous metastatic disease. Innumerable photopenic, hypodense hepatic lesions suspicious for metastatic disease. Correlate with MR.  Pulmonary emphysema. Emphysema on CT is an independent risk factor for lung cancer. Low-dose cancer screening to be considered in the future, if  patient does not already enrolled in such a program.   4. MRI abdomen w&w/o contrast on 8/29/24--Numerous liver metastases noted.  Greater than 30 lesions seen, appear roughly larger and more numerous compared to prior scan in 7/2024, index lesion measured in the right posterolateral aspect of the right hepatic lobe measuring 3.8 cm, gallbladder and biliary ductal system normal, pancreas normal, possible periaortic lymph node on the left measuring 1.2 cm, numerous metastatic liver lesions.  Lesions appear roughly larger and more numerous compared to a prior noncontrast CT exam from 7/19/24, possible single lymph node metastasis left periaortic region.  5. PSMA PET done 1/14/25--Nonspecific small area of hypermetabolism in the central prostate, could be artifactual from urine in the urethra, 3 osseous lesions have decreased radiotracer activity since July.  New 23 mm hypermetabolic lesion in the L4 vertebral body. Hypodense liver lesions stable to slightly smaller on the CT images with no defined hypermetabolism.  6. MRI abdomen w/ and w/o contrast done 2/10/25--Interval treatment response with decreased size of bilobar hepatic lesions and decreased enhancement at osseous lesions.  7. MRI lumbar spine 2/11/25--Sclerotic lesion in the L4 vertebral body consistent with osseous metastasis as seen on comparison PET-CT.  No evidence of epidural or paraspinal extension.  No evidence of new metastatic disease in the lumbar spine. Moderate degenerate narrowing the spinal canal at L4-5, mild at L1-L4 and L5-S1. Multilevel neural foraminal stenoses as described.     2. Anemia--Since 2019    Genetic testing:  Eliezer done and negative.     Work-up:  2/2020--retic 1.4.  2/2021--iron saturation 22%, ferritin 260, vitamin B12 678.  3/2021--LDH normal, Haptoglobin normal, retic 1.9 (low-normal), folic acid normal, iron saturation 25%, ferritin 200, Radha negative, TSH normal 0.81, peripheral smear with normocytic anemia w/o  anisocytosis.  6/2021--SPEP no M-spike, MAY negative, RF normal.    PSA:  01/2021--0.09, testosterone 199 (normal 343-1275).  08/27/24--113.73  09/17/24--30.63  10/02/24--4.74  10/30/24--0.85  12/17/24--0.17  01/09/25--<0.10  02/10/25--<0.10  03/10/25--<0.10  04/07/25--<0.10    Procedures:  Mediport placement by Dr. Victor Hugo Gutierrez on 10/7/24.     Treatment history:  1. Prostate radiation 9/25/18--11/27/18.  2. Lupron/ADT X 1 year--completed in 7/2019.   3. XGEVA x 1 dose given 11/21/24 (stopped since still castrate sensitive).  4. Taxotere every 3 weeks X 6 cycles completed 9/19/24--1/2/25.     Treatment plan:  Eligard 45mg subQ every 6 months started 8/27/24, changed to Orgovyx 2/2025  Darolutamide 600mg PO BID started on 9/8/24.     Chief Complaint: Other Misc (Pt reports no concerns today./)    HPI   Patient presents for a treatment visit for his recurrent prostate cancer. He is doing well. No complaints. Continues on Darolutamide and Orgovyx without problems. Overall he feels good.     Past Medical History:   Diagnosis Date    Anemia, unspecified     Bilateral carotid artery stenosis     Essential (primary) hypertension     Fatigue     Prostate cancer     Urinary retention     Vitamin D deficiency       Review of patient's allergies indicates:  No Known Allergies   Current Outpatient Medications on File Prior to Visit   Medication Sig Dispense Refill    aspirin 81 mg Cap Take 325 mg by mouth Daily.      atorvastatin (LIPITOR) 20 MG tablet TAKE 1 TABLET (20 MG TOTAL) BY MOUTH EVERY EVENING. 30 tablet 11    cholecalciferol, vitamin D3, (VITAMIN D3) 25 mcg (1,000 unit) capsule Take 25 mcg by mouth.      darolutamide 300 mg Tab Take 300 mg by mouth 2 (two) times daily.      fenofibrate 160 MG Tab TAKE 1 TABLET (160 MG TOTAL) BY MOUTH ONCE DAILY. 30 tablet 11    folic acid (FOLVITE) 1 MG tablet Take 1 tablet (1 mg total) by mouth once daily. 90 tablet 3    multivitamin (MULTIPLE VITAMIN ORAL) Take 1 capsule by mouth once  daily.      NIFEdipine (PROCARDIA-XL) 30 MG (OSM) 24 hr tablet Take 1 tablet (30 mg total) by mouth every morning. 30 tablet 11    ORGOVYX 120 mg Tab Take 120 mg by mouth Daily.      ramipriL (ALTACE) 10 MG capsule Take 10 mg by mouth once daily.      tamsulosin (FLOMAX) 0.4 mg Cap Take 0.4 mg by mouth once daily.      darolutamide (NUBEQA) 300 mg Tab Take 1 tablet by mouth. (Patient not taking: Reported on 4/10/2025)       No current facility-administered medications on file prior to visit.      Review of Systems   Constitutional:  Positive for fatigue. Negative for appetite change and unexpected weight change.   HENT:  Negative for mouth sores.    Eyes:  Negative for visual disturbance.   Respiratory:  Negative for cough and shortness of breath.    Cardiovascular:  Negative for chest pain.   Gastrointestinal:  Negative for abdominal pain and diarrhea.   Genitourinary:  Negative for frequency.   Musculoskeletal:  Negative for back pain.   Integumentary:  Negative for rash.        +nail changes    Neurological:  Negative for headaches.   Hematological:  Negative for adenopathy.   Psychiatric/Behavioral:  The patient is not nervous/anxious.      Wt Readings from Last 3 Encounters:   04/10/25 0852 85.4 kg (188 lb 3.2 oz)   03/24/25 0736 85.3 kg (188 lb)   03/12/25 1250 85.1 kg (187 lb 9.6 oz)     Wt Readings from Last 3 Encounters:   04/10/25 0852 85.4 kg (188 lb 3.2 oz)   03/24/25 0736 85.3 kg (188 lb)   03/12/25 1250 85.1 kg (187 lb 9.6 oz)       Physical Exam  Constitutional:       General: He is awake.      Appearance: Normal appearance. He is normal weight.   HENT:      Head: Normocephalic.   Eyes:      General: Lids are normal. Vision grossly intact.      Extraocular Movements: Extraocular movements intact.      Conjunctiva/sclera: Conjunctivae normal.   Cardiovascular:      Rate and Rhythm: Normal rate and regular rhythm.      Pulses: Normal pulses.      Heart sounds: Normal heart sounds.   Pulmonary:       Effort: Pulmonary effort is normal.      Breath sounds: Normal breath sounds and air entry.   Chest:      Comments: Left CW mediport intact  Abdominal:      General: Abdomen is flat. Bowel sounds are normal.      Palpations: Abdomen is soft.   Musculoskeletal:      Cervical back: Normal range of motion and neck supple.   Lymphadenopathy:      Comments: No palpable adenopathy   Skin:     General: Skin is warm and moist.      Comments: Nail changes from Taxotere   Neurological:      General: No focal deficit present.      Mental Status: He is alert and oriented to person, place, and time.   Psychiatric:         Attention and Perception: Attention normal.         Mood and Affect: Mood normal.         Speech: Speech normal.         Behavior: Behavior is cooperative.         Thought Content: Thought content normal.         Cognition and Memory: Cognition normal.         Judgment: Judgment normal.        Lab Visit on 04/07/2025   Component Date Value    Sodium 04/07/2025 137     Potassium 04/07/2025 5.1     Chloride 04/07/2025 107     CO2 04/07/2025 24     Glucose 04/07/2025 99     Blood Urea Nitrogen 04/07/2025 31.3 (H)     Creatinine 04/07/2025 1.25     Calcium 04/07/2025 9.6     Protein Total 04/07/2025 7.3     Albumin 04/07/2025 4.0     Globulin 04/07/2025 3.3     Albumin/Globulin Ratio 04/07/2025 1.2     Bilirubin Total 04/07/2025 0.5     ALP 04/07/2025 36 (L)     ALT 04/07/2025 18     AST 04/07/2025 25     eGFR 04/07/2025 >60     Anion Gap 04/07/2025 6.0     BUN/Creatinine Ratio 04/07/2025 25     Prostate Specific Antigen 04/07/2025 <0.10     WBC 04/07/2025 3.67 (L)     RBC 04/07/2025 3.65 (L)     Hgb 04/07/2025 11.6 (L)     Hct 04/07/2025 34.0 (L)     MCV 04/07/2025 93.2     MCH 04/07/2025 31.8 (H)     MCHC 04/07/2025 34.1     RDW 04/07/2025 13.6     Platelet 04/07/2025 167     MPV 04/07/2025 9.3     Neut % 04/07/2025 65.9     Lymph % 04/07/2025 16.9     Mono % 04/07/2025 9.0     Eos % 04/07/2025 6.8     Basophil %  04/07/2025 1.1     Imm Grans % 04/07/2025 0.3     Neut # 04/07/2025 2.42     Lymph # 04/07/2025 0.62     Mono # 04/07/2025 0.33     Eos # 04/07/2025 0.25     Baso # 04/07/2025 0.04     Imm Gran # 04/07/2025 0.01       Assessment:       1. Prostate cancer    2. Metastasis to bone    3. Metastases to the liver    4. Leukopenia due to antineoplastic chemotherapy    5. Antineoplastic chemotherapy induced anemia      Plan:       Patient was following with me for a mild anemia.  H/o Prostate cancer in 2018, diagnosed with recurrence 7/2024 liver and bone metastases seen on PSMA PET done for elevated PSA.    Patient underwent liver biopsy on 8/27/24, pathology c/w metastatic prostate cancer.  Started on Eligard 8/27/24 with urology Dr. Worthy and Darolutamide ordered, but patient has not received yet.  Per Dr. Tobias, genetic testing negative and liquid Guardant also done, will obtain results.  Dr. Tobias also ordering CARIS for liver biopsy and there were no results with therapy associations, though there were some clinical trials listed.     Recommend treatment with triplet therapy ADT with Docetaxel plus Darolutamide given multi-organ involvement/high volume metastases per [facility].   Plan for 6 cycles at 60mg/m2 given his age.    Darolutamide started on 9/8/24.     Patient was given 1 dose of XGEVA on 11/21/24, but stopped since he is still castrate-sensitive.     Cycle 1 Taxotere started on 9/19/24.   Completed Cycle 6 on 1/2/25. Overall he tolerated treatment well.      CBC today with anemia improved, WBC improved, platelets normal. CMP with stable mild CKD, PSA remains undetectable.    PSMA PET 1/14/25 improved, but new lesion at L4.   MRI liver done 2/10/25 with improvement in liver metastases.   Lumbar spine MRI with sclerotic lesion at L4 only, no extension. No indication for RT.  Will watch.     Continue current treatment ADT Orgovyx and Darolutamide. Changed to Orgovyx by urology in 2/2025.     F/u in 1 month with  repeat labs and visit and repeat PSMA PET prior to visit for monitoring of disease.    Previously discussed with patient and his wife incurable nature of is cancer and palliative goals of treatment.    All questions answered at this time.      Rhianna Zamora MD      Visit today included increased complexity associated with the care of the episodic problem metastatic prostate cancer, addressing and managing the longitudinal care of the patient's prostate cancer.

## 2025-04-07 ENCOUNTER — LAB VISIT (OUTPATIENT)
Dept: LAB | Facility: HOSPITAL | Age: 70
End: 2025-04-07
Attending: INTERNAL MEDICINE
Payer: MEDICARE

## 2025-04-07 DIAGNOSIS — C61 PROSTATE CANCER: ICD-10-CM

## 2025-04-07 DIAGNOSIS — C79.51 METASTASIS TO BONE: ICD-10-CM

## 2025-04-07 LAB
ALBUMIN SERPL-MCNC: 4 G/DL (ref 3.4–4.8)
ALBUMIN/GLOB SERPL: 1.2 RATIO (ref 1.1–2)
ALP SERPL-CCNC: 36 UNIT/L (ref 40–150)
ALT SERPL-CCNC: 18 UNIT/L (ref 0–55)
ANION GAP SERPL CALC-SCNC: 6 MEQ/L
AST SERPL-CCNC: 25 UNIT/L (ref 11–45)
BASOPHILS # BLD AUTO: 0.04 X10(3)/MCL
BASOPHILS NFR BLD AUTO: 1.1 %
BILIRUB SERPL-MCNC: 0.5 MG/DL
BUN SERPL-MCNC: 31.3 MG/DL (ref 8.4–25.7)
CALCIUM SERPL-MCNC: 9.6 MG/DL (ref 8.8–10)
CHLORIDE SERPL-SCNC: 107 MMOL/L (ref 98–107)
CO2 SERPL-SCNC: 24 MMOL/L (ref 23–31)
CREAT SERPL-MCNC: 1.25 MG/DL (ref 0.72–1.25)
CREAT/UREA NIT SERPL: 25
EOSINOPHIL # BLD AUTO: 0.25 X10(3)/MCL (ref 0–0.9)
EOSINOPHIL NFR BLD AUTO: 6.8 %
ERYTHROCYTE [DISTWIDTH] IN BLOOD BY AUTOMATED COUNT: 13.6 % (ref 11.5–17)
GFR SERPLBLD CREATININE-BSD FMLA CKD-EPI: >60 ML/MIN/1.73/M2
GLOBULIN SER-MCNC: 3.3 GM/DL (ref 2.4–3.5)
GLUCOSE SERPL-MCNC: 99 MG/DL (ref 82–115)
HCT VFR BLD AUTO: 34 % (ref 42–52)
HGB BLD-MCNC: 11.6 G/DL (ref 14–18)
IMM GRANULOCYTES # BLD AUTO: 0.01 X10(3)/MCL (ref 0–0.04)
IMM GRANULOCYTES NFR BLD AUTO: 0.3 %
LYMPHOCYTES # BLD AUTO: 0.62 X10(3)/MCL (ref 0.6–4.6)
LYMPHOCYTES NFR BLD AUTO: 16.9 %
MCH RBC QN AUTO: 31.8 PG (ref 27–31)
MCHC RBC AUTO-ENTMCNC: 34.1 G/DL (ref 33–36)
MCV RBC AUTO: 93.2 FL (ref 80–94)
MONOCYTES # BLD AUTO: 0.33 X10(3)/MCL (ref 0.1–1.3)
MONOCYTES NFR BLD AUTO: 9 %
NEUTROPHILS # BLD AUTO: 2.42 X10(3)/MCL (ref 2.1–9.2)
NEUTROPHILS NFR BLD AUTO: 65.9 %
PLATELET # BLD AUTO: 167 X10(3)/MCL (ref 130–400)
PMV BLD AUTO: 9.3 FL (ref 7.4–10.4)
POTASSIUM SERPL-SCNC: 5.1 MMOL/L (ref 3.5–5.1)
PROT SERPL-MCNC: 7.3 GM/DL (ref 5.8–7.6)
PSA SERPL-MCNC: <0.1 NG/ML
RBC # BLD AUTO: 3.65 X10(6)/MCL (ref 4.7–6.1)
SODIUM SERPL-SCNC: 137 MMOL/L (ref 136–145)
WBC # BLD AUTO: 3.67 X10(3)/MCL (ref 4.5–11.5)

## 2025-04-07 PROCEDURE — 84153 ASSAY OF PSA TOTAL: CPT

## 2025-04-07 PROCEDURE — 36415 COLL VENOUS BLD VENIPUNCTURE: CPT

## 2025-04-07 PROCEDURE — 85025 COMPLETE CBC W/AUTO DIFF WBC: CPT

## 2025-04-07 PROCEDURE — 80053 COMPREHEN METABOLIC PANEL: CPT

## 2025-04-10 ENCOUNTER — OFFICE VISIT (OUTPATIENT)
Dept: HEMATOLOGY/ONCOLOGY | Facility: CLINIC | Age: 70
End: 2025-04-10
Payer: MEDICARE

## 2025-04-10 VITALS
HEIGHT: 68 IN | SYSTOLIC BLOOD PRESSURE: 120 MMHG | OXYGEN SATURATION: 99 % | WEIGHT: 188.19 LBS | TEMPERATURE: 98 F | DIASTOLIC BLOOD PRESSURE: 64 MMHG | BODY MASS INDEX: 28.52 KG/M2 | RESPIRATION RATE: 14 BRPM | HEART RATE: 76 BPM

## 2025-04-10 DIAGNOSIS — D64.81 ANTINEOPLASTIC CHEMOTHERAPY INDUCED ANEMIA: ICD-10-CM

## 2025-04-10 DIAGNOSIS — D70.1 LEUKOPENIA DUE TO ANTINEOPLASTIC CHEMOTHERAPY: ICD-10-CM

## 2025-04-10 DIAGNOSIS — C79.51 METASTASIS TO BONE: ICD-10-CM

## 2025-04-10 DIAGNOSIS — T45.1X5A LEUKOPENIA DUE TO ANTINEOPLASTIC CHEMOTHERAPY: ICD-10-CM

## 2025-04-10 DIAGNOSIS — T45.1X5A ANTINEOPLASTIC CHEMOTHERAPY INDUCED ANEMIA: ICD-10-CM

## 2025-04-10 DIAGNOSIS — C61 PROSTATE CANCER: Primary | ICD-10-CM

## 2025-04-10 DIAGNOSIS — C78.7 METASTASES TO THE LIVER: ICD-10-CM

## 2025-04-10 PROCEDURE — 99214 OFFICE O/P EST MOD 30 MIN: CPT | Mod: PBBFAC | Performed by: INTERNAL MEDICINE

## 2025-04-10 PROCEDURE — 99215 OFFICE O/P EST HI 40 MIN: CPT | Mod: S$PBB,,, | Performed by: INTERNAL MEDICINE

## 2025-04-10 PROCEDURE — G2211 COMPLEX E/M VISIT ADD ON: HCPCS | Mod: S$PBB,,, | Performed by: INTERNAL MEDICINE

## 2025-04-10 PROCEDURE — 99999 PR PBB SHADOW E&M-EST. PATIENT-LVL IV: CPT | Mod: PBBFAC,,, | Performed by: INTERNAL MEDICINE

## 2025-04-28 DIAGNOSIS — E78.2 MIXED HYPERLIPIDEMIA: ICD-10-CM

## 2025-04-28 RX ORDER — ATORVASTATIN CALCIUM 20 MG/1
20 TABLET, FILM COATED ORAL NIGHTLY
Qty: 30 TABLET | Refills: 11 | Status: SHIPPED | OUTPATIENT
Start: 2025-04-28

## 2025-05-04 NOTE — PROGRESS NOTES
Subjective:       Patient ID: Juan Mayo is a 69 y.o. male.    PCP: Dr. Dalton Ang  Urology: Dr. Luis Worthy    1. Recurrence Stage IV--2024  H/o Prostate Cancer Stage IIA ([id number])--Diagnosed   Biopsy/pathology:  Prostate biopsy 2018--10/12 cores positive for adenocarcinoma, [place] 7.  CT-guided liver biopsy done 24--metastatic carcinoma c/w prostate primary site. CARIS with +Genomic LOUANN high, AR positive 2+ 100%, TMPRSS2 pathogenic fusion, TP53 pathogenic variant Exon 7, AKT1 pathogenic variant, APC pathogenic variant, MYC amplified, BRAF negative, CLARITA, NTRK fusion negative, RET negative, TMB low, SREEKANTH negative, BRCA1/2 negative (Clinical trials listed on [facility]).  Guardant liquid biopsy done 24--[id number] mutation (drug Capivasertib FDA-approved for breast cancer), TP53 mutation, APC mutation, MYC amplification, EGFR amplification.   Imagin. NM Bone Scan 18 at [facility]--small focus of increased activity overlies posterior right 10th rib, more intense focus of asymmetric activity right of midline T1 segment laterally, most typical degenerative origin, no findings elsewhere.  2. Prostate MRI w/ and w/o contrast [facility] 18--biopsy-proven high volume disease largely obscured by extensive biopsy-related hemorrhage, highest volume of disease right apex, no gross extracapsular disease, no seminal vesicle invasion or neurovascular bundle involvement, no skeletal or ranjeet metastases.  3. PSMA PET/CT on 24--Low level uptake within prominent pelvic lymph nodes suspicious for metastatic disease. Activity above baseline involving the cervical, thoracic and lumbar spine as well as the right ischial tuberosity compatible with osseous metastatic disease. Innumerable photopenic, hypodense hepatic lesions suspicious for metastatic disease. Correlate with MR.  Pulmonary emphysema. Emphysema on CT is an independent risk factor for lung cancer. Low-dose cancer screening to be considered in the future, if  patient does not already enrolled in such a program.   4. MRI abdomen w&w/o contrast on 8/29/24--Numerous liver metastases noted.  Greater than 30 lesions seen, appear roughly larger and more numerous compared to prior scan in 7/2024, index lesion measured in the right posterolateral aspect of the right hepatic lobe measuring 3.8 cm, gallbladder and biliary ductal system normal, pancreas normal, possible periaortic lymph node on the left measuring 1.2 cm, numerous metastatic liver lesions.  Lesions appear roughly larger and more numerous compared to a prior noncontrast CT exam from 7/19/24, possible single lymph node metastasis left periaortic region.  5. PSMA PET done 1/14/25--Nonspecific small area of hypermetabolism in the central prostate, could be artifactual from urine in the urethra, 3 osseous lesions have decreased radiotracer activity since July.  New 23 mm hypermetabolic lesion in the L4 vertebral body. Hypodense liver lesions stable to slightly smaller on the CT images with no defined hypermetabolism.  6. MRI abdomen w/ and w/o contrast done 2/10/25--Interval treatment response with decreased size of bilobar hepatic lesions and decreased enhancement at osseous lesions.  7. MRI lumbar spine 2/11/25--Sclerotic lesion in the L4 vertebral body consistent with osseous metastasis as seen on comparison PET-CT.  No evidence of epidural or paraspinal extension.  No evidence of new metastatic disease in the lumbar spine. Moderate degenerate narrowing the spinal canal at L4-5, mild at L1-L4 and L5-S1. Multilevel neural foraminal stenoses as described.  8. PSMA PET done 5/6/25--Similar appearance of nonspecific right central predominant prostate activity, slightly increased activity at the posterior L5 sclerosis with remaining index osseous lesions of decreased PSMA-avid uptake. Less conspicuous appearance of miniscule hypodensities throughout the liver, again without significant radiotracer uptake.  Remaining  findings are grossly unchanged in the interval.     2. Anemia--Since 2019    Genetic testing:  Ambry done and negative.     Work-up:  2/2020--retic 1.4.  2/2021--iron saturation 22%, ferritin 260, vitamin B12 678.  3/2021--LDH normal, Haptoglobin normal, retic 1.9 (low-normal), folic acid normal, iron saturation 25%, ferritin 200, Radha negative, TSH normal 0.81, peripheral smear with normocytic anemia w/o anisocytosis.  6/2021--SPEP no M-spike, MAY negative, RF normal.    PSA:  01/2021--0.09, testosterone 199 (normal 343-1275).  08/27/24--113.73  09/17/24--30.63  10/02/24--4.74  10/30/24--0.85  12/17/24--0.17  01/09/25--<0.10  02/10/25--<0.10  03/10/25--<0.10  04/07/25--<0.10  05/06/25--<0.10    Procedures:  Mediport placement by Dr. Victor Hugo Gutierrez on 10/7/24.     Treatment history:  1. Prostate radiation 9/25/18--11/27/18.  2. Lupron/ADT X 1 year--completed in 7/2019.   3. XGEVA x 1 dose given 11/21/24 (stopped since still castrate sensitive).  4. Taxotere every 3 weeks X 6 cycles completed 9/19/24--1/2/25.     Treatment plan:  Eligard 45mg subQ every 6 months started 8/27/24, changed to Orgovyx 2/2025  Darolutamide 600mg PO BID started on 9/8/24.     Chief Complaint: Other Misc (Pt reports no concerns today./)    HPI   Patient presents for a treatment visit for his recurrent prostate cancer. He is doing well. No complaints aside from fatigue, which is actually improved. Continues on Darolutamide and Orgovyx without problems. Overall he feels good. PET recent with mostly improvement, some minimal increase in activity L5 sclerotic lesion. He has only some occasional back pain, but otherwise is asymptomatic. I reviewed images and discussed that the lesion does not look concerning, but will continue to monitor.    Past Medical History:   Diagnosis Date    Anemia, unspecified     Bilateral carotid artery stenosis     Essential (primary) hypertension     Fatigue     Prostate cancer     Urinary retention     Vitamin D  deficiency       Review of patient's allergies indicates:  No Known Allergies   Current Outpatient Medications on File Prior to Visit   Medication Sig Dispense Refill    aspirin 81 mg Cap Take 325 mg by mouth Daily.      atorvastatin (LIPITOR) 20 MG tablet Take 1 tablet (20 mg total) by mouth every evening. 30 tablet 11    cholecalciferol, vitamin D3, (VITAMIN D3) 25 mcg (1,000 unit) capsule Take 25 mcg by mouth.      darolutamide 300 mg Tab Take 300 mg by mouth 2 (two) times daily.      fenofibrate 160 MG Tab TAKE 1 TABLET (160 MG TOTAL) BY MOUTH ONCE DAILY. 30 tablet 11    folic acid (FOLVITE) 1 MG tablet TAKE 1 TABLET (1 MG TOTAL) BY MOUTH ONCE DAILY. 90 tablet 0    multivitamin (MULTIPLE VITAMIN ORAL) Take 1 capsule by mouth once daily.      NIFEdipine (PROCARDIA-XL) 30 MG (OSM) 24 hr tablet Take 1 tablet (30 mg total) by mouth every morning. 30 tablet 11    ORGOVYX 120 mg Tab Take 120 mg by mouth Daily.      ramipriL (ALTACE) 10 MG capsule Take 10 mg by mouth once daily.      tamsulosin (FLOMAX) 0.4 mg Cap Take 0.4 mg by mouth once daily.      darolutamide (NUBEQA) 300 mg Tab Take 1 tablet by mouth. (Patient not taking: Reported on 5/8/2025)       No current facility-administered medications on file prior to visit.      Review of Systems   Constitutional:  Positive for fatigue. Negative for appetite change and unexpected weight change.   HENT:  Negative for mouth sores.    Eyes:  Negative for visual disturbance.   Respiratory:  Negative for cough and shortness of breath.    Cardiovascular:  Negative for chest pain.   Gastrointestinal:  Negative for abdominal pain and diarrhea.   Genitourinary:  Negative for frequency.   Musculoskeletal:  Negative for back pain.   Integumentary:  Negative for rash.   Neurological:  Negative for headaches.   Hematological:  Negative for adenopathy.   Psychiatric/Behavioral:  The patient is not nervous/anxious.      Wt Readings from Last 3 Encounters:   05/08/25 0955 85.3 kg (188  lb 1.6 oz)   04/10/25 0852 85.4 kg (188 lb 3.2 oz)   03/24/25 0736 85.3 kg (188 lb)     Wt Readings from Last 3 Encounters:   05/08/25 0955 85.3 kg (188 lb 1.6 oz)   04/10/25 0852 85.4 kg (188 lb 3.2 oz)   03/24/25 0736 85.3 kg (188 lb)       Physical Exam  Constitutional:       General: He is awake.      Appearance: Normal appearance. He is normal weight.   HENT:      Head: Normocephalic.   Eyes:      General: Lids are normal. Vision grossly intact.      Extraocular Movements: Extraocular movements intact.      Conjunctiva/sclera: Conjunctivae normal.   Cardiovascular:      Rate and Rhythm: Normal rate and regular rhythm.      Pulses: Normal pulses.      Heart sounds: Normal heart sounds.   Pulmonary:      Effort: Pulmonary effort is normal.      Breath sounds: Normal breath sounds and air entry.   Chest:      Comments: Left CW mediport intact  Abdominal:      General: Abdomen is flat. Bowel sounds are normal.      Palpations: Abdomen is soft.   Musculoskeletal:      Cervical back: Normal range of motion and neck supple.   Lymphadenopathy:      Comments: No palpable adenopathy   Skin:     General: Skin is warm and moist.      Comments: Nail changes from Taxotere   Neurological:      General: No focal deficit present.      Mental Status: He is alert and oriented to person, place, and time.   Psychiatric:         Attention and Perception: Attention normal.         Mood and Affect: Mood normal.         Speech: Speech normal.         Behavior: Behavior is cooperative.         Thought Content: Thought content normal.         Cognition and Memory: Cognition normal.         Judgment: Judgment normal.        Lab Visit on 05/06/2025   Component Date Value    Sodium 05/06/2025 138     Potassium 05/06/2025 4.6     Chloride 05/06/2025 109 (H)     CO2 05/06/2025 24     Glucose 05/06/2025 97     Blood Urea Nitrogen 05/06/2025 23.6     Creatinine 05/06/2025 1.32 (H)     Calcium 05/06/2025 9.3     Protein Total 05/06/2025 7.2      Albumin 05/06/2025 3.8     Globulin 05/06/2025 3.4     Albumin/Globulin Ratio 05/06/2025 1.1     Bilirubin Total 05/06/2025 0.6     ALP 05/06/2025 39 (L)     ALT 05/06/2025 19     AST 05/06/2025 25     eGFR 05/06/2025 58     Anion Gap 05/06/2025 5.0     BUN/Creatinine Ratio 05/06/2025 18     Prostate Specific Antigen 05/06/2025 <0.10     WBC 05/06/2025 3.44 (L)     RBC 05/06/2025 3.11 (L)     Hgb 05/06/2025 10.0 (L)     Hct 05/06/2025 29.4 (L)     MCV 05/06/2025 94.5 (H)     MCH 05/06/2025 32.2 (H)     MCHC 05/06/2025 34.0     RDW 05/06/2025 15.1     Platelet 05/06/2025 169     MPV 05/06/2025 9.2     Neut % 05/06/2025 67.3     Lymph % 05/06/2025 16.9     Mono % 05/06/2025 10.5     Eos % 05/06/2025 4.4     Basophil % 05/06/2025 0.6     Imm Grans % 05/06/2025 0.3     Neut # 05/06/2025 2.32     Lymph # 05/06/2025 0.58 (L)     Mono # 05/06/2025 0.36     Eos # 05/06/2025 0.15     Baso # 05/06/2025 0.02     Imm Gran # 05/06/2025 0.01       Assessment:       1. Prostate cancer    2. Metastasis to bone    3. Metastases to the liver    4. Anemia, unspecified type    5. Anemia in stage 3a chronic kidney disease        Plan:       Patient was following with me for a mild anemia.  H/o Prostate cancer in 2018, diagnosed with recurrence 7/2024 liver and bone metastases seen on PSMA PET done for elevated PSA.    Patient underwent liver biopsy on 8/27/24, pathology c/w metastatic prostate cancer.  Started on Eligard 8/27/24 with urology Dr. Worthy and [place] ordered, but patient has not received yet.  Per Dr. Tobias, genetic testing negative and liquid Guardant also done, will obtain results.  Dr. Tobias also ordering CARIS for liver biopsy and there were no results with therapy associations, though there were some clinical trials listed.     Recommend treatment with triplet therapy ADT with Docetaxel plus Darolutamide given multi-organ involvement/high volume metastases per [facility].   Plan for 6 cycles at 60mg/m2 given his  age.    Darolutamide started on 9/8/24.     Patient was given 1 dose of XGEVA on 11/21/24, but stopped since he is still castrate-sensitive.     Cycle 1 Taxotere started on 9/19/24.   Completed Cycle 6 on 1/2/25. Overall he tolerated treatment very well.      PSMA PET 1/14/25 improved, but new lesion at L4.   MRI liver done 2/10/25 with improvement in liver metastases.  PSMA PET 5/6/25 with improved findings, slight increase in activity at L5, otherwise stable.    CBC today with anemia slightly worse, WBC mildly low, ANC normal, platelets normal. CMP with stable mild CKD, PSA remains undetectable.  Suspect anemia may be from CKD and also his cancer.  However, will check anemia labs on RTC. Also instructed to start on MVI with iron.     I did review images from PET for L5 lesion, and does not look concerning at this time.  Plan to monitor closely. Plan to possibly repeat PSMA PET in 4 months.    Continue current treatment ADT Orgovyx and Darolutamide. Changed to Orgovyx by urology in 2/2025.     F/u in 1 month with repeat labs and visit.      Previously discussed with patient and his wife incurable nature of is cancer and palliative goals of treatment.    All questions answered at this time.      Rhianna Zamora MD     - code applied: patient requires or will require a continuous, longitudinal, and active collaborative plan of care related to this patient's health condition, metastatic prostate cancer --the management of which requires the direction of a practitioner with specialized clinical knowledge, skill, and expertise, as well as careful review of imaging to assess disease and  treatment response.

## 2025-05-05 DIAGNOSIS — D63.1 ANEMIA DUE TO CHRONIC KIDNEY DISEASE, UNSPECIFIED CKD STAGE: ICD-10-CM

## 2025-05-05 DIAGNOSIS — N18.9 ANEMIA DUE TO CHRONIC KIDNEY DISEASE, UNSPECIFIED CKD STAGE: ICD-10-CM

## 2025-05-05 DIAGNOSIS — E53.8 FOLIC ACID DEFICIENCY: ICD-10-CM

## 2025-05-05 RX ORDER — FOLIC ACID 1 MG/1
1 TABLET ORAL DAILY
Qty: 90 TABLET | Refills: 0 | Status: SHIPPED | OUTPATIENT
Start: 2025-05-05

## 2025-05-06 ENCOUNTER — HOSPITAL ENCOUNTER (OUTPATIENT)
Dept: RADIOLOGY | Facility: HOSPITAL | Age: 70
Discharge: HOME | End: 2025-05-06
Attending: INTERNAL MEDICINE
Payer: MEDICARE

## 2025-05-06 DIAGNOSIS — C78.7 METASTASES TO THE LIVER: ICD-10-CM

## 2025-05-06 DIAGNOSIS — C79.51 METASTASIS TO BONE: ICD-10-CM

## 2025-05-06 DIAGNOSIS — C61 PROSTATE CANCER: ICD-10-CM

## 2025-05-06 PROCEDURE — A9596 HC GALLIUM GA-68 GOZETOTIDE, DX (ILLUCCIX), PER 1 MCI: HCPCS | Mod: TB | Performed by: INTERNAL MEDICINE

## 2025-05-06 PROCEDURE — 78815 PET IMAGE W/CT SKULL-THIGH: CPT | Mod: TC

## 2025-05-06 RX ADMIN — KIT FOR THE PREPARATION OF GALLIUM GA 68 GOZETOTIDE INJECTION 5.5 MILLICURIE: KIT INTRAVENOUS at 10:05

## 2025-05-08 ENCOUNTER — OFFICE VISIT (OUTPATIENT)
Dept: HEMATOLOGY/ONCOLOGY | Facility: CLINIC | Age: 70
End: 2025-05-08
Payer: MEDICARE

## 2025-05-08 VITALS
BODY MASS INDEX: 28.51 KG/M2 | SYSTOLIC BLOOD PRESSURE: 126 MMHG | RESPIRATION RATE: 14 BRPM | WEIGHT: 188.13 LBS | DIASTOLIC BLOOD PRESSURE: 71 MMHG | HEIGHT: 68 IN | HEART RATE: 72 BPM | TEMPERATURE: 98 F | OXYGEN SATURATION: 99 %

## 2025-05-08 DIAGNOSIS — D63.1 ANEMIA IN STAGE 3A CHRONIC KIDNEY DISEASE: ICD-10-CM

## 2025-05-08 DIAGNOSIS — D64.9 ANEMIA, UNSPECIFIED TYPE: ICD-10-CM

## 2025-05-08 DIAGNOSIS — N18.31 ANEMIA IN STAGE 3A CHRONIC KIDNEY DISEASE: ICD-10-CM

## 2025-05-08 DIAGNOSIS — C61 PROSTATE CANCER: Primary | ICD-10-CM

## 2025-05-08 DIAGNOSIS — C78.7 METASTASES TO THE LIVER: ICD-10-CM

## 2025-05-08 DIAGNOSIS — C79.51 METASTASIS TO BONE: ICD-10-CM

## 2025-05-08 PROCEDURE — G2211 COMPLEX E/M VISIT ADD ON: HCPCS | Mod: S$PBB,,, | Performed by: INTERNAL MEDICINE

## 2025-05-08 PROCEDURE — 99215 OFFICE O/P EST HI 40 MIN: CPT | Mod: S$PBB,,, | Performed by: INTERNAL MEDICINE

## 2025-05-08 PROCEDURE — 99214 OFFICE O/P EST MOD 30 MIN: CPT | Mod: PBBFAC | Performed by: INTERNAL MEDICINE

## 2025-05-08 PROCEDURE — 99999 PR PBB SHADOW E&M-EST. PATIENT-LVL IV: CPT | Mod: PBBFAC,,, | Performed by: INTERNAL MEDICINE

## 2025-06-02 ENCOUNTER — LAB VISIT (OUTPATIENT)
Dept: LAB | Facility: HOSPITAL | Age: 70
End: 2025-06-02
Attending: INTERNAL MEDICINE
Payer: MEDICARE

## 2025-06-02 DIAGNOSIS — C78.7 METASTASES TO THE LIVER: ICD-10-CM

## 2025-06-02 DIAGNOSIS — C79.51 METASTASIS TO BONE: ICD-10-CM

## 2025-06-02 DIAGNOSIS — D64.9 ANEMIA, UNSPECIFIED TYPE: ICD-10-CM

## 2025-06-02 DIAGNOSIS — C61 PROSTATE CANCER: ICD-10-CM

## 2025-06-02 LAB
ALBUMIN SERPL-MCNC: 3.7 G/DL (ref 3.4–4.8)
ALBUMIN/GLOB SERPL: 1.1 RATIO (ref 1.1–2)
ALP SERPL-CCNC: 38 UNIT/L (ref 40–150)
ALT SERPL-CCNC: 19 UNIT/L (ref 0–55)
ANION GAP SERPL CALC-SCNC: 8 MEQ/L
AST SERPL-CCNC: 26 UNIT/L (ref 11–45)
BASOPHILS # BLD AUTO: 0.02 X10(3)/MCL
BASOPHILS NFR BLD AUTO: 0.7 %
BILIRUB SERPL-MCNC: 0.6 MG/DL
BUN SERPL-MCNC: 28.4 MG/DL (ref 8.4–25.7)
CALCIUM SERPL-MCNC: 9.2 MG/DL (ref 8.8–10)
CHLORIDE SERPL-SCNC: 109 MMOL/L (ref 98–107)
CO2 SERPL-SCNC: 22 MMOL/L (ref 23–31)
CREAT SERPL-MCNC: 1.21 MG/DL (ref 0.72–1.25)
CREAT/UREA NIT SERPL: 23
EOSINOPHIL # BLD AUTO: 0.14 X10(3)/MCL (ref 0–0.9)
EOSINOPHIL NFR BLD AUTO: 5.2 %
ERYTHROCYTE [DISTWIDTH] IN BLOOD BY AUTOMATED COUNT: 14.5 % (ref 11.5–17)
FERRITIN SERPL-MCNC: 304.82 NG/ML (ref 21.81–274.66)
FOLATE SERPL-MCNC: 12.9 NG/ML (ref 7–31.4)
GFR SERPLBLD CREATININE-BSD FMLA CKD-EPI: >60 ML/MIN/1.73/M2
GLOBULIN SER-MCNC: 3.5 GM/DL (ref 2.4–3.5)
GLUCOSE SERPL-MCNC: 102 MG/DL (ref 82–115)
HCT VFR BLD AUTO: 32.2 % (ref 42–52)
HGB BLD-MCNC: 10.9 G/DL (ref 14–18)
IMM GRANULOCYTES # BLD AUTO: 0.01 X10(3)/MCL (ref 0–0.04)
IMM GRANULOCYTES NFR BLD AUTO: 0.4 %
IRON SATN MFR SERPL: 27 % (ref 20–50)
IRON SERPL-MCNC: 95 UG/DL (ref 65–175)
LYMPHOCYTES # BLD AUTO: 0.62 X10(3)/MCL (ref 0.6–4.6)
LYMPHOCYTES NFR BLD AUTO: 23 %
MCH RBC QN AUTO: 32.7 PG (ref 27–31)
MCHC RBC AUTO-ENTMCNC: 33.9 G/DL (ref 33–36)
MCV RBC AUTO: 96.7 FL (ref 80–94)
MONOCYTES # BLD AUTO: 0.25 X10(3)/MCL (ref 0.1–1.3)
MONOCYTES NFR BLD AUTO: 9.3 %
NEUTROPHILS # BLD AUTO: 1.66 X10(3)/MCL (ref 2.1–9.2)
NEUTROPHILS NFR BLD AUTO: 61.4 %
PLATELET # BLD AUTO: 168 X10(3)/MCL (ref 130–400)
PMV BLD AUTO: 9.2 FL (ref 7.4–10.4)
POTASSIUM SERPL-SCNC: 4.6 MMOL/L (ref 3.5–5.1)
PROT SERPL-MCNC: 7.2 GM/DL (ref 5.8–7.6)
PSA SERPL-MCNC: <0.1 NG/ML
RBC # BLD AUTO: 3.33 X10(6)/MCL (ref 4.7–6.1)
SODIUM SERPL-SCNC: 139 MMOL/L (ref 136–145)
TIBC SERPL-MCNC: 263 UG/DL (ref 60–240)
TIBC SERPL-MCNC: 358 UG/DL (ref 250–450)
TRANSFERRIN SERPL-MCNC: 327 MG/DL (ref 163–344)
VIT B12 SERPL-MCNC: 535 PG/ML (ref 213–816)
WBC # BLD AUTO: 2.7 X10(3)/MCL (ref 4.5–11.5)

## 2025-06-02 PROCEDURE — 85025 COMPLETE CBC W/AUTO DIFF WBC: CPT

## 2025-06-02 PROCEDURE — 36415 COLL VENOUS BLD VENIPUNCTURE: CPT

## 2025-06-02 PROCEDURE — 84153 ASSAY OF PSA TOTAL: CPT

## 2025-06-02 PROCEDURE — 83550 IRON BINDING TEST: CPT

## 2025-06-02 PROCEDURE — 80053 COMPREHEN METABOLIC PANEL: CPT

## 2025-06-02 PROCEDURE — 82728 ASSAY OF FERRITIN: CPT

## 2025-06-02 PROCEDURE — 82607 VITAMIN B-12: CPT

## 2025-06-02 PROCEDURE — 82746 ASSAY OF FOLIC ACID SERUM: CPT

## 2025-06-05 ENCOUNTER — OFFICE VISIT (OUTPATIENT)
Dept: HEMATOLOGY/ONCOLOGY | Facility: CLINIC | Age: 70
End: 2025-06-05
Payer: MEDICARE

## 2025-06-05 VITALS
SYSTOLIC BLOOD PRESSURE: 134 MMHG | OXYGEN SATURATION: 97 % | HEART RATE: 79 BPM | BODY MASS INDEX: 28.79 KG/M2 | TEMPERATURE: 98 F | WEIGHT: 190 LBS | HEIGHT: 68 IN | RESPIRATION RATE: 14 BRPM | DIASTOLIC BLOOD PRESSURE: 68 MMHG

## 2025-06-05 DIAGNOSIS — C61 PROSTATE CANCER: Primary | ICD-10-CM

## 2025-06-05 DIAGNOSIS — D63.1 ANEMIA IN STAGE 3A CHRONIC KIDNEY DISEASE: ICD-10-CM

## 2025-06-05 DIAGNOSIS — C79.51 METASTASIS TO BONE: ICD-10-CM

## 2025-06-05 DIAGNOSIS — C78.7 METASTASES TO THE LIVER: ICD-10-CM

## 2025-06-05 DIAGNOSIS — N18.31 ANEMIA IN STAGE 3A CHRONIC KIDNEY DISEASE: ICD-10-CM

## 2025-06-05 PROCEDURE — 99999 PR PBB SHADOW E&M-EST. PATIENT-LVL IV: CPT | Mod: PBBFAC,,, | Performed by: NURSE PRACTITIONER

## 2025-06-05 PROCEDURE — 99214 OFFICE O/P EST MOD 30 MIN: CPT | Mod: PBBFAC | Performed by: NURSE PRACTITIONER

## 2025-06-05 RX ORDER — DAROLUTAMIDE 300 MG/1
1 TABLET, FILM COATED ORAL
COMMUNITY
Start: 2025-06-05 | End: 2025-06-05

## 2025-06-19 ENCOUNTER — INFUSION (OUTPATIENT)
Dept: INFUSION THERAPY | Facility: HOSPITAL | Age: 70
End: 2025-06-19
Attending: INTERNAL MEDICINE
Payer: MEDICARE

## 2025-06-19 VITALS
TEMPERATURE: 98 F | SYSTOLIC BLOOD PRESSURE: 153 MMHG | DIASTOLIC BLOOD PRESSURE: 52 MMHG | HEART RATE: 77 BPM | OXYGEN SATURATION: 99 %

## 2025-06-19 DIAGNOSIS — C61 PROSTATE CANCER: Primary | ICD-10-CM

## 2025-06-19 PROCEDURE — 96523 IRRIG DRUG DELIVERY DEVICE: CPT

## 2025-06-19 PROCEDURE — 25000003 PHARM REV CODE 250: Performed by: INTERNAL MEDICINE

## 2025-06-19 PROCEDURE — A4216 STERILE WATER/SALINE, 10 ML: HCPCS | Performed by: INTERNAL MEDICINE

## 2025-06-19 PROCEDURE — 63600175 PHARM REV CODE 636 W HCPCS: Performed by: INTERNAL MEDICINE

## 2025-06-19 RX ORDER — SODIUM CHLORIDE 0.9 % (FLUSH) 0.9 %
10 SYRINGE (ML) INJECTION
Status: DISCONTINUED | OUTPATIENT
Start: 2025-06-19 | End: 2025-06-19 | Stop reason: HOSPADM

## 2025-06-19 RX ORDER — SODIUM CHLORIDE 0.9 % (FLUSH) 0.9 %
10 SYRINGE (ML) INJECTION
OUTPATIENT
Start: 2025-09-11

## 2025-06-19 RX ORDER — HEPARIN 100 UNIT/ML
500 SYRINGE INTRAVENOUS
OUTPATIENT
Start: 2025-09-11

## 2025-06-19 RX ORDER — HEPARIN 100 UNIT/ML
500 SYRINGE INTRAVENOUS
Status: DISCONTINUED | OUTPATIENT
Start: 2025-06-19 | End: 2025-06-19 | Stop reason: HOSPADM

## 2025-06-19 RX ADMIN — HEPARIN 500 UNITS: 100 SYRINGE at 08:06

## 2025-06-19 RX ADMIN — SODIUM CHLORIDE, PRESERVATIVE FREE 10 ML: 5 INJECTION INTRAVENOUS at 08:06

## 2025-06-24 NOTE — PROGRESS NOTES
Subjective:       Patient ID: Juan Mayo is a 70 y.o. male.    PCP: Dr. Dalton Ang  Urology: Dr. Luis Worthy    1. Recurrence Stage IV--2024  H/o Prostate Cancer Stage IIA ([id number])--Diagnosed   Biopsy/pathology:  Prostate biopsy 2018--10/12 cores positive for adenocarcinoma, [place] 7.  CT-guided liver biopsy done 24--metastatic carcinoma c/w prostate primary site. CARIS with +Genomic LOUANN high, AR positive 2+ 100%, TMPRSS2 pathogenic fusion, TP53 pathogenic variant Exon 7, AKT1 pathogenic variant, APC pathogenic variant, MYC amplified, BRAF negative, CLARITA, NTRK fusion negative, RET negative, TMB low, SREEKANTH negative, BRCA1/2 negative (Clinical trials listed on [facility]).  Guardant liquid biopsy done 24--[id number] mutation (drug Capivasertib FDA-approved for breast cancer), TP53 mutation, APC mutation, MYC amplification, EGFR amplification.   Imagin. NM Bone Scan 18 at [facility]--small focus of increased activity overlies posterior right 10th rib, more intense focus of asymmetric activity right of midline T1 segment laterally, most typical degenerative origin, no findings elsewhere.  2. Prostate MRI w/ and w/o contrast [facility] 18--biopsy-proven high volume disease largely obscured by extensive biopsy-related hemorrhage, highest volume of disease right apex, no gross extracapsular disease, no seminal vesicle invasion or neurovascular bundle involvement, no skeletal or ranjeet metastases.  3. PSMA PET/CT on 24--Low level uptake within prominent pelvic lymph nodes suspicious for metastatic disease. Activity above baseline involving the cervical, thoracic and lumbar spine as well as the right ischial tuberosity compatible with osseous metastatic disease. Innumerable photopenic, hypodense hepatic lesions suspicious for metastatic disease. Correlate with MR.  Pulmonary emphysema. Emphysema on CT is an independent risk factor for lung cancer. Low-dose cancer screening to be considered in the future, if  patient does not already enrolled in such a program.   4. MRI abdomen w&w/o contrast on 8/29/24--Numerous liver metastases noted.  Greater than 30 lesions seen, appear roughly larger and more numerous compared to prior scan in 7/2024, index lesion measured in the right posterolateral aspect of the right hepatic lobe measuring 3.8 cm, gallbladder and biliary ductal system normal, pancreas normal, possible periaortic lymph node on the left measuring 1.2 cm, numerous metastatic liver lesions.  Lesions appear roughly larger and more numerous compared to a prior noncontrast CT exam from 7/19/24, possible single lymph node metastasis left periaortic region.  5. PSMA PET done 1/14/25--Nonspecific small area of hypermetabolism in the central prostate, could be artifactual from urine in the urethra, 3 osseous lesions have decreased radiotracer activity since July.  New 23 mm hypermetabolic lesion in the L4 vertebral body. Hypodense liver lesions stable to slightly smaller on the CT images with no defined hypermetabolism.  6. MRI abdomen w/ and w/o contrast done 2/10/25--Interval treatment response with decreased size of bilobar hepatic lesions and decreased enhancement at osseous lesions.  7. MRI lumbar spine 2/11/25--Sclerotic lesion in the L4 vertebral body consistent with osseous metastasis as seen on comparison PET-CT.  No evidence of epidural or paraspinal extension.  No evidence of new metastatic disease in the lumbar spine. Moderate degenerate narrowing the spinal canal at L4-5, mild at L1-L4 and L5-S1. Multilevel neural foraminal stenoses as described.  8. PSMA PET done 5/6/25--Similar appearance of nonspecific right central predominant prostate activity, slightly increased activity at the posterior L5 sclerosis with remaining index osseous lesions of decreased PSMA-avid uptake. Less conspicuous appearance of miniscule hypodensities throughout the liver, again without significant radiotracer uptake. Remaining  findings are grossly unchanged in the interval.     2. Anemia--Since 2019    Genetic testing:  Ambry done and negative.     Work-up:  2/2020--retic 1.4.  2/2021--iron saturation 22%, ferritin 260, vitamin B12 678.  3/2021--LDH normal, Haptoglobin normal, retic 1.9 (low-normal), folic acid normal, iron saturation 25%, ferritin 200, Radha negative, TSH normal 0.81, peripheral smear with normocytic anemia w/o anisocytosis.  6/2021--SPEP no M-spike, MAY negative, RF normal.    PSA:  01/2021--0.09, testosterone 199 (normal 343-1275).  08/27/24--113.73  09/17/24--30.63  10/02/24--4.74  10/30/24--0.85  12/17/24--0.17  01/09/25--<0.10  02/10/25--<0.10  03/10/25--<0.10  04/07/25--<0.10  05/06/25--<0.10  06/02/25--<0.10  07/02/25--<0.10    Procedures:  Mediport placement by Dr. Victor Hugo Gutierrez on 10/7/24.     Treatment history:  1. Prostate radiation 9/25/18--11/27/18.  2. Lupron/ADT X 1 year--completed in 7/2019.   3. XGEVA x 1 dose given 11/21/24 (stopped since still castrate sensitive).  4. Taxotere every 3 weeks X 6 cycles completed 9/19/24--1/2/25.     Treatment plan:  Eligard 45mg subQ every 6 months started 8/27/24, changed to Orgovyx 2/2025.   Darolutamide 600mg PO BID started on 9/8/24.     Chief Complaint: Other Misc (Pt reports no concerns today./)    HPI   Patient presents for a treatment visit for his recurrent prostate cancer. He is doing well. No complaints aside from fatigue, which is stable. Continues on Darolutamide and Orgovyx without problems. Overall he feels good. He has only some occasional back pain, but otherwise is asymptomatic. Denies any n/v/c/d. Appetite good and weight stable. He remains very active and is outside the majority of the day. He was instructed to stay hydrated during the summer months.     Past Medical History:   Diagnosis Date    Anemia, unspecified     Bilateral carotid artery stenosis     Essential (primary) hypertension     Fatigue     Prostate cancer     Urinary retention     Vitamin  D deficiency       Review of patient's allergies indicates:  No Known Allergies   Current Outpatient Medications on File Prior to Visit   Medication Sig Dispense Refill    aspirin 81 mg Cap Take 325 mg by mouth Daily.      atorvastatin (LIPITOR) 20 MG tablet Take 1 tablet (20 mg total) by mouth every evening. 30 tablet 11    cholecalciferol, vitamin D3, (VITAMIN D3) 25 mcg (1,000 unit) capsule Take 25 mcg by mouth.      darolutamide 300 mg Tab Take 300 mg by mouth 2 (two) times daily.      fenofibrate 160 MG Tab TAKE 1 TABLET (160 MG TOTAL) BY MOUTH ONCE DAILY. 30 tablet 11    folic acid (FOLVITE) 1 MG tablet TAKE 1 TABLET (1 MG TOTAL) BY MOUTH ONCE DAILY. 90 tablet 0    multivitamin (MULTIPLE VITAMIN ORAL) Take 1 capsule by mouth once daily.      NIFEdipine (PROCARDIA-XL) 30 MG (OSM) 24 hr tablet Take 1 tablet (30 mg total) by mouth every morning. 30 tablet 11    ORGOVYX 120 mg Tab Take 120 mg by mouth Daily.      ramipriL (ALTACE) 10 MG capsule Take 10 mg by mouth once daily.      tamsulosin (FLOMAX) 0.4 mg Cap Take 0.4 mg by mouth once daily.       No current facility-administered medications on file prior to visit.      Review of Systems   Constitutional:  Negative for appetite change and unexpected weight change.   HENT:  Negative for mouth sores.    Eyes:  Negative for visual disturbance.   Respiratory:  Negative for cough and shortness of breath.    Cardiovascular:  Negative for chest pain.   Gastrointestinal:  Negative for abdominal pain and diarrhea.   Genitourinary:  Negative for frequency.   Musculoskeletal:  Negative for back pain.   Integumentary:  Negative for rash.   Neurological:  Negative for headaches.   Hematological:  Negative for adenopathy.   Psychiatric/Behavioral:  The patient is not nervous/anxious.      Wt Readings from Last 3 Encounters:   07/07/25 1017 86.9 kg (191 lb 8 oz)   06/05/25 0921 86.2 kg (190 lb)   05/08/25 0955 85.3 kg (188 lb 1.6 oz)     Vitals:    07/07/25 1017   BP: (!)  111/56   Pulse: 88   Resp: 14   Temp: 98.1 °F (36.7 °C)      Physical Exam  Constitutional:       General: He is awake.      Appearance: Normal appearance. He is normal weight.   HENT:      Head: Normocephalic.   Eyes:      General: Lids are normal. Vision grossly intact.      Extraocular Movements: Extraocular movements intact.      Conjunctiva/sclera: Conjunctivae normal.   Cardiovascular:      Rate and Rhythm: Normal rate and regular rhythm.      Pulses: Normal pulses.      Heart sounds: Normal heart sounds.   Pulmonary:      Effort: Pulmonary effort is normal.      Breath sounds: Normal breath sounds and air entry.   Chest:      Comments: Left CW mediport intact  Abdominal:      General: Abdomen is flat. Bowel sounds are normal.      Palpations: Abdomen is soft.   Musculoskeletal:      Cervical back: Normal range of motion and neck supple.   Lymphadenopathy:      Comments: No palpable adenopathy   Skin:     General: Skin is warm and moist.   Neurological:      General: No focal deficit present.      Mental Status: He is alert and oriented to person, place, and time.   Psychiatric:         Attention and Perception: Attention normal.         Mood and Affect: Mood normal.         Speech: Speech normal.         Behavior: Behavior is cooperative.         Thought Content: Thought content normal.         Cognition and Memory: Cognition normal.         Judgment: Judgment normal.        Lab Visit on 07/02/2025   Component Date Value    Free/Total PSA 07/02/2025      Prostate Specific Antigen 07/02/2025 <0.10     Prostate Specific Antige* 07/02/2025 <0.10     PSA % Free 07/02/2025      Sodium 07/02/2025 140     Potassium 07/02/2025 4.6     Chloride 07/02/2025 107     CO2 07/02/2025 24     Glucose 07/02/2025 116 (H)     Blood Urea Nitrogen 07/02/2025 36.0 (H)     Creatinine 07/02/2025 1.55 (H)     Calcium 07/02/2025 9.6     Protein Total 07/02/2025 7.1     Albumin 07/02/2025 3.8     Globulin 07/02/2025 3.3      Albumin/Globulin Ratio 07/02/2025 1.2     Bilirubin Total 07/02/2025 0.5     ALP 07/02/2025 43     ALT 07/02/2025 22     AST 07/02/2025 31     eGFR 07/02/2025 47     Anion Gap 07/02/2025 9.0     BUN/Creatinine Ratio 07/02/2025 23     Vitamin B12 07/02/2025 462     WBC 07/02/2025 3.76 (L)     RBC 07/02/2025 3.17 (L)     Hgb 07/02/2025 10.6 (L)     Hct 07/02/2025 30.6 (L)     MCV 07/02/2025 96.5 (H)     MCH 07/02/2025 33.4 (H)     MCHC 07/02/2025 34.6     RDW 07/02/2025 13.7     Platelet 07/02/2025 170     MPV 07/02/2025 9.8     Neut % 07/02/2025 65.9     Lymph % 07/02/2025 18.1     Mono % 07/02/2025 10.4     Eos % 07/02/2025 4.8     Basophil % 07/02/2025 0.5     Imm Grans % 07/02/2025 0.3     Neut # 07/02/2025 2.48     Lymph # 07/02/2025 0.68     Mono # 07/02/2025 0.39     Eos # 07/02/2025 0.18     Baso # 07/02/2025 0.02     Imm Gran # 07/02/2025 0.01       Assessment:       1. Prostate cancer    2. Metastasis to bone    3. Metastases to the liver    4. Anemia in stage 3a chronic kidney disease    5. Folic acid deficiency    6. Leukopenia due to antineoplastic chemotherapy    7. Antineoplastic chemotherapy induced anemia        Plan:       Patient was following with me for a mild anemia.  H/o Prostate cancer in 2018, diagnosed with recurrence 7/2024 liver and bone metastases seen on PSMA PET done for elevated PSA.    Patient underwent liver biopsy on 8/27/24, pathology c/w metastatic prostate cancer.  Started on Eligard 8/27/24 with urology Dr. Worthy and Darolutamide ordered, but patient has not received yet.  Per Dr. Tobias, genetic testing negative and liquid Guardant also done, will obtain results.  Dr. Tobias also ordering CARIS for liver biopsy and there were no results with therapy associations, though there were some clinical trials listed.     Recommend treatment with triplet therapy ADT with Docetaxel plus Darolutamide given multi-organ involvement/high volume metastases per [facility].   Plan for 6 cycles at 60mg/m2  given his age.    Darolutamide started on 9/8/24.     Patient was given 1 dose of XGEVA on 11/21/24, but stopped since he is still castrate-sensitive.   Cycle 1 Taxotere started on 9/19/24.   Completed Cycle 6 on 1/2/25. Overall he tolerated treatment very well.      PSMA PET 1/14/25 improved, but new lesion at L4.   MRI liver done 2/10/25 with improvement in liver metastases.  PSMA PET 5/6/25 with improved findings, slight increase in activity at L5, otherwise stable. Dr. Zamora reviewed images from PET for L5 lesion, and does not look concerning at this time.  Plan to monitor closely. Plan to possibly repeat PSMA PET in 4 months.    CBC with stable anemia, WBC low with ANC normal, platelets normal. Denies any recent or recurrent infections. CMP with worsening of his CKD, PSA remains undetectable. He is outside often and understands he needs to stay hydrated.   Recent Iron level normal and ferritin elevated. Normal folate and Vitamin B12 as well.   Suspect anemia may be from CKD and also his cancer treatments.  Continue MVI with iron.   Continue current treatment ADT Orgovyx and Darolutamide. Changed to Orgovyx by urology in 2/2025.   F/u in 1 month with repeat labs and visit.  Plan to repeat PSMA PET in September.     Previously discussed with patient and his wife incurable nature of is cancer and palliative goals of treatment.    All questions answered at this time.      Alden Obando, [facility]    - code applied: patient requires or will require a continuous, longitudinal, and active collaborative plan of care related to this patient's health condition, metastatic prostate cancer --the management of which requires the direction of a practitioner with specialized clinical knowledge, skill, and expertise, as well as careful review of imaging to assess disease and  treatment response.

## 2025-07-02 ENCOUNTER — LAB VISIT (OUTPATIENT)
Dept: LAB | Facility: HOSPITAL | Age: 70
End: 2025-07-02
Attending: NURSE PRACTITIONER
Payer: MEDICARE

## 2025-07-02 DIAGNOSIS — C61 PROSTATE CANCER: ICD-10-CM

## 2025-07-02 DIAGNOSIS — D63.1 ANEMIA IN STAGE 3A CHRONIC KIDNEY DISEASE: ICD-10-CM

## 2025-07-02 DIAGNOSIS — C78.7 METASTASES TO THE LIVER: ICD-10-CM

## 2025-07-02 DIAGNOSIS — N18.31 ANEMIA IN STAGE 3A CHRONIC KIDNEY DISEASE: ICD-10-CM

## 2025-07-02 DIAGNOSIS — C79.51 METASTASIS TO BONE: ICD-10-CM

## 2025-07-02 LAB
ALBUMIN SERPL-MCNC: 3.8 G/DL (ref 3.4–4.8)
ALBUMIN/GLOB SERPL: 1.2 RATIO (ref 1.1–2)
ALP SERPL-CCNC: 43 UNIT/L (ref 40–150)
ALT SERPL-CCNC: 22 UNIT/L (ref 0–55)
ANION GAP SERPL CALC-SCNC: 9 MEQ/L
AST SERPL-CCNC: 31 UNIT/L (ref 11–45)
BASOPHILS # BLD AUTO: 0.02 X10(3)/MCL
BASOPHILS NFR BLD AUTO: 0.5 %
BILIRUB SERPL-MCNC: 0.5 MG/DL
BUN SERPL-MCNC: 36 MG/DL (ref 8.4–25.7)
CALCIUM SERPL-MCNC: 9.6 MG/DL (ref 8.8–10)
CHLORIDE SERPL-SCNC: 107 MMOL/L (ref 98–107)
CO2 SERPL-SCNC: 24 MMOL/L (ref 23–31)
CREAT SERPL-MCNC: 1.55 MG/DL (ref 0.72–1.25)
CREAT/UREA NIT SERPL: 23
EOSINOPHIL # BLD AUTO: 0.18 X10(3)/MCL (ref 0–0.9)
EOSINOPHIL NFR BLD AUTO: 4.8 %
ERYTHROCYTE [DISTWIDTH] IN BLOOD BY AUTOMATED COUNT: 13.7 % (ref 11.5–17)
FREE/TOTAL PSA (OLG): NORMAL
GFR SERPLBLD CREATININE-BSD FMLA CKD-EPI: 47 ML/MIN/1.73/M2
GLOBULIN SER-MCNC: 3.3 GM/DL (ref 2.4–3.5)
GLUCOSE SERPL-MCNC: 116 MG/DL (ref 82–115)
HCT VFR BLD AUTO: 30.6 % (ref 42–52)
HGB BLD-MCNC: 10.6 G/DL (ref 14–18)
IMM GRANULOCYTES # BLD AUTO: 0.01 X10(3)/MCL (ref 0–0.04)
IMM GRANULOCYTES NFR BLD AUTO: 0.3 %
LYMPHOCYTES # BLD AUTO: 0.68 X10(3)/MCL (ref 0.6–4.6)
LYMPHOCYTES NFR BLD AUTO: 18.1 %
MCH RBC QN AUTO: 33.4 PG (ref 27–31)
MCHC RBC AUTO-ENTMCNC: 34.6 G/DL (ref 33–36)
MCV RBC AUTO: 96.5 FL (ref 80–94)
MONOCYTES # BLD AUTO: 0.39 X10(3)/MCL (ref 0.1–1.3)
MONOCYTES NFR BLD AUTO: 10.4 %
NEUTROPHILS # BLD AUTO: 2.48 X10(3)/MCL (ref 2.1–9.2)
NEUTROPHILS NFR BLD AUTO: 65.9 %
PLATELET # BLD AUTO: 170 X10(3)/MCL (ref 130–400)
PMV BLD AUTO: 9.8 FL (ref 7.4–10.4)
POTASSIUM SERPL-SCNC: 4.6 MMOL/L (ref 3.5–5.1)
PROT SERPL-MCNC: 7.1 GM/DL (ref 5.8–7.6)
PSA FREE MFR SERPL: NORMAL %
PSA FREE SERPL-MCNC: <0.1 NG/ML
PSA SERPL-MCNC: <0.1 NG/ML
RBC # BLD AUTO: 3.17 X10(6)/MCL (ref 4.7–6.1)
SODIUM SERPL-SCNC: 140 MMOL/L (ref 136–145)
VIT B12 SERPL-MCNC: 462 PG/ML (ref 213–816)
WBC # BLD AUTO: 3.76 X10(3)/MCL (ref 4.5–11.5)

## 2025-07-02 PROCEDURE — 84153 ASSAY OF PSA TOTAL: CPT

## 2025-07-02 PROCEDURE — 80053 COMPREHEN METABOLIC PANEL: CPT

## 2025-07-02 PROCEDURE — 82607 VITAMIN B-12: CPT

## 2025-07-02 PROCEDURE — 85025 COMPLETE CBC W/AUTO DIFF WBC: CPT

## 2025-07-02 PROCEDURE — 36415 COLL VENOUS BLD VENIPUNCTURE: CPT

## 2025-07-07 ENCOUNTER — OFFICE VISIT (OUTPATIENT)
Dept: HEMATOLOGY/ONCOLOGY | Facility: CLINIC | Age: 70
End: 2025-07-07
Payer: MEDICARE

## 2025-07-07 VITALS
OXYGEN SATURATION: 95 % | WEIGHT: 191.5 LBS | DIASTOLIC BLOOD PRESSURE: 56 MMHG | SYSTOLIC BLOOD PRESSURE: 111 MMHG | HEART RATE: 88 BPM | BODY MASS INDEX: 29.02 KG/M2 | HEIGHT: 68 IN | TEMPERATURE: 98 F | RESPIRATION RATE: 14 BRPM

## 2025-07-07 DIAGNOSIS — D64.81 ANTINEOPLASTIC CHEMOTHERAPY INDUCED ANEMIA: ICD-10-CM

## 2025-07-07 DIAGNOSIS — N18.31 ANEMIA IN STAGE 3A CHRONIC KIDNEY DISEASE: ICD-10-CM

## 2025-07-07 DIAGNOSIS — C78.7 METASTASES TO THE LIVER: ICD-10-CM

## 2025-07-07 DIAGNOSIS — C79.51 METASTASIS TO BONE: ICD-10-CM

## 2025-07-07 DIAGNOSIS — D70.1 LEUKOPENIA DUE TO ANTINEOPLASTIC CHEMOTHERAPY: ICD-10-CM

## 2025-07-07 DIAGNOSIS — D63.1 ANEMIA IN STAGE 3A CHRONIC KIDNEY DISEASE: ICD-10-CM

## 2025-07-07 DIAGNOSIS — C61 PROSTATE CANCER: Primary | ICD-10-CM

## 2025-07-07 DIAGNOSIS — T45.1X5A LEUKOPENIA DUE TO ANTINEOPLASTIC CHEMOTHERAPY: ICD-10-CM

## 2025-07-07 DIAGNOSIS — E53.8 FOLIC ACID DEFICIENCY: ICD-10-CM

## 2025-07-07 DIAGNOSIS — T45.1X5A ANTINEOPLASTIC CHEMOTHERAPY INDUCED ANEMIA: ICD-10-CM

## 2025-07-07 PROCEDURE — 99999 PR PBB SHADOW E&M-EST. PATIENT-LVL IV: CPT | Mod: PBBFAC,,, | Performed by: NURSE PRACTITIONER

## 2025-07-07 PROCEDURE — 99214 OFFICE O/P EST MOD 30 MIN: CPT | Mod: PBBFAC | Performed by: NURSE PRACTITIONER

## 2025-07-07 PROCEDURE — G2211 COMPLEX E/M VISIT ADD ON: HCPCS | Mod: ,,, | Performed by: NURSE PRACTITIONER

## 2025-07-07 PROCEDURE — 99214 OFFICE O/P EST MOD 30 MIN: CPT | Mod: S$PBB,,, | Performed by: NURSE PRACTITIONER

## 2025-08-01 NOTE — PROGRESS NOTES
Subjective:       Patient ID: Juan Mayo is a 70 y.o. male.    PCP: Dr. Dalton Ang  Urology: Dr. Luis Worthy    1. Recurrence Stage IV--2024  H/o Prostate Cancer Stage IIA ([id number])--Diagnosed   Biopsy/pathology:  Prostate biopsy 2018--10/12 cores positive for adenocarcinoma, [place] 7.  CT-guided liver biopsy done 24--metastatic carcinoma c/w prostate primary site. CARIS with +Genomic LOUANN high, AR positive 2+ 100%, TMPRSS2 pathogenic fusion, TP53 pathogenic variant Exon 7, AKT1 pathogenic variant, APC pathogenic variant, MYC amplified, BRAF negative, CLARITA, NTRK fusion negative, RET negative, TMB low, SREEKANTH negative, BRCA1/2 negative (Clinical trials listed on [facility]).  Guardant liquid biopsy done 24--[id number] mutation (drug Capivasertib FDA-approved for breast cancer), TP53 mutation, APC mutation, MYC amplification, EGFR amplification.   Imagin. NM Bone Scan 18 at [facility]--small focus of increased activity overlies posterior right 10th rib, more intense focus of asymmetric activity right of midline T1 segment laterally, most typical degenerative origin, no findings elsewhere.  2. Prostate MRI w/ and w/o contrast [facility] 18--biopsy-proven high volume disease largely obscured by extensive biopsy-related hemorrhage, highest volume of disease right apex, no gross extracapsular disease, no seminal vesicle invasion or neurovascular bundle involvement, no skeletal or ranjeet metastases.  3. PSMA PET/CT on 24--Low level uptake within prominent pelvic lymph nodes suspicious for metastatic disease. Activity above baseline involving the cervical, thoracic and lumbar spine as well as the right ischial tuberosity compatible with osseous metastatic disease. Innumerable photopenic, hypodense hepatic lesions suspicious for metastatic disease. Correlate with MR.  Pulmonary emphysema. Emphysema on CT is an independent risk factor for lung cancer. Low-dose cancer screening to be considered in the future, if  patient does not already enrolled in such a program.   4. MRI abdomen w&w/o contrast on 8/29/24--Numerous liver metastases noted.  Greater than 30 lesions seen, appear roughly larger and more numerous compared to prior scan in 7/2024, index lesion measured in the right posterolateral aspect of the right hepatic lobe measuring 3.8 cm, gallbladder and biliary ductal system normal, pancreas normal, possible periaortic lymph node on the left measuring 1.2 cm, numerous metastatic liver lesions.  Lesions appear roughly larger and more numerous compared to a prior noncontrast CT exam from 7/19/24, possible single lymph node metastasis left periaortic region.  5. PSMA PET done 1/14/25--Nonspecific small area of hypermetabolism in the central prostate, could be artifactual from urine in the urethra, 3 osseous lesions have decreased radiotracer activity since July.  New 23 mm hypermetabolic lesion in the L4 vertebral body. Hypodense liver lesions stable to slightly smaller on the CT images with no defined hypermetabolism.  6. MRI abdomen w/ and w/o contrast done 2/10/25--Interval treatment response with decreased size of bilobar hepatic lesions and decreased enhancement at osseous lesions.  7. MRI lumbar spine 2/11/25--Sclerotic lesion in the L4 vertebral body consistent with osseous metastasis as seen on comparison PET-CT.  No evidence of epidural or paraspinal extension.  No evidence of new metastatic disease in the lumbar spine. Moderate degenerate narrowing the spinal canal at L4-5, mild at L1-L4 and L5-S1. Multilevel neural foraminal stenoses as described.  8. PSMA PET done 5/6/25--Similar appearance of nonspecific right central predominant prostate activity, slightly increased activity at the posterior L5 sclerosis with remaining index osseous lesions of decreased PSMA-avid uptake. Less conspicuous appearance of miniscule hypodensities throughout the liver, again without significant radiotracer uptake. Remaining  findings are grossly unchanged in the interval.     2. Anemia--Since 2019    Genetic testing:  Ambry done and negative.     Work-up:  2/2020--retic 1.4.  2/2021--iron saturation 22%, ferritin 260, vitamin B12 678.  3/2021--LDH normal, Haptoglobin normal, retic 1.9 (low-normal), folic acid normal, iron saturation 25%, ferritin 200, Radha negative, TSH normal 0.81, peripheral smear with normocytic anemia w/o anisocytosis.  6/2021--SPEP no M-spike, MAY negative, RF normal.    PSA:  01/2021--0.09, testosterone 199 (normal 343-1275).  08/27/24--113.73  09/17/24--30.63  10/02/24--4.74  10/30/24--0.85  12/17/24--0.17  01/09/25--<0.10  02/10/25--<0.10  03/10/25--<0.10  04/07/25--<0.10  05/06/25--<0.10  06/02/25--<0.10  07/02/25--<0.10  08/05/25--<0.10    Procedures:  Mediport placement by Dr. Victor Hugo Gutierrez on 10/7/24.     Treatment history:  1. Prostate radiation 9/25/18--11/27/18.  2. Lupron/ADT X 1 year--completed in 7/2019.   3. XGEVA x 1 dose given 11/21/24 (stopped since still castrate sensitive).  4. Taxotere every 3 weeks X 6 cycles completed 9/19/24--1/2/25.     Treatment plan:  Eligard 45mg subQ every 6 months started 8/27/24, changed to Orgovyx 2/2025.   Darolutamide 600mg PO BID started on 9/8/24.     Chief Complaint: Other Misc (Pt reports no concerns today./)    HPI   Patient presents for a treatment visit for his recurrent prostate cancer. He is doing well. No complaints aside from fatigue, which is stable. Continues on Darolutamide and Orgovyx without problems. Overall he feels good. He has only some occasional back pain, but otherwise is asymptomatic. Denies any n/v/c/d. Appetite good and weight stable. He remains very active and is outside the majority of the day. He was instructed to stay hydrated during the summer months.     Past Medical History:   Diagnosis Date    Anemia, unspecified     Bilateral carotid artery stenosis     Essential (primary) hypertension     Fatigue     Prostate cancer     Urinary  retention     Vitamin D deficiency       Review of patient's allergies indicates:  No Known Allergies   Current Outpatient Medications on File Prior to Visit   Medication Sig Dispense Refill    aspirin 81 mg Cap Take 325 mg by mouth Daily.      atorvastatin (LIPITOR) 20 MG tablet Take 1 tablet (20 mg total) by mouth every evening. 30 tablet 11    cholecalciferol, vitamin D3, (VITAMIN D3) 25 mcg (1,000 unit) capsule Take 25 mcg by mouth.      darolutamide 300 mg Tab Take 300 mg by mouth 2 (two) times daily.      fenofibrate 160 MG Tab TAKE 1 TABLET (160 MG TOTAL) BY MOUTH ONCE DAILY. 30 tablet 11    folic acid (FOLVITE) 1 MG tablet TAKE 1 TABLET (1 MG TOTAL) BY MOUTH ONCE DAILY. 90 tablet 0    multivitamin (MULTIPLE VITAMIN ORAL) Take 1 capsule by mouth once daily.      NIFEdipine (PROCARDIA-XL) 30 MG (OSM) 24 hr tablet Take 1 tablet (30 mg total) by mouth every morning. 30 tablet 11    ORGOVYX 120 mg Tab Take 120 mg by mouth Daily.      ramipriL (ALTACE) 10 MG capsule Take 10 mg by mouth once daily.      tamsulosin (FLOMAX) 0.4 mg Cap Take 0.4 mg by mouth once daily.       No current facility-administered medications on file prior to visit.      Review of Systems   Constitutional:  Negative for appetite change and unexpected weight change.   HENT:  Negative for mouth sores.    Eyes:  Negative for visual disturbance.   Respiratory:  Negative for cough and shortness of breath.    Cardiovascular:  Negative for chest pain.   Gastrointestinal:  Negative for abdominal pain and diarrhea.   Genitourinary:  Negative for frequency.   Musculoskeletal:  Negative for back pain.   Integumentary:  Negative for rash.   Neurological:  Negative for headaches.   Hematological:  Negative for adenopathy.   Psychiatric/Behavioral:  The patient is not nervous/anxious.      Wt Readings from Last 3 Encounters:   08/07/25 0932 87.4 kg (192 lb 11.2 oz)   07/07/25 1017 86.9 kg (191 lb 8 oz)   06/05/25 0921 86.2 kg (190 lb)     Vitals:     08/07/25 0932   BP: 108/62   Pulse: 89   Resp: 14   Temp: 98.1 °F (36.7 °C)     Physical Exam  Constitutional:       General: He is awake.      Appearance: Normal appearance. He is normal weight.   HENT:      Head: Normocephalic.   Eyes:      General: Lids are normal. Vision grossly intact.      Extraocular Movements: Extraocular movements intact.      Conjunctiva/sclera: Conjunctivae normal.   Cardiovascular:      Rate and Rhythm: Normal rate and regular rhythm.      Pulses: Normal pulses.      Heart sounds: Normal heart sounds.   Pulmonary:      Effort: Pulmonary effort is normal.      Breath sounds: Normal breath sounds and air entry.   Chest:      Comments: Left CW mediport intact  Abdominal:      General: Abdomen is flat. Bowel sounds are normal.      Palpations: Abdomen is soft.   Musculoskeletal:      Cervical back: Normal range of motion and neck supple.   Lymphadenopathy:      Comments: No palpable adenopathy   Skin:     General: Skin is warm and moist.   Neurological:      General: No focal deficit present.      Mental Status: He is alert and oriented to person, place, and time.   Psychiatric:         Attention and Perception: Attention normal.         Mood and Affect: Mood normal.         Speech: Speech normal.         Behavior: Behavior is cooperative.         Thought Content: Thought content normal.         Cognition and Memory: Cognition normal.         Judgment: Judgment normal.        Lab Visit on 08/05/2025   Component Date Value    Sodium 08/05/2025 142     Potassium 08/05/2025 5.0     Chloride 08/05/2025 109 (H)     CO2 08/05/2025 27     Glucose 08/05/2025 120 (H)     Blood Urea Nitrogen 08/05/2025 28.6 (H)     Creatinine 08/05/2025 1.52 (H)     Calcium 08/05/2025 9.8     Protein Total 08/05/2025 7.3     Albumin 08/05/2025 3.8     Globulin 08/05/2025 3.5     Albumin/Globulin Ratio 08/05/2025 1.1     Bilirubin Total 08/05/2025 0.6     ALP 08/05/2025 48     ALT 08/05/2025 25     AST 08/05/2025 32      eGFR 08/05/2025 48     Anion Gap 08/05/2025 6.0     BUN/Creatinine Ratio 08/05/2025 19     Free/Total PSA 08/05/2025      Prostate Specific Antigen 08/05/2025 <0.10     Prostate Specific Antige* 08/05/2025 <0.10     PSA % Free 08/05/2025      WBC 08/05/2025 2.94 (L)     RBC 08/05/2025 3.17 (L)     Hgb 08/05/2025 10.6 (L)     Hct 08/05/2025 30.9 (L)     MCV 08/05/2025 97.5 (H)     MCH 08/05/2025 33.4 (H)     MCHC 08/05/2025 34.3     RDW 08/05/2025 13.5     Platelet 08/05/2025 152     MPV 08/05/2025 9.1     Neut % 08/05/2025 63.4     Lymph % 08/05/2025 20.7     Mono % 08/05/2025 10.2     Eos % 08/05/2025 5.1     Basophil % 08/05/2025 0.3     Imm Grans % 08/05/2025 0.3     Neut # 08/05/2025 1.86 (L)     Lymph # 08/05/2025 0.61     Mono # 08/05/2025 0.30     Eos # 08/05/2025 0.15     Baso # 08/05/2025 0.01     Imm Gran # 08/05/2025 0.01       Assessment:       1. Prostate cancer    2. Metastases to the liver    3. Metastasis to bone    4. Antineoplastic chemotherapy induced anemia    5. 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  H/o Prostate cancer in 2018, diagnosed with recurrence 7/2024 liver and bone metastases seen on PSMA PET done for elevated PSA.    Patient underwent liver biopsy on 8/27/24, pathology c/w metastatic prostate cancer.  Started on Eligard 8/27/24 with urology Dr. Worthy and Darolutamide ordered, but patient has not received yet.  Per Dr. Tobias, genetic testing negative and liquid Guardant also done, will obtain results.  Dr. Tobias also ordering CARIS for liver biopsy and there were no results with therapy associations, though there were some clinical trials listed.     Recommend treatment with triplet therapy ADT with Docetaxel plus Darolutamide given multi-organ involvement/high volume metastases per [facility].   Plan for 6 cycles at 60mg/m2 given his age.    Darolutamide started on 9/8/24.      Patient was given 1 dose of XGEVA on 11/21/24, but stopped since he is still castrate-sensitive.   Cycle 1 Taxotere started on 9/19/24.   Completed Cycle 6 on 1/2/25. Overall he tolerated treatment very well.      PSMA PET 1/14/25 improved, but new lesion at L4.   MRI liver done 2/10/25 with improvement in liver metastases.  PSMA PET 5/6/25 with improved findings, slight increase in activity at L5, otherwise stable. Dr. Zamora reviewed images from PET for L5 lesion, and does not look concerning at this time.  Plan to monitor closely. Plan to repeat PSMA PET in 4 months.    CBC with stable anemia, WBC low with ANC 1860, platelets normal. Denies any recent or recurrent infections. CMP with stable CKD.  PSA remains undetectable.   He is outside often and understands he needs to stay hydrated. I discussed this again today.   Recent Iron level normal and ferritin elevated. Normal folate and Vitamin B12 as well.   Suspect anemia may be from CKD and also his cancer treatments.  Continue MVI with iron.   Continue current treatment ADT Orgovyx and Darolutamide. Changed to Orgovyx by urology in 2/2025.   Plan to repeat PSMA PET in September. Will send orders today.   Follow-up in 1 month after labs and scan completed.     Previously discussed with patient and his wife incurable nature of is cancer and palliative goals of treatment.    All questions answered at this time.      Alden Obando, [facility]    - code applied: patient requires or will require a continuous, longitudinal, and active collaborative plan of care related to this patient's health condition, metastatic prostate cancer --the management of which requires the direction of a practitioner with specialized clinical knowledge, skill, and expertise, as well as careful review of imaging to assess disease and  treatment response.

## 2025-08-05 ENCOUNTER — LAB VISIT (OUTPATIENT)
Dept: LAB | Facility: HOSPITAL | Age: 70
End: 2025-08-05
Attending: NURSE PRACTITIONER
Payer: MEDICARE

## 2025-08-05 DIAGNOSIS — N18.31 ANEMIA IN STAGE 3A CHRONIC KIDNEY DISEASE: ICD-10-CM

## 2025-08-05 DIAGNOSIS — C78.7 METASTASES TO THE LIVER: ICD-10-CM

## 2025-08-05 DIAGNOSIS — E53.8 FOLIC ACID DEFICIENCY: ICD-10-CM

## 2025-08-05 DIAGNOSIS — C79.51 METASTASIS TO BONE: ICD-10-CM

## 2025-08-05 DIAGNOSIS — D63.1 ANEMIA IN STAGE 3A CHRONIC KIDNEY DISEASE: ICD-10-CM

## 2025-08-05 DIAGNOSIS — C61 PROSTATE CANCER: ICD-10-CM

## 2025-08-05 LAB
ALBUMIN SERPL-MCNC: 3.8 G/DL (ref 3.4–4.8)
ALBUMIN/GLOB SERPL: 1.1 RATIO (ref 1.1–2)
ALP SERPL-CCNC: 48 UNIT/L (ref 40–150)
ALT SERPL-CCNC: 25 UNIT/L (ref 0–55)
ANION GAP SERPL CALC-SCNC: 6 MEQ/L
AST SERPL-CCNC: 32 UNIT/L (ref 11–45)
BASOPHILS # BLD AUTO: 0.01 X10(3)/MCL
BASOPHILS NFR BLD AUTO: 0.3 %
BILIRUB SERPL-MCNC: 0.6 MG/DL
BUN SERPL-MCNC: 28.6 MG/DL (ref 8.4–25.7)
CALCIUM SERPL-MCNC: 9.8 MG/DL (ref 8.8–10)
CHLORIDE SERPL-SCNC: 109 MMOL/L (ref 98–107)
CO2 SERPL-SCNC: 27 MMOL/L (ref 23–31)
CREAT SERPL-MCNC: 1.52 MG/DL (ref 0.72–1.25)
CREAT/UREA NIT SERPL: 19
EOSINOPHIL # BLD AUTO: 0.15 X10(3)/MCL (ref 0–0.9)
EOSINOPHIL NFR BLD AUTO: 5.1 %
ERYTHROCYTE [DISTWIDTH] IN BLOOD BY AUTOMATED COUNT: 13.5 % (ref 11.5–17)
FREE/TOTAL PSA (OLG): NORMAL
GFR SERPLBLD CREATININE-BSD FMLA CKD-EPI: 48 ML/MIN/1.73/M2
GLOBULIN SER-MCNC: 3.5 GM/DL (ref 2.4–3.5)
GLUCOSE SERPL-MCNC: 120 MG/DL (ref 82–115)
HCT VFR BLD AUTO: 30.9 % (ref 42–52)
HGB BLD-MCNC: 10.6 G/DL (ref 14–18)
IMM GRANULOCYTES # BLD AUTO: 0.01 X10(3)/MCL (ref 0–0.04)
IMM GRANULOCYTES NFR BLD AUTO: 0.3 %
LYMPHOCYTES # BLD AUTO: 0.61 X10(3)/MCL (ref 0.6–4.6)
LYMPHOCYTES NFR BLD AUTO: 20.7 %
MCH RBC QN AUTO: 33.4 PG (ref 27–31)
MCHC RBC AUTO-ENTMCNC: 34.3 G/DL (ref 33–36)
MCV RBC AUTO: 97.5 FL (ref 80–94)
MONOCYTES # BLD AUTO: 0.3 X10(3)/MCL (ref 0.1–1.3)
MONOCYTES NFR BLD AUTO: 10.2 %
NEUTROPHILS # BLD AUTO: 1.86 X10(3)/MCL (ref 2.1–9.2)
NEUTROPHILS NFR BLD AUTO: 63.4 %
PLATELET # BLD AUTO: 152 X10(3)/MCL (ref 130–400)
PMV BLD AUTO: 9.1 FL (ref 7.4–10.4)
POTASSIUM SERPL-SCNC: 5 MMOL/L (ref 3.5–5.1)
PROT SERPL-MCNC: 7.3 GM/DL (ref 5.8–7.6)
PSA FREE MFR SERPL: NORMAL %
PSA FREE SERPL-MCNC: <0.1 NG/ML
PSA SERPL-MCNC: <0.1 NG/ML
RBC # BLD AUTO: 3.17 X10(6)/MCL (ref 4.7–6.1)
SODIUM SERPL-SCNC: 142 MMOL/L (ref 136–145)
WBC # BLD AUTO: 2.94 X10(3)/MCL (ref 4.5–11.5)

## 2025-08-05 PROCEDURE — 80053 COMPREHEN METABOLIC PANEL: CPT

## 2025-08-05 PROCEDURE — 85025 COMPLETE CBC W/AUTO DIFF WBC: CPT

## 2025-08-05 PROCEDURE — 36415 COLL VENOUS BLD VENIPUNCTURE: CPT

## 2025-08-05 PROCEDURE — 84153 ASSAY OF PSA TOTAL: CPT

## 2025-08-07 ENCOUNTER — OFFICE VISIT (OUTPATIENT)
Dept: HEMATOLOGY/ONCOLOGY | Facility: CLINIC | Age: 70
End: 2025-08-07
Payer: MEDICARE

## 2025-08-07 VITALS
DIASTOLIC BLOOD PRESSURE: 62 MMHG | BODY MASS INDEX: 29.2 KG/M2 | WEIGHT: 192.69 LBS | HEIGHT: 68 IN | SYSTOLIC BLOOD PRESSURE: 108 MMHG | RESPIRATION RATE: 14 BRPM | HEART RATE: 89 BPM | TEMPERATURE: 98 F | OXYGEN SATURATION: 97 %

## 2025-08-07 DIAGNOSIS — C78.7 METASTASES TO THE LIVER: ICD-10-CM

## 2025-08-07 DIAGNOSIS — D64.81 ANTINEOPLASTIC CHEMOTHERAPY INDUCED ANEMIA: ICD-10-CM

## 2025-08-07 DIAGNOSIS — Z79.899 IMMUNODEFICIENCY DUE TO DRUGS: ICD-10-CM

## 2025-08-07 DIAGNOSIS — D84.821 IMMUNODEFICIENCY DUE TO DRUGS: ICD-10-CM

## 2025-08-07 DIAGNOSIS — N18.9 ANEMIA DUE TO CHRONIC KIDNEY DISEASE, UNSPECIFIED CKD STAGE: ICD-10-CM

## 2025-08-07 DIAGNOSIS — E53.8 FOLIC ACID DEFICIENCY: ICD-10-CM

## 2025-08-07 DIAGNOSIS — C61 PROSTATE CANCER: Primary | ICD-10-CM

## 2025-08-07 DIAGNOSIS — T45.1X5A ANTINEOPLASTIC CHEMOTHERAPY INDUCED ANEMIA: ICD-10-CM

## 2025-08-07 DIAGNOSIS — N18.32 ANEMIA IN STAGE 3B CHRONIC KIDNEY DISEASE: ICD-10-CM

## 2025-08-07 DIAGNOSIS — D63.1 ANEMIA DUE TO CHRONIC KIDNEY DISEASE, UNSPECIFIED CKD STAGE: ICD-10-CM

## 2025-08-07 DIAGNOSIS — C79.51 METASTASIS TO BONE: ICD-10-CM

## 2025-08-07 DIAGNOSIS — D63.1 ANEMIA IN STAGE 3B CHRONIC KIDNEY DISEASE: ICD-10-CM

## 2025-08-07 PROCEDURE — 99214 OFFICE O/P EST MOD 30 MIN: CPT | Mod: PBBFAC | Performed by: NURSE PRACTITIONER

## 2025-08-07 PROCEDURE — 99999 PR PBB SHADOW E&M-EST. PATIENT-LVL IV: CPT | Mod: PBBFAC,,, | Performed by: NURSE PRACTITIONER

## 2025-08-07 RX ORDER — FOLIC ACID 1 MG/1
1 TABLET ORAL DAILY
Qty: 90 TABLET | Refills: 3 | Status: SHIPPED | OUTPATIENT
Start: 2025-08-07

## 2025-08-28 DIAGNOSIS — N18.31 STAGE 3A CHRONIC KIDNEY DISEASE: Primary | ICD-10-CM

## 2025-09-05 ENCOUNTER — HOSPITAL ENCOUNTER (OUTPATIENT)
Dept: RADIOLOGY | Facility: HOSPITAL | Age: 70
Discharge: HOME | End: 2025-09-05
Attending: NURSE PRACTITIONER
Payer: MEDICARE

## 2025-09-05 DIAGNOSIS — C78.7 METASTASES TO THE LIVER: ICD-10-CM

## 2025-09-05 DIAGNOSIS — T45.1X5A ANTINEOPLASTIC CHEMOTHERAPY INDUCED ANEMIA: ICD-10-CM

## 2025-09-05 DIAGNOSIS — D63.1 ANEMIA DUE TO CHRONIC KIDNEY DISEASE, UNSPECIFIED CKD STAGE: ICD-10-CM

## 2025-09-05 DIAGNOSIS — N18.32 ANEMIA IN STAGE 3B CHRONIC KIDNEY DISEASE: ICD-10-CM

## 2025-09-05 DIAGNOSIS — D63.1 ANEMIA IN STAGE 3B CHRONIC KIDNEY DISEASE: ICD-10-CM

## 2025-09-05 DIAGNOSIS — D84.821 IMMUNODEFICIENCY DUE TO DRUGS: ICD-10-CM

## 2025-09-05 DIAGNOSIS — E53.8 FOLIC ACID DEFICIENCY: ICD-10-CM

## 2025-09-05 DIAGNOSIS — N18.9 ANEMIA DUE TO CHRONIC KIDNEY DISEASE, UNSPECIFIED CKD STAGE: ICD-10-CM

## 2025-09-05 DIAGNOSIS — D64.81 ANTINEOPLASTIC CHEMOTHERAPY INDUCED ANEMIA: ICD-10-CM

## 2025-09-05 DIAGNOSIS — Z79.899 IMMUNODEFICIENCY DUE TO DRUGS: ICD-10-CM

## 2025-09-05 DIAGNOSIS — C79.51 METASTASIS TO BONE: ICD-10-CM

## 2025-09-05 DIAGNOSIS — C61 PROSTATE CANCER: ICD-10-CM

## 2025-09-05 PROCEDURE — 78815 PET IMAGE W/CT SKULL-THIGH: CPT | Mod: TC
